# Patient Record
Sex: MALE | Race: WHITE | URBAN - METROPOLITAN AREA
[De-identification: names, ages, dates, MRNs, and addresses within clinical notes are randomized per-mention and may not be internally consistent; named-entity substitution may affect disease eponyms.]

---

## 2020-12-16 ENCOUNTER — OUTPATIENT (OUTPATIENT)
Dept: OUTPATIENT SERVICES | Facility: HOSPITAL | Age: 45
LOS: 1 days | End: 2020-12-16
Payer: COMMERCIAL

## 2020-12-16 DIAGNOSIS — Z20.828 CONTACT WITH AND (SUSPECTED) EXPOSURE TO OTHER VIRAL COMMUNICABLE DISEASES: ICD-10-CM

## 2020-12-16 LAB — SARS-COV-2 RNA SPEC QL NAA+PROBE: SIGNIFICANT CHANGE UP

## 2020-12-16 PROCEDURE — U0003: CPT

## 2020-12-17 DIAGNOSIS — Z20.828 CONTACT WITH AND (SUSPECTED) EXPOSURE TO OTHER VIRAL COMMUNICABLE DISEASES: ICD-10-CM

## 2021-01-02 ENCOUNTER — OUTPATIENT (OUTPATIENT)
Dept: OUTPATIENT SERVICES | Facility: HOSPITAL | Age: 46
LOS: 1 days | End: 2021-01-02
Payer: COMMERCIAL

## 2021-01-02 DIAGNOSIS — Z20.828 CONTACT WITH AND (SUSPECTED) EXPOSURE TO OTHER VIRAL COMMUNICABLE DISEASES: ICD-10-CM

## 2021-01-02 PROCEDURE — C9803: CPT

## 2021-01-02 PROCEDURE — U0003: CPT

## 2021-01-02 PROCEDURE — U0005: CPT

## 2021-01-03 DIAGNOSIS — Z20.828 CONTACT WITH AND (SUSPECTED) EXPOSURE TO OTHER VIRAL COMMUNICABLE DISEASES: ICD-10-CM

## 2021-01-03 LAB — SARS-COV-2 RNA SPEC QL NAA+PROBE: SIGNIFICANT CHANGE UP

## 2021-01-11 ENCOUNTER — OUTPATIENT (OUTPATIENT)
Dept: OUTPATIENT SERVICES | Facility: HOSPITAL | Age: 46
LOS: 1 days | End: 2021-01-11
Payer: COMMERCIAL

## 2021-01-11 DIAGNOSIS — Z20.828 CONTACT WITH AND (SUSPECTED) EXPOSURE TO OTHER VIRAL COMMUNICABLE DISEASES: ICD-10-CM

## 2021-01-11 LAB — SARS-COV-2 RNA SPEC QL NAA+PROBE: SIGNIFICANT CHANGE UP

## 2021-01-11 PROCEDURE — U0005: CPT

## 2021-01-11 PROCEDURE — U0003: CPT

## 2021-01-11 PROCEDURE — C9803: CPT

## 2021-01-12 DIAGNOSIS — Z20.828 CONTACT WITH AND (SUSPECTED) EXPOSURE TO OTHER VIRAL COMMUNICABLE DISEASES: ICD-10-CM

## 2021-05-29 ENCOUNTER — TRANSCRIPTION ENCOUNTER (OUTPATIENT)
Age: 46
End: 2021-05-29

## 2023-02-02 ENCOUNTER — OUTPATIENT (OUTPATIENT)
Dept: OUTPATIENT SERVICES | Facility: HOSPITAL | Age: 48
LOS: 1 days | Discharge: HOME | End: 2023-02-02
Payer: COMMERCIAL

## 2023-02-02 ENCOUNTER — TRANSCRIPTION ENCOUNTER (OUTPATIENT)
Age: 48
End: 2023-02-02

## 2023-02-02 DIAGNOSIS — C18.9 MALIGNANT NEOPLASM OF COLON, UNSPECIFIED: ICD-10-CM

## 2023-02-03 PROBLEM — Z00.00 ENCOUNTER FOR PREVENTIVE HEALTH EXAMINATION: Status: ACTIVE | Noted: 2023-02-03

## 2023-02-09 ENCOUNTER — NON-APPOINTMENT (OUTPATIENT)
Age: 48
End: 2023-02-09

## 2023-02-09 ENCOUNTER — APPOINTMENT (OUTPATIENT)
Dept: HEMATOLOGY ONCOLOGY | Facility: CLINIC | Age: 48
End: 2023-02-09
Payer: COMMERCIAL

## 2023-02-09 VITALS
SYSTOLIC BLOOD PRESSURE: 120 MMHG | DIASTOLIC BLOOD PRESSURE: 79 MMHG | OXYGEN SATURATION: 97 % | WEIGHT: 279.98 LBS | TEMPERATURE: 97.5 F | HEIGHT: 73 IN | HEART RATE: 65 BPM | BODY MASS INDEX: 37.11 KG/M2

## 2023-02-09 DIAGNOSIS — Z78.9 OTHER SPECIFIED HEALTH STATUS: ICD-10-CM

## 2023-02-09 DIAGNOSIS — I10 ESSENTIAL (PRIMARY) HYPERTENSION: ICD-10-CM

## 2023-02-09 PROCEDURE — 99205 OFFICE O/P NEW HI 60 MIN: CPT

## 2023-02-09 RX ORDER — LISINOPRIL 10 MG/1
10 TABLET ORAL DAILY
Refills: 0 | Status: ACTIVE | COMMUNITY
Start: 2023-02-09

## 2023-02-09 NOTE — HISTORY OF PRESENT ILLNESS
[de-identified] : Mr. Flores is a 47 year old gentlemen with no significant past medical history presenting to the office for an initial consultation for colon CA.\par This is 2nd opinion.\par \par He presented in 2019 with BRBPR.\par \par Colonoscopy on 01/28/2020 [facility]: Polyp (5cm) in the mid-descending colon\par Path: adenomatous colonic mucosa with at least high grade dysplasia.  \par \par CARIS 01/28/2020:\par ERIK negative\par BRAF negative\par ERBB2 by IHC negative\par \par MSI stable\par PD-L1 () by IHC 0\par APC\par TP53\par NTRK 1/2/3\par PIK3CA\par \par Patient received C1 FOLFOX + Erbitux on 03/04/2020\par C12 without Oxaliplatin on 09/02/2020\par \par 11/04/2020: Laparoscopic sigmoid resection, 5 areas in liver were ablated.\par \par 12/07/2020\par C1 Erbitux  500 mg/m2 every 2 weeks.\par Xeloda 1000 mg/m2 PO BID x 2 weeks on and one week off\par He stayed on this until early 2022.\par CEA began to elevate, and could not find source - had PET scan that was negative.\par \par MRI ordered by Dr Jennifer Pastrana and new liver lesions were found.\par \par 10/04/2022: FOLFIRI + Avastin\par Avastin 5 mg/kg\par 5-FU 2400 mg/m2\par 5-FU  mg/m2\par Irinotecan 180 mg/m2\par He is s/p 9 cycles, last treatment on 2/72023 to 2/9/2023\par CEA has decreased from 79 to 34 (2/7/23)\par Patient is currently being followed by Guardant response every 6 weeks; last 11/08/2022\par \par CT chest, abdomen/pelvis in a few weeks (2/20/23) [de-identified] : GI: Ken Pizarro\par Colon Surgeon: Ho Nunn ([facility])\par Dermatology: [facility]\par Allergist: Yoon Dowell\par [place] [facility]: Rohith Pastrana\par Medical Oncology: J Carlos Rivera [phone]\par \par Patient cell: [phone]\par \par

## 2023-02-14 ENCOUNTER — APPOINTMENT (OUTPATIENT)
Dept: HEMATOLOGY ONCOLOGY | Facility: CLINIC | Age: 48
End: 2023-02-14
Payer: COMMERCIAL

## 2023-02-15 ENCOUNTER — RESULT REVIEW (OUTPATIENT)
Age: 48
End: 2023-02-15

## 2023-02-15 PROCEDURE — 88321 CONSLTJ&REPRT SLD PREP ELSWR: CPT

## 2023-02-22 LAB — SURGICAL PATHOLOGY STUDY: SIGNIFICANT CHANGE UP

## 2023-03-03 ENCOUNTER — RESULT REVIEW (OUTPATIENT)
Age: 48
End: 2023-03-03

## 2023-03-03 PROCEDURE — 88321 CONSLTJ&REPRT SLD PREP ELSWR: CPT

## 2023-03-06 LAB — SURGICAL PATHOLOGY STUDY: SIGNIFICANT CHANGE UP

## 2023-03-17 ENCOUNTER — APPOINTMENT (OUTPATIENT)
Dept: HEMATOLOGY ONCOLOGY | Facility: CLINIC | Age: 48
End: 2023-03-17
Payer: COMMERCIAL

## 2023-03-22 ENCOUNTER — APPOINTMENT (OUTPATIENT)
Dept: HEMATOLOGY ONCOLOGY | Facility: CLINIC | Age: 48
End: 2023-03-22

## 2023-03-22 ENCOUNTER — APPOINTMENT (OUTPATIENT)
Dept: HEMATOLOGY ONCOLOGY | Facility: CLINIC | Age: 48
End: 2023-03-22
Payer: COMMERCIAL

## 2023-03-22 VITALS
BODY MASS INDEX: 37.2 KG/M2 | WEIGHT: 281.97 LBS | DIASTOLIC BLOOD PRESSURE: 87 MMHG | OXYGEN SATURATION: 99 % | RESPIRATION RATE: 16 BRPM | HEART RATE: 81 BPM | TEMPERATURE: 97.7 F | SYSTOLIC BLOOD PRESSURE: 117 MMHG

## 2023-03-22 PROCEDURE — 99213 OFFICE O/P EST LOW 20 MIN: CPT

## 2023-03-22 NOTE — HISTORY OF PRESENT ILLNESS
[de-identified] : Mr. Flores is a 47 year old gentlemen with no significant past medical history presenting to the office for an initial consultation for colon CA.\par This is 2nd opinion.\par \par He presented in 2019 with BRBPR.\par \par Colonoscopy on 01/28/2020 [facility]: Polyp (5cm) in the mid-descending colon\par Path: adenomatous colonic mucosa with at least high grade dysplasia.  \par \par CARIS 01/28/2020:\par ERIK negative\par BRAF negative\par ERBB2 by IHC negative\par \par MSI stable\par PD-L1 () by IHC 0\par APC\par TP53\par NTRK 1/2/3\par PIK3CA\par \par Patient received C1 FOLFOX + Erbitux on 03/04/2020\par C12 without Oxaliplatin on 09/02/2020\par \par 11/04/2020: Laparoscopic sigmoid resection, 5 areas in liver were ablated.\par \par 12/07/2020\par C1 Erbitux  500 mg/m2 every 2 weeks.\par Xeloda 1000 mg/m2 PO BID x 2 weeks on and one week off\par He stayed on this until early 2022.\par CEA began to elevate, and could not find source - had PET scan that was negative.\par \par MRI ordered by Dr Jennifer Pastrana and new liver lesions were found.\par \par 10/04/2022: FOLFIRI + Avastin\par Avastin 5 mg/kg\par 5-FU 2400 mg/m2\par 5-FU  mg/m2\par Irinotecan 180 mg/m2\par He is s/p 9 cycles, last treatment on 2/7/2023\par CEA has decreased from 79 to 34 (2/7/23)\par Patient is currently being followed by Guardant response every 6 weeks; last 11/08/2022\par \par CT chest, abdomen/pelvis done 2/20/23\par CEA decreased to ~23.6\par He is tolerating the chemotherapy pretty well; however for 2 days after treatment has fatigue\par No diarrhea, no vomiting or nausea.\par He does note headaches, and occ nose bleeding.\par Will have cycle #12 on 3/28/23 [de-identified] : GI: Ken Pizarro\par Colon Surgeon: Ho Nunn ([facility])\par Dermatology: [facility]\par Allergist: Yoon Dowell\par [place] [facility]: Rohith Pastrana\par Medical Oncology: J Carlos Rivera [phone]\par \par Patient cell: [phone]\par \par

## 2023-05-31 ENCOUNTER — OUTPATIENT (OUTPATIENT)
Dept: OUTPATIENT SERVICES | Facility: HOSPITAL | Age: 48
LOS: 1 days | Discharge: HOME | End: 2023-05-31

## 2023-05-31 DIAGNOSIS — C18.9 MALIGNANT NEOPLASM OF COLON, UNSPECIFIED: ICD-10-CM

## 2023-06-13 ENCOUNTER — APPOINTMENT (OUTPATIENT)
Dept: HEMATOLOGY ONCOLOGY | Facility: CLINIC | Age: 48
End: 2023-06-13
Payer: COMMERCIAL

## 2023-06-13 ENCOUNTER — APPOINTMENT (OUTPATIENT)
Dept: HEMATOLOGY ONCOLOGY | Facility: CLINIC | Age: 48
End: 2023-06-13

## 2023-07-14 ENCOUNTER — APPOINTMENT (OUTPATIENT)
Dept: HEMATOLOGY ONCOLOGY | Facility: CLINIC | Age: 48
End: 2023-07-14
Payer: COMMERCIAL

## 2023-07-14 VITALS
DIASTOLIC BLOOD PRESSURE: 89 MMHG | WEIGHT: 279.33 LBS | SYSTOLIC BLOOD PRESSURE: 131 MMHG | OXYGEN SATURATION: 98 % | HEART RATE: 116 BPM | BODY MASS INDEX: 36.85 KG/M2 | TEMPERATURE: 97.4 F | RESPIRATION RATE: 16 BRPM

## 2023-07-14 DIAGNOSIS — C78.7 MALIGNANT NEOPLASM OF COLON, UNSPECIFIED: ICD-10-CM

## 2023-07-14 DIAGNOSIS — C18.9 MALIGNANT NEOPLASM OF COLON, UNSPECIFIED: ICD-10-CM

## 2023-07-14 PROCEDURE — 99214 OFFICE O/P EST MOD 30 MIN: CPT

## 2023-07-14 NOTE — HISTORY OF PRESENT ILLNESS
[de-identified] : Mr. Flores is a 47 year old gentlemen with no significant past medical history presenting to the office for an initial consultation for colon CA.\par This is 2nd opinion.\par \par He presented in 2019 with BRBPR.\par \par Colonoscopy on 01/28/2020 [facility]: Polyp (5cm) in the mid-descending colon\par Path: adenomatous colonic mucosa with at least high grade dysplasia.  \par \par CARIS 01/28/2020:\par ERIK negative\par BRAF negative\par ERBB2 by IHC negative\par \par MSI stable\par PD-L1 () by IHC 0\par APC\par TP53\par NTRK 1/2/3\par PIK3CA\par \par Patient received C1 FOLFOX + Erbitux on 03/04/2020\par C12 without Oxaliplatin on 09/02/2020\par \par 11/04/2020: Laparoscopic sigmoid resection, 5 areas in liver were ablated.\par \par 12/07/2020\par C1 Erbitux  500 mg/m2 every 2 weeks.\par Xeloda 1000 mg/m2 PO BID x 2 weeks on and one week off\par He stayed on this until early 2022.\par CEA began to elevate, and could not find source - had PET scan that was negative.\par \par MRI ordered by Dr Jennifer Pastrana and new liver lesions were found.\par \par 10/04/2022: FOLFIRI + Avastin\par Avastin 5 mg/kg\par 5-FU 2400 mg/m2\par 5-FU  mg/m2\par Irinotecan 180 mg/m2\par He is s/p 9 cycles, last treatment on 2/7/2023\par CEA has decreased from 79 to 34 (2/7/23)\par Patient is currently being followed by Guardant response every 6 weeks; last 11/08/2022\par \par CT chest, abdomen/pelvis done 2/20/23\par CEA decreased to ~23.6\par He is tolerating the chemotherapy pretty well; however for 2 days after treatment has fatigue\par No diarrhea, no vomiting or nausea.\par He does note headaches, and occ nose bleeding.\par Will have cycle #12 on 3/28/23\par \par 07/14/2023\par Patient last treatment of C#19 FOLFIRI + Avastin was on 07/11/2023.\par Doing relatively well on treatment.  Denies diarrhea.\par BP is controlled on Lisinopril 10 mg daily.\par His last CT chest, abdomen/pelvis was ~ 2 months ago showed mild progression.\par Also noted to have mild rise in CEA 34.1 on 07/11/2023.\par He is scheduled for PET/CT on 07/21/2023.\par As per patient the plan was for him to change therapy to FOLFOX retreat however waiting for PET/CT results.\par He underwent Guardant response last week.  Results pending. [de-identified] : GI: Ken Pizarro\par Colon Surgeon: Ho Nunn ([facility])\par Dermatology: [facility]\par Allergist: Yoon Dowell\par [place] [facility]: Rohith Pastrana\par Medical Oncology: J Carlos Rivera [phone]\par \par Patient cell: [phone]\par \par

## 2024-06-26 RX ORDER — AZITHROMYCIN 250 MG/1
0 TABLET, FILM COATED ORAL
Refills: 0 | DISCHARGE
Start: 2024-06-26 | End: 2024-06-30

## 2024-07-11 ENCOUNTER — INPATIENT (INPATIENT)
Facility: HOSPITAL | Age: 49
LOS: 6 days | Discharge: HOME HEALTH | DRG: 251 | End: 2024-07-18
Attending: FAMILY MEDICINE | Admitting: FAMILY MEDICINE
Payer: COMMERCIAL

## 2024-07-11 VITALS — WEIGHT: 249.78 LBS

## 2024-07-11 DIAGNOSIS — Z98.890 OTHER SPECIFIED POSTPROCEDURAL STATES: Chronic | ICD-10-CM

## 2024-07-11 DIAGNOSIS — R74.01 ELEVATION OF LEVELS OF LIVER TRANSAMINASE LEVELS: ICD-10-CM

## 2024-07-11 DIAGNOSIS — R10.84 GENERALIZED ABDOMINAL PAIN: ICD-10-CM

## 2024-07-11 DIAGNOSIS — Z90.49 ACQUIRED ABSENCE OF OTHER SPECIFIED PARTS OF DIGESTIVE TRACT: Chronic | ICD-10-CM

## 2024-07-11 LAB
ALBUMIN SERPL ELPH-MCNC: 2.6 G/DL — LOW (ref 3.3–5)
ALBUMIN SERPL ELPH-MCNC: 2.9 G/DL — LOW (ref 3.3–5)
ALP SERPL-CCNC: 249 U/L — HIGH (ref 40–120)
ALP SERPL-CCNC: 293 U/L — HIGH (ref 40–120)
ALT FLD-CCNC: 67 U/L — SIGNIFICANT CHANGE UP (ref 12–78)
ALT FLD-CCNC: 74 U/L — SIGNIFICANT CHANGE UP (ref 12–78)
AMMONIA BLD-MCNC: 19 UMOL/L — SIGNIFICANT CHANGE UP (ref 11–32)
ANION GAP SERPL CALC-SCNC: 5 MMOL/L — SIGNIFICANT CHANGE UP (ref 5–17)
APPEARANCE UR: CLEAR — SIGNIFICANT CHANGE UP
APTT BLD: 29.5 SEC — SIGNIFICANT CHANGE UP (ref 24.5–35.6)
AST SERPL-CCNC: 134 U/L — HIGH (ref 15–37)
AST SERPL-CCNC: 150 U/L — HIGH (ref 15–37)
BASE EXCESS BLDV CALC-SCNC: -1.2 MMOL/L — SIGNIFICANT CHANGE UP (ref -2–3)
BASOPHILS # BLD AUTO: 0.02 K/UL — SIGNIFICANT CHANGE UP (ref 0–0.2)
BASOPHILS NFR BLD AUTO: 0.2 % — SIGNIFICANT CHANGE UP (ref 0–2)
BILIRUB DIRECT SERPL-MCNC: 6.5 MG/DL — HIGH (ref 0–0.3)
BILIRUB DIRECT SERPL-MCNC: 6.7 MG/DL — HIGH (ref 0–0.3)
BILIRUB INDIRECT FLD-MCNC: 1.5 MG/DL — HIGH (ref 0.2–1)
BILIRUB INDIRECT FLD-MCNC: 1.5 MG/DL — HIGH (ref 0.2–1)
BILIRUB SERPL-MCNC: 8 MG/DL — HIGH (ref 0.2–1.2)
BILIRUB SERPL-MCNC: 8.2 MG/DL — HIGH (ref 0.2–1.2)
BILIRUB SERPL-MCNC: 8.2 MG/DL — HIGH (ref 0.2–1.2)
BILIRUB UR-MCNC: ABNORMAL
BUN SERPL-MCNC: 9 MG/DL — SIGNIFICANT CHANGE UP (ref 7–23)
CALCIUM SERPL-MCNC: 8.8 MG/DL — SIGNIFICANT CHANGE UP (ref 8.5–10.1)
CHLORIDE SERPL-SCNC: 106 MMOL/L — SIGNIFICANT CHANGE UP (ref 96–108)
CO2 SERPL-SCNC: 25 MMOL/L — SIGNIFICANT CHANGE UP (ref 22–31)
COLOR SPEC: SIGNIFICANT CHANGE UP
CREAT SERPL-MCNC: 0.68 MG/DL — SIGNIFICANT CHANGE UP (ref 0.5–1.3)
DIFF PNL FLD: NEGATIVE — SIGNIFICANT CHANGE UP
EGFR: 113 ML/MIN/1.73M2 — SIGNIFICANT CHANGE UP
EOSINOPHIL # BLD AUTO: 0.12 K/UL — SIGNIFICANT CHANGE UP (ref 0–0.5)
EOSINOPHIL NFR BLD AUTO: 1.5 % — SIGNIFICANT CHANGE UP (ref 0–6)
FLUAV AG NPH QL: SIGNIFICANT CHANGE UP
FLUBV AG NPH QL: SIGNIFICANT CHANGE UP
GAS PNL BLDV: SIGNIFICANT CHANGE UP
GLUCOSE SERPL-MCNC: 116 MG/DL — HIGH (ref 70–99)
GLUCOSE UR QL: NEGATIVE MG/DL — SIGNIFICANT CHANGE UP
HCO3 BLDV-SCNC: 24 MMOL/L — SIGNIFICANT CHANGE UP (ref 22–29)
HCT VFR BLD CALC: 40.9 % — SIGNIFICANT CHANGE UP (ref 39–50)
HGB BLD-MCNC: 13.9 G/DL — SIGNIFICANT CHANGE UP (ref 13–17)
IMM GRANULOCYTES NFR BLD AUTO: 0.2 % — SIGNIFICANT CHANGE UP (ref 0–0.9)
INR BLD: 1.22 RATIO — HIGH (ref 0.85–1.18)
KETONES UR-MCNC: ABNORMAL MG/DL
LACTATE SERPL-SCNC: 1.3 MMOL/L — SIGNIFICANT CHANGE UP (ref 0.7–2)
LEUKOCYTE ESTERASE UR-ACNC: NEGATIVE — SIGNIFICANT CHANGE UP
LYMPHOCYTES # BLD AUTO: 1.64 K/UL — SIGNIFICANT CHANGE UP (ref 1–3.3)
LYMPHOCYTES # BLD AUTO: 20.4 % — SIGNIFICANT CHANGE UP (ref 13–44)
MCHC RBC-ENTMCNC: 32.5 PG — SIGNIFICANT CHANGE UP (ref 27–34)
MCHC RBC-ENTMCNC: 34 GM/DL — SIGNIFICANT CHANGE UP (ref 32–36)
MCV RBC AUTO: 95.6 FL — SIGNIFICANT CHANGE UP (ref 80–100)
MONOCYTES # BLD AUTO: 0.84 K/UL — SIGNIFICANT CHANGE UP (ref 0–0.9)
MONOCYTES NFR BLD AUTO: 10.5 % — SIGNIFICANT CHANGE UP (ref 2–14)
NEUTROPHILS # BLD AUTO: 5.38 K/UL — SIGNIFICANT CHANGE UP (ref 1.8–7.4)
NEUTROPHILS NFR BLD AUTO: 67.2 % — SIGNIFICANT CHANGE UP (ref 43–77)
NITRITE UR-MCNC: NEGATIVE — SIGNIFICANT CHANGE UP
PCO2 BLDV: 39 MMHG — LOW (ref 42–55)
PH BLDV: 7.39 — SIGNIFICANT CHANGE UP (ref 7.32–7.43)
PH UR: 6 — SIGNIFICANT CHANGE UP (ref 5–8)
PLATELET # BLD AUTO: 222 K/UL — SIGNIFICANT CHANGE UP (ref 150–400)
PO2 BLDV: 48 MMHG — HIGH (ref 25–45)
POTASSIUM SERPL-MCNC: 3.8 MMOL/L — SIGNIFICANT CHANGE UP (ref 3.5–5.3)
POTASSIUM SERPL-SCNC: 3.8 MMOL/L — SIGNIFICANT CHANGE UP (ref 3.5–5.3)
PROT SERPL-MCNC: 6.5 GM/DL — SIGNIFICANT CHANGE UP (ref 6–8.3)
PROT SERPL-MCNC: 7.6 GM/DL — SIGNIFICANT CHANGE UP (ref 6–8.3)
PROT UR-MCNC: NEGATIVE MG/DL — SIGNIFICANT CHANGE UP
PROTHROM AB SERPL-ACNC: 13.7 SEC — HIGH (ref 9.5–13)
RBC # BLD: 4.28 M/UL — SIGNIFICANT CHANGE UP (ref 4.2–5.8)
RBC # FLD: 12 % — SIGNIFICANT CHANGE UP (ref 10.3–14.5)
RSV RNA NPH QL NAA+NON-PROBE: SIGNIFICANT CHANGE UP
SAO2 % BLDV: 83 % — SIGNIFICANT CHANGE UP (ref 67–88)
SARS-COV-2 RNA SPEC QL NAA+PROBE: SIGNIFICANT CHANGE UP
SODIUM SERPL-SCNC: 136 MMOL/L — SIGNIFICANT CHANGE UP (ref 135–145)
SP GR SPEC: 1.02 — SIGNIFICANT CHANGE UP (ref 1–1.03)
UROBILINOGEN FLD QL: 1 MG/DL — SIGNIFICANT CHANGE UP (ref 0.2–1)
WBC # BLD: 8.02 K/UL — SIGNIFICANT CHANGE UP (ref 3.8–10.5)
WBC # FLD AUTO: 8.02 K/UL — SIGNIFICANT CHANGE UP (ref 3.8–10.5)

## 2024-07-11 PROCEDURE — 76000 FLUOROSCOPY <1 HR PHYS/QHP: CPT

## 2024-07-11 PROCEDURE — 84100 ASSAY OF PHOSPHORUS: CPT

## 2024-07-11 PROCEDURE — 80048 BASIC METABOLIC PNL TOTAL CA: CPT

## 2024-07-11 PROCEDURE — 82150 ASSAY OF AMYLASE: CPT

## 2024-07-11 PROCEDURE — C1894: CPT

## 2024-07-11 PROCEDURE — 36415 COLL VENOUS BLD VENIPUNCTURE: CPT

## 2024-07-11 PROCEDURE — 87521 HEPATITIS C PROBE&RVRS TRNSC: CPT

## 2024-07-11 PROCEDURE — 87015 SPECIMEN INFECT AGNT CONCNTJ: CPT

## 2024-07-11 PROCEDURE — C1889: CPT

## 2024-07-11 PROCEDURE — 99223 1ST HOSP IP/OBS HIGH 75: CPT

## 2024-07-11 PROCEDURE — 84443 ASSAY THYROID STIM HORMONE: CPT

## 2024-07-11 PROCEDURE — 80061 LIPID PANEL: CPT

## 2024-07-11 PROCEDURE — 76705 ECHO EXAM OF ABDOMEN: CPT | Mod: 26

## 2024-07-11 PROCEDURE — 85730 THROMBOPLASTIN TIME PARTIAL: CPT

## 2024-07-11 PROCEDURE — 74183 MRI ABD W/O CNTR FLWD CNTR: CPT | Mod: MC

## 2024-07-11 PROCEDURE — 86140 C-REACTIVE PROTEIN: CPT

## 2024-07-11 PROCEDURE — 80076 HEPATIC FUNCTION PANEL: CPT

## 2024-07-11 PROCEDURE — A9579: CPT

## 2024-07-11 PROCEDURE — 85025 COMPLETE CBC W/AUTO DIFF WBC: CPT

## 2024-07-11 PROCEDURE — 47533 PLMT BILIARY DRAINAGE CATH: CPT

## 2024-07-11 PROCEDURE — 87206 SMEAR FLUORESCENT/ACID STAI: CPT

## 2024-07-11 PROCEDURE — 83036 HEMOGLOBIN GLYCOSYLATED A1C: CPT

## 2024-07-11 PROCEDURE — C1729: CPT

## 2024-07-11 PROCEDURE — 71045 X-RAY EXAM CHEST 1 VIEW: CPT | Mod: 26

## 2024-07-11 PROCEDURE — 93010 ELECTROCARDIOGRAM REPORT: CPT

## 2024-07-11 PROCEDURE — 87116 MYCOBACTERIA CULTURE: CPT

## 2024-07-11 PROCEDURE — 82306 VITAMIN D 25 HYDROXY: CPT

## 2024-07-11 PROCEDURE — 80053 COMPREHEN METABOLIC PANEL: CPT

## 2024-07-11 PROCEDURE — 84145 PROCALCITONIN (PCT): CPT

## 2024-07-11 PROCEDURE — C2617: CPT

## 2024-07-11 PROCEDURE — C1769: CPT

## 2024-07-11 PROCEDURE — 85610 PROTHROMBIN TIME: CPT

## 2024-07-11 PROCEDURE — 82248 BILIRUBIN DIRECT: CPT

## 2024-07-11 PROCEDURE — 83690 ASSAY OF LIPASE: CPT

## 2024-07-11 PROCEDURE — 85027 COMPLETE CBC AUTOMATED: CPT

## 2024-07-11 PROCEDURE — 82140 ASSAY OF AMMONIA: CPT

## 2024-07-11 PROCEDURE — 80074 ACUTE HEPATITIS PANEL: CPT

## 2024-07-11 PROCEDURE — 83735 ASSAY OF MAGNESIUM: CPT

## 2024-07-11 PROCEDURE — 99285 EMERGENCY DEPT VISIT HI MDM: CPT

## 2024-07-11 RX ORDER — ONDANSETRON HYDROCHLORIDE 2 MG/ML
4 INJECTION INTRAMUSCULAR; INTRAVENOUS EVERY 8 HOURS
Refills: 0 | Status: DISCONTINUED | OUTPATIENT
Start: 2024-07-11 | End: 2024-07-18

## 2024-07-11 RX ORDER — ACETAMINOPHEN 325 MG
650 TABLET ORAL EVERY 6 HOURS
Refills: 0 | Status: DISCONTINUED | OUTPATIENT
Start: 2024-07-11 | End: 2024-07-18

## 2024-07-11 RX ORDER — SODIUM CHLORIDE 0.9 % (FLUSH) 0.9 %
2500 SYRINGE (ML) INJECTION ONCE
Refills: 0 | Status: COMPLETED | OUTPATIENT
Start: 2024-07-11 | End: 2024-07-11

## 2024-07-11 RX ORDER — MAGNESIUM, ALUMINUM HYDROXIDE 400-400
30 TABLET,CHEWABLE ORAL EVERY 4 HOURS
Refills: 0 | Status: DISCONTINUED | OUTPATIENT
Start: 2024-07-11 | End: 2024-07-18

## 2024-07-11 RX ORDER — PIPERACILLIN SODIUM AND TAZOBACTAM SODIUM 3; .375 G/15ML; G/15ML
3.38 INJECTION, POWDER, LYOPHILIZED, FOR SOLUTION INTRAVENOUS ONCE
Refills: 0 | Status: COMPLETED | OUTPATIENT
Start: 2024-07-11 | End: 2024-07-11

## 2024-07-11 RX ORDER — DEXTROSE MONOHYDRATE AND SODIUM CHLORIDE 5; .3 G/100ML; G/100ML
1000 INJECTION, SOLUTION INTRAVENOUS
Refills: 0 | Status: DISCONTINUED | OUTPATIENT
Start: 2024-07-11 | End: 2024-07-15

## 2024-07-11 RX ADMIN — Medication 2500 MILLILITER(S): at 16:35

## 2024-07-11 RX ADMIN — DEXTROSE MONOHYDRATE AND SODIUM CHLORIDE 125 MILLILITER(S): 5; .3 INJECTION, SOLUTION INTRAVENOUS at 20:01

## 2024-07-11 RX ADMIN — PIPERACILLIN SODIUM AND TAZOBACTAM SODIUM 200 GRAM(S): 3; .375 INJECTION, POWDER, LYOPHILIZED, FOR SOLUTION INTRAVENOUS at 17:01

## 2024-07-11 NOTE — H&P ADULT - NSHPLABSRESULTS_GEN_ALL_CORE
LABS:  cret                        13.9   8.02  )-----------( 222      ( 11 Jul 2024 15:29 )             40.9     07-11    136  |  106  |  9   ----------------------------<  116<H>  3.8   |  25  |  0.68    Ca    8.8      11 Jul 2024 15:29    TPro  7.6  /  Alb  2.9<L>  /  TBili  8.2<H>  /  DBili  6.7<H>  /  AST  150<H>  /  ALT  74  /  AlkPhos  293<H>  07-11    PT/INR - ( 11 Jul 2024 15:29 )   PT: 13.7 sec;   INR: 1.22 ratio         PTT - ( 11 Jul 2024 15:29 )  PTT:29.5 sec      Urinalysis with Rflx Culture (collected 07-11-24 @ 17:00)

## 2024-07-11 NOTE — ED ADULT TRIAGE NOTE - CHIEF COMPLAINT QUOTE
Pt sent by [facility] for multiple medical complaints. Pt had elevated bilirubin (8.1) on outpatient labs, fever and RUQ pain x2 weeks, and yellow scerla starting yesterday. HX of colon CA with mets to the liver. Last chemo yesterday.

## 2024-07-11 NOTE — H&P ADULT - ASSESSMENT
49-year-old male PMH: CA with liver metastases on panitumumab (last dose 7/10), HTN, presents emergency department for right upper quadrant pain associated with fever Tmax 100.4 °F, chills, jaundice, dark urine. 49M admitted for obstructive jaundice likely 2/2 malignancy vs cholangitis.

## 2024-07-11 NOTE — ED ADULT NURSE NOTE - NSFALLUNIVINTERV_ED_ALL_ED
Bed/Stretcher in lowest position, wheels locked, appropriate side rails in place/Call bell, personal items and telephone in reach/Instruct patient to call for assistance before getting out of bed/chair/stretcher/Non-slip footwear applied when patient is off stretcher/[place] to call system/Physically safe environment - no spills, clutter or unnecessary equipment/Purposeful proactive rounding/Room/bathroom lighting operational, light cord in reach

## 2024-07-11 NOTE — H&P ADULT - NSICDXPASTSURGICALHX_GEN_ALL_CORE_FT
PAST SURGICAL HISTORY:  History of ablation of neoplasm of liver     History of biliary stent insertion     S/P colon resection     S/P hernia surgery

## 2024-07-11 NOTE — H&P ADULT - HISTORY OF PRESENT ILLNESS
49-year-old male PMH: CA with liver metastases on panitumumab (last dose 7/10), HTN, presents emergency department for right upper quadrant pain associated with fever Tmax 100.4 °F, chills, jaundice, dark urine.   49-year-old male PMH: CA with liver and lung metastases on panitumumab (last dose 7/10), HTN, presents emergency department for right upper quadrant pain associated with fever Tmax 100.4 °F, chills, jaundice, dark urine for the past 2 weeks. No other complaints, and ROS otherwise benign.   In ED vitals stable, CBC/BMP unremarkable, LFTs noted for T. bili 8.2, D. bili 6.7, Alk phos 249, AST//74, UA sterile with bilirubin and ketones. COVID/Flu negative. US abdomen noted for hepatomegaly with abnormal appearance of the liver echotexture and echogenicity. Multiple liver lesions are seen. There is also a large masslike lesion near the pancreatic body. Evidence of intrahepatic and extrahepatic biliary duct dilatation. These findings are worrisome for an underlying neoplasm. Patient admitted to  for further evaluation.

## 2024-07-11 NOTE — ED ADULT NURSE NOTE - OBJECTIVE STATEMENT
The patient is a 49y Male complaining of sent by MD. [place] [facility] [facility] showed high bilirubin. 20 g IV inserted labs drawn. Yellowing of sclera noted. Pt is A & Ox4, GCS 15.

## 2024-07-11 NOTE — H&P ADULT - PROBLEM SELECTOR PLAN 1
Patient with colon CA with mets to liver and lungs on immunotherapy. Follows at [facility].   Has had obs jaundice in the past s/p biliary stent.  Now with 2 weeks of RUQ pain, jaundice and fever.   Septic w/u obtained in ED, s/p 1 dose zosyn  C/w zosyn for possible cholangitis.   Trend LFTs q8h.   GI consult placed

## 2024-07-11 NOTE — ED PROVIDER NOTE - CLINICAL SUMMARY MEDICAL DECISION MAKING FREE TEXT BOX
49-year-old male presenting with right upper quadrant pain associated with fever and jaundice concerning for ascending cholangitis.  CT performed [facility] and given IV Zosyn.  Patient is hemodynamically stable on arrival, distended abdomen without any significant tenderness.  Scleral icterus present, no significant jaundice, no asterixis.  Will evaluate with blood work, right upper quadrant ultrasound, continue IV antibiotics, GI consult, admit.

## 2024-07-11 NOTE — ED PROVIDER NOTE - PHYSICAL EXAMINATION
GENERAL: A&Ox4, non-toxic appearing, no acute distress  HEENT: NCAT, EOMI, oral mucosa moist,  scleral icterus  RESP: CTAB, no respiratory distress, no wheezes/rhonchi/rales, speaking in full sentences  CV: RRR, no murmurs/rubs/gallops  ABDOMEN: soft, non-tender, distended, reducible umbilical hernia, no guarding, no rebound tenderness  MSK: no visible deformities  NEURO: no focal sensory or motor deficits, CN 2-12 grossly intact, no asterixis   SKIN: warm, normal color, well perfused, no rash  PSYCH: normal affect

## 2024-07-11 NOTE — ED PROVIDER NOTE - PROGRESS NOTE DETAILS
All labs and imaging personally reviewed.  Patient has a normal WBC count and lactate, bilirubin 8.2, direct bili 6.7, indirect bili 1.5.  Urinalysis with large bilirubin.  Right upper quadrant ultrasound demonstrates hepatomegaly with abnormal appearance of the liver echotexture and echogenicity with multiple liver lesions.  There is also a large masslike lesion near the pancreatic body.  These findings are worrisome for underlying neoplasm.  May have compressive versus obstructive pathology.  Recommend contrast-enhanced MRI/MRCP with liver/pancreas protocol.  Unit secretary to [facility].  I, Oracio Morel, personally discussed this patient's care with Dr. Thaddeus castellano who recommends admit to [facility]. Spoke with Dr. Basilio from GI, agrees with MRCP to further evaluate.  Orders placed for MRCP and hospitalist Dr. Stauffer aware.

## 2024-07-11 NOTE — PATIENT PROFILE ADULT - STATED REASON FOR ADMISSION
I went [place] [facility] and said bili was high and was advised to come to the ED

## 2024-07-11 NOTE — H&P ADULT - NSHPPHYSICALEXAM_GEN_ALL_CORE
T(C): 37.8 (07-11-24 @ 19:35), Max: 37.8 (07-11-24 @ 19:35)  HR: 98 (07-11-24 @ 19:35) (88 - 116)  BP: 117/80 (07-11-24 @ 19:35) (117/80 - 135/94)  RR: 16 (07-11-24 @ 19:35) (16 - 18)  SpO2: 97% (07-11-24 @ 19:35) (97% - 99%)    General: non-toxic  HEENT: non-traumatic, perrla, eomi  Cardio: s1s2 regular rate and rhythm  Lungs: comfortable breathing, clear to auscultation  Abdomen: Soft, non-tender, non-distended  Neuro: AOx4  Ext: Pulses +2

## 2024-07-11 NOTE — ED PROVIDER NOTE - CARE PLAN
Principal Discharge DX:	Acute generalized abdominal pain with fever  Secondary Diagnosis:	Elevated bilirubin   1

## 2024-07-11 NOTE — ED PROVIDER NOTE - OBJECTIVE STATEMENT
49-year-old male PMH: CA with liver metastases on panitumumab (last dose 7/10), HTN, presents emergency department for right upper quadrant pain associated with fever Tmax 100.4 °F, chills, jaundice, dark urine.  Patient was found to have elevated bilirubin 8.1 on 7/10.  Reports intermittently taking ibuprofen for fevers.  Was seen at [facility] earlier today that demonstrates new trace right intrahepatic biliary ductal dilation and mild gallbladder distention.  Sent to ED after receiving 1 L NS bolus and Zosyn to rule out cholangitis.

## 2024-07-12 DIAGNOSIS — C18.9 MALIGNANT NEOPLASM OF COLON, UNSPECIFIED: ICD-10-CM

## 2024-07-12 DIAGNOSIS — K83.1 OBSTRUCTION OF BILE DUCT: ICD-10-CM

## 2024-07-12 LAB
A1C WITH ESTIMATED AVERAGE GLUCOSE RESULT: 5 % — SIGNIFICANT CHANGE UP (ref 4–5.6)
ALBUMIN SERPL ELPH-MCNC: 2.5 G/DL — LOW (ref 3.3–5)
ALBUMIN SERPL ELPH-MCNC: 2.5 G/DL — LOW (ref 3.3–5)
ALBUMIN SERPL ELPH-MCNC: 2.6 G/DL — LOW (ref 3.3–5)
ALP SERPL-CCNC: 255 U/L — HIGH (ref 40–120)
ALP SERPL-CCNC: 255 U/L — HIGH (ref 40–120)
ALP SERPL-CCNC: 275 U/L — HIGH (ref 40–120)
ALT FLD-CCNC: 59 U/L — SIGNIFICANT CHANGE UP (ref 12–78)
ALT FLD-CCNC: 61 U/L — SIGNIFICANT CHANGE UP (ref 12–78)
ALT FLD-CCNC: 62 U/L — SIGNIFICANT CHANGE UP (ref 12–78)
ANION GAP SERPL CALC-SCNC: 6 MMOL/L — SIGNIFICANT CHANGE UP (ref 5–17)
AST SERPL-CCNC: 123 U/L — HIGH (ref 15–37)
AST SERPL-CCNC: 128 U/L — HIGH (ref 15–37)
AST SERPL-CCNC: 129 U/L — HIGH (ref 15–37)
BASOPHILS # BLD AUTO: 0.02 K/UL — SIGNIFICANT CHANGE UP (ref 0–0.2)
BASOPHILS NFR BLD AUTO: 0.3 % — SIGNIFICANT CHANGE UP (ref 0–2)
BILIRUB DIRECT SERPL-MCNC: 7.3 MG/DL — HIGH (ref 0–0.3)
BILIRUB DIRECT SERPL-MCNC: 7.6 MG/DL — HIGH (ref 0–0.3)
BILIRUB DIRECT SERPL-MCNC: 8.8 MG/DL — HIGH (ref 0–0.3)
BILIRUB INDIRECT FLD-MCNC: 1.8 MG/DL — HIGH (ref 0.2–1)
BILIRUB INDIRECT FLD-MCNC: 1.8 MG/DL — HIGH (ref 0.2–1)
BILIRUB INDIRECT FLD-MCNC: 2.2 MG/DL — HIGH (ref 0.2–1)
BILIRUB SERPL-MCNC: 11 MG/DL — HIGH (ref 0.2–1.2)
BILIRUB SERPL-MCNC: 9.1 MG/DL — HIGH (ref 0.2–1.2)
BILIRUB SERPL-MCNC: 9.4 MG/DL — HIGH (ref 0.2–1.2)
BUN SERPL-MCNC: 6 MG/DL — LOW (ref 7–23)
CALCIUM SERPL-MCNC: 8.5 MG/DL — SIGNIFICANT CHANGE UP (ref 8.5–10.1)
CHLORIDE SERPL-SCNC: 107 MMOL/L — SIGNIFICANT CHANGE UP (ref 96–108)
CHOLEST SERPL-MCNC: 188 MG/DL — SIGNIFICANT CHANGE UP
CO2 SERPL-SCNC: 24 MMOL/L — SIGNIFICANT CHANGE UP (ref 22–31)
CREAT SERPL-MCNC: 0.61 MG/DL — SIGNIFICANT CHANGE UP (ref 0.5–1.3)
EGFR: 117 ML/MIN/1.73M2 — SIGNIFICANT CHANGE UP
EOSINOPHIL # BLD AUTO: 0.21 K/UL — SIGNIFICANT CHANGE UP (ref 0–0.5)
EOSINOPHIL NFR BLD AUTO: 3.1 % — SIGNIFICANT CHANGE UP (ref 0–6)
ESTIMATED AVERAGE GLUCOSE: 97 MG/DL — SIGNIFICANT CHANGE UP (ref 68–114)
GLUCOSE SERPL-MCNC: 95 MG/DL — SIGNIFICANT CHANGE UP (ref 70–99)
HCT VFR BLD CALC: 37.1 % — LOW (ref 39–50)
HDLC SERPL-MCNC: 15 MG/DL — LOW
HGB BLD-MCNC: 12.8 G/DL — LOW (ref 13–17)
IMM GRANULOCYTES NFR BLD AUTO: 0.3 % — SIGNIFICANT CHANGE UP (ref 0–0.9)
LIPID PNL WITH DIRECT LDL SERPL: 147 MG/DL — HIGH
LYMPHOCYTES # BLD AUTO: 1.67 K/UL — SIGNIFICANT CHANGE UP (ref 1–3.3)
LYMPHOCYTES # BLD AUTO: 24.4 % — SIGNIFICANT CHANGE UP (ref 13–44)
MCHC RBC-ENTMCNC: 33.2 PG — SIGNIFICANT CHANGE UP (ref 27–34)
MCHC RBC-ENTMCNC: 34.5 GM/DL — SIGNIFICANT CHANGE UP (ref 32–36)
MCV RBC AUTO: 96.1 FL — SIGNIFICANT CHANGE UP (ref 80–100)
MONOCYTES # BLD AUTO: 0.8 K/UL — SIGNIFICANT CHANGE UP (ref 0–0.9)
MONOCYTES NFR BLD AUTO: 11.7 % — SIGNIFICANT CHANGE UP (ref 2–14)
NEUTROPHILS # BLD AUTO: 4.13 K/UL — SIGNIFICANT CHANGE UP (ref 1.8–7.4)
NEUTROPHILS NFR BLD AUTO: 60.2 % — SIGNIFICANT CHANGE UP (ref 43–77)
NON HDL CHOLESTEROL: 173 MG/DL — HIGH
PLATELET # BLD AUTO: 188 K/UL — SIGNIFICANT CHANGE UP (ref 150–400)
POTASSIUM SERPL-MCNC: 3.4 MMOL/L — LOW (ref 3.5–5.3)
POTASSIUM SERPL-SCNC: 3.4 MMOL/L — LOW (ref 3.5–5.3)
PROT SERPL-MCNC: 6.6 GM/DL — SIGNIFICANT CHANGE UP (ref 6–8.3)
PROT SERPL-MCNC: 6.7 GM/DL — SIGNIFICANT CHANGE UP (ref 6–8.3)
PROT SERPL-MCNC: 7.1 GM/DL — SIGNIFICANT CHANGE UP (ref 6–8.3)
RBC # BLD: 3.86 M/UL — LOW (ref 4.2–5.8)
RBC # FLD: 12.3 % — SIGNIFICANT CHANGE UP (ref 10.3–14.5)
SODIUM SERPL-SCNC: 137 MMOL/L — SIGNIFICANT CHANGE UP (ref 135–145)
TRIGL SERPL-MCNC: 141 MG/DL — SIGNIFICANT CHANGE UP
WBC # BLD: 6.85 K/UL — SIGNIFICANT CHANGE UP (ref 3.8–10.5)
WBC # FLD AUTO: 6.85 K/UL — SIGNIFICANT CHANGE UP (ref 3.8–10.5)

## 2024-07-12 PROCEDURE — 74183 MRI ABD W/O CNTR FLWD CNTR: CPT | Mod: 26

## 2024-07-12 PROCEDURE — 99232 SBSQ HOSP IP/OBS MODERATE 35: CPT

## 2024-07-12 RX ORDER — POTASSIUM CHLORIDE 600 MG/1
40 TABLET, FILM COATED, EXTENDED RELEASE ORAL ONCE
Refills: 0 | Status: COMPLETED | OUTPATIENT
Start: 2024-07-12 | End: 2024-07-12

## 2024-07-12 RX ORDER — ENOXAPARIN SODIUM 100 MG/ML
40 INJECTION SUBCUTANEOUS EVERY 24 HOURS
Refills: 0 | Status: DISCONTINUED | OUTPATIENT
Start: 2024-07-12 | End: 2024-07-15

## 2024-07-12 RX ORDER — TRAZODONE HYDROCHLORIDE 50 MG/1
50 TABLET, FILM COATED ORAL ONCE
Refills: 0 | Status: COMPLETED | OUTPATIENT
Start: 2024-07-12 | End: 2024-07-12

## 2024-07-12 RX ORDER — PIPERACILLIN SODIUM AND TAZOBACTAM SODIUM 3; .375 G/15ML; G/15ML
3.38 INJECTION, POWDER, LYOPHILIZED, FOR SOLUTION INTRAVENOUS EVERY 8 HOURS
Refills: 0 | Status: DISCONTINUED | OUTPATIENT
Start: 2024-07-12 | End: 2024-07-18

## 2024-07-12 RX ORDER — SODIUM CHLORIDE 0.9 % (FLUSH) 0.9 %
1000 SYRINGE (ML) INJECTION
Refills: 0 | Status: DISCONTINUED | OUTPATIENT
Start: 2024-07-12 | End: 2024-07-16

## 2024-07-12 RX ADMIN — Medication 3 MILLIGRAM(S): at 23:05

## 2024-07-12 RX ADMIN — ENOXAPARIN SODIUM 40 MILLIGRAM(S): 100 INJECTION SUBCUTANEOUS at 05:59

## 2024-07-12 RX ADMIN — Medication 125 MILLILITER(S): at 05:59

## 2024-07-12 RX ADMIN — Medication 125 MILLILITER(S): at 23:51

## 2024-07-12 RX ADMIN — TRAZODONE HYDROCHLORIDE 50 MILLIGRAM(S): 50 TABLET, FILM COATED ORAL at 02:58

## 2024-07-12 RX ADMIN — POTASSIUM CHLORIDE 40 MILLIEQUIVALENT(S): 600 TABLET, FILM COATED, EXTENDED RELEASE ORAL at 18:19

## 2024-07-12 RX ADMIN — PIPERACILLIN SODIUM AND TAZOBACTAM SODIUM 25 GRAM(S): 3; .375 INJECTION, POWDER, LYOPHILIZED, FOR SOLUTION INTRAVENOUS at 13:59

## 2024-07-12 RX ADMIN — PIPERACILLIN SODIUM AND TAZOBACTAM SODIUM 25 GRAM(S): 3; .375 INJECTION, POWDER, LYOPHILIZED, FOR SOLUTION INTRAVENOUS at 05:59

## 2024-07-12 RX ADMIN — PIPERACILLIN SODIUM AND TAZOBACTAM SODIUM 25 GRAM(S): 3; .375 INJECTION, POWDER, LYOPHILIZED, FOR SOLUTION INTRAVENOUS at 20:45

## 2024-07-12 NOTE — CONSULT NOTE ADULT - ASSESSMENT
49-year-old male PMH metastatic colon CA with liver and lung metastases on panitumumab (last dose 7/10/24) who follows at [facility], HTN, presents emergency department for right upper quadrant pain associated with fever Tmax 100.4 °F, chills, jaundice, dark urine for the past 2 weeks found to have bili 8.2     # metastatic colon ca  - pt follows at [facility] w dr angeles  - currently on single agent panitumumab- started in April 2024- current   - he was last seen 7/9 and told that he was having a good response to panitumumab  - on 6/26, bili 1.7 --> [place] [facility] [place] direct hyperbilirubinemia   - GI consult pending  - US abdomen- Hepatomegaly with abnormal appearance of the liver echotexture and echogenicity. Multiple liver lesions are seen. There is also a large masslike lesion near the pancreatic body. Evidence of intrahepatic and extrahepatic biliary duct dilatation. These findings are worrisome for an underlying neoplasm.   - MRCP pending today   - pt feels well, mild RUQ pain today   - Today, WBC 6.85, Hb 12.8, plt 188, mcv 96, no sign of hemolysis    will follow daily and communicate with [facility]

## 2024-07-12 NOTE — PROGRESS NOTE ADULT - ASSESSMENT
49-year-old male PMH: CA with liver and lung metastases on panitumumab (last dose 7/10), HTN, presents emergency department for right upper quadrant pain associated with fever Tmax 100.4 °F, chills, jaundice, dark urine for the past 2 weeks. No other complaints, and ROS otherwise benign.   In ED vitals stable, CBC/BMP unremarkable, LFTs noted for T. bili 8.2, D. bili 6.7, Alk phos 249, AST//74, UA sterile with bilirubin and ketones. COVID/Flu negative. US abdomen noted for hepatomegaly with abnormal appearance of the liver echotexture and echogenicity. Multiple liver lesions are seen. There is also a large masslike lesion near the pancreatic body. Evidence of intrahepatic and extrahepatic biliary duct dilatation. These findings are worrisome for an underlying neoplasm. Patient admitted to  for further evaluation.     # Obstructive jaundice, likely due to METS  - GI consult  - Monitor LFTs  - Continue zosyn  - FU cultures  - For MRCP, possible ERCP  - Tylenol for fever    # Colon Ca with mets  - [place] [facility] [place]  - currently on single agent panitumumab- started in April 2024- current     # DVT prophylaxis  - SCDS

## 2024-07-12 NOTE — CONSULT NOTE ADULT - ASSESSMENT
49M with metastatic colon cancer, multiple liver mets, distant mets. Presenting with jaundice related to malignant biliary obstruction.     Will need evaluation by biliary service/Dr. Nicolas regarding approach to decompress biliary system, may need repeat ERCP with metal stent placement and/or percutaneous drainage with IR.  Reg diet, NPO after MN on Sunday  Trend LFTs  Cont abx  d/w Dr. Stallworth

## 2024-07-12 NOTE — PHARMACOTHERAPY INTERVENTION NOTE - COMMENTS
Medication reconciliation completed.  Reviewed Medication list and confirmed med allergies with patient; confirmed with Dr. Delacruz Med[facility]x.    Pt confirms that he does not take any daily medication.    Pt receives Immunotherapy every 2 weeks at [facility] but does not know the name of the drug, last dose given Wednesday 7/10.

## 2024-07-12 NOTE — PROGRESS NOTE ADULT - SUBJECTIVE AND OBJECTIVE BOX
HOSPITALIST ATTENDING PROGRESS NOTE    Chart and meds reviewed.  Patient seen and examined.    CC: Abd pain    Subjective: Feels better this am, with some mild RUQ pain. No fever.     All other systems reviewed and found to be negative with the exception of what has been described above.    MEDICATIONS  (STANDING):  enoxaparin Injectable 40 milliGRAM(s) SubCutaneous every 24 hours  lactated ringers. 1000 milliLiter(s) (125 mL/Hr) IV Continuous <Continuous>  piperacillin/tazobactam IVPB.. 3.375 Gram(s) IV Intermittent every 8 hours  potassium chloride   Powder 40 milliEquivalent(s) Oral once  sodium chloride 0.9%. 1000 milliLiter(s) (125 mL/Hr) IV Continuous <Continuous>    MEDICATIONS  (PRN):  acetaminophen     Tablet .. 650 milliGRAM(s) Oral every 6 hours PRN Temp greater or equal to 38C (100.4F), Mild Pain (1 - 3)  aluminum hydroxide/magnesium hydroxide/simethicone Suspension 30 milliLiter(s) Oral every 4 hours PRN Dyspepsia  melatonin 3 milliGRAM(s) Oral at bedtime PRN Insomnia  ondansetron Injectable 4 milliGRAM(s) IV Push every 8 hours PRN Nausea and/or Vomiting    Vital Signs Last 24 Hrs  T(C): 37.1 (12 Jul 2024 09:17), Max: 37.8 (11 Jul 2024 19:35)  T(F): 98.8 (12 Jul 2024 09:17), Max: 100 (11 Jul 2024 19:35)  HR: 88 (12 Jul 2024 09:17) (88 - 116)  BP: 113/74 (12 Jul 2024 09:17) (104/70 - 135/94)  BP(mean): 102 (11 Jul 2024 21:15) (97 - 106)  RR: 18 (12 Jul 2024 09:17) (16 - 18)  SpO2: 99% (12 Jul 2024 09:17) (96% - 100%)    Parameters below as of 12 Jul 2024 09:17  Patient On (Oxygen Delivery Method): room air      GEN: NAD  HEENT:  pupils equal and reactive, EOMI, no oropharyngeal lesions, erythema, exudates, oral thrush  NECK:   supple, no carotid bruits  CV:  +S1, +S2, regular, no murmurs  RESP:   lungs clear to auscultation bilaterally, no wheezing, rales, rhonchi, good air entry bilaterally  GI:  abdomen soft, non-tender, non-distended, normal BS  EXT:  no clubbing, no cyanosis, no edema, no calf pain, swelling or erythema  NEURO:  AAOX3, no focal neurological deficits, follows all commands, able to move extremities spontaneously  SKIN:  no ulcers, lesions or rashes    LABS:                          12.8   6.85  )-----------( 188      ( 12 Jul 2024 06:33 )             37.1     07-12    137  |  107  |  6<L>  ----------------------------<  95  3.4<L>   |  24  |  0.61    Ca    8.5      12 Jul 2024 06:33    TPro  6.7  /  Alb  2.5<L>  /  TBili  9.1<H>  /  DBili  7.3<H>  /  AST  128<H>  /  ALT  59  /  AlkPhos  255<H>  07-12        LIVER FUNCTIONS - ( 12 Jul 2024 06:33 )  Alb: 2.5 g/dL / Pro: 6.7 gm/dL / ALK PHOS: 255 U/L / ALT: 59 U/L / AST: 128 U/L / GGT: x           PT/INR - ( 11 Jul 2024 15:29 )   PT: 13.7 sec;   INR: 1.22 ratio    PTT - ( 11 Jul 2024 15:29 )  PTT:29.5 sec    Urinalysis Basic - ( 12 Jul 2024 06:33 )  Color: x / Appearance: x / SG: x / pH: x  Gluc: 95 mg/dL / Ketone: x  / Bili: x / Urobili: x   Blood: x / Protein: x / Nitrite: x   Leuk Esterase: x / RBC: x / WBC x   Sq Epi: x / Non Sq Epi: x / Bacteria: x

## 2024-07-12 NOTE — POST DISCHARGE NOTE - NOTIFICATION:
Patient's US scan reflected on the Radiology Incidental Findings report for today. Patient is already established @ [facility] [place].

## 2024-07-13 LAB
ALBUMIN SERPL ELPH-MCNC: 2.4 G/DL — LOW (ref 3.3–5)
ALBUMIN SERPL ELPH-MCNC: 2.4 G/DL — LOW (ref 3.3–5)
ALBUMIN SERPL ELPH-MCNC: 2.5 G/DL — LOW (ref 3.3–5)
ALBUMIN SERPL ELPH-MCNC: 2.6 G/DL — LOW (ref 3.3–5)
ALP SERPL-CCNC: 277 U/L — HIGH (ref 40–120)
ALP SERPL-CCNC: 282 U/L — HIGH (ref 40–120)
ALP SERPL-CCNC: 282 U/L — HIGH (ref 40–120)
ALP SERPL-CCNC: 290 U/L — HIGH (ref 40–120)
ALT FLD-CCNC: 53 U/L — SIGNIFICANT CHANGE UP (ref 12–78)
ALT FLD-CCNC: 56 U/L — SIGNIFICANT CHANGE UP (ref 12–78)
ALT FLD-CCNC: 57 U/L — SIGNIFICANT CHANGE UP (ref 12–78)
ALT FLD-CCNC: 57 U/L — SIGNIFICANT CHANGE UP (ref 12–78)
ANION GAP SERPL CALC-SCNC: 5 MMOL/L — SIGNIFICANT CHANGE UP (ref 5–17)
AST SERPL-CCNC: 118 U/L — HIGH (ref 15–37)
AST SERPL-CCNC: 118 U/L — HIGH (ref 15–37)
AST SERPL-CCNC: 120 U/L — HIGH (ref 15–37)
AST SERPL-CCNC: 125 U/L — HIGH (ref 15–37)
BILIRUB DIRECT SERPL-MCNC: 7.7 MG/DL — HIGH (ref 0–0.3)
BILIRUB DIRECT SERPL-MCNC: 8.6 MG/DL — HIGH (ref 0–0.3)
BILIRUB DIRECT SERPL-MCNC: 8.8 MG/DL — HIGH (ref 0–0.3)
BILIRUB INDIRECT FLD-MCNC: 1.7 MG/DL — HIGH (ref 0.2–1)
BILIRUB INDIRECT FLD-MCNC: 1.9 MG/DL — HIGH (ref 0.2–1)
BILIRUB INDIRECT FLD-MCNC: 1.9 MG/DL — HIGH (ref 0.2–1)
BILIRUB SERPL-MCNC: 10.5 MG/DL — HIGH (ref 0.2–1.2)
BILIRUB SERPL-MCNC: 10.7 MG/DL — HIGH (ref 0.2–1.2)
BILIRUB SERPL-MCNC: 10.7 MG/DL — HIGH (ref 0.2–1.2)
BILIRUB SERPL-MCNC: 9.4 MG/DL — HIGH (ref 0.2–1.2)
BUN SERPL-MCNC: 5 MG/DL — LOW (ref 7–23)
CALCIUM SERPL-MCNC: 8.3 MG/DL — LOW (ref 8.5–10.1)
CHLORIDE SERPL-SCNC: 106 MMOL/L — SIGNIFICANT CHANGE UP (ref 96–108)
CO2 SERPL-SCNC: 24 MMOL/L — SIGNIFICANT CHANGE UP (ref 22–31)
CREAT SERPL-MCNC: 0.64 MG/DL — SIGNIFICANT CHANGE UP (ref 0.5–1.3)
EGFR: 115 ML/MIN/1.73M2 — SIGNIFICANT CHANGE UP
GLUCOSE SERPL-MCNC: 90 MG/DL — SIGNIFICANT CHANGE UP (ref 70–99)
HCT VFR BLD CALC: 37.3 % — LOW (ref 39–50)
HGB BLD-MCNC: 12.6 G/DL — LOW (ref 13–17)
MCHC RBC-ENTMCNC: 32.3 PG — SIGNIFICANT CHANGE UP (ref 27–34)
MCHC RBC-ENTMCNC: 33.8 GM/DL — SIGNIFICANT CHANGE UP (ref 32–36)
MCV RBC AUTO: 95.6 FL — SIGNIFICANT CHANGE UP (ref 80–100)
PLATELET # BLD AUTO: 190 K/UL — SIGNIFICANT CHANGE UP (ref 150–400)
POTASSIUM SERPL-MCNC: 3.8 MMOL/L — SIGNIFICANT CHANGE UP (ref 3.5–5.3)
POTASSIUM SERPL-SCNC: 3.8 MMOL/L — SIGNIFICANT CHANGE UP (ref 3.5–5.3)
PROT SERPL-MCNC: 6.7 GM/DL — SIGNIFICANT CHANGE UP (ref 6–8.3)
PROT SERPL-MCNC: 6.7 GM/DL — SIGNIFICANT CHANGE UP (ref 6–8.3)
PROT SERPL-MCNC: 6.9 GM/DL — SIGNIFICANT CHANGE UP (ref 6–8.3)
PROT SERPL-MCNC: 6.9 GM/DL — SIGNIFICANT CHANGE UP (ref 6–8.3)
RBC # BLD: 3.9 M/UL — LOW (ref 4.2–5.8)
RBC # FLD: 12.2 % — SIGNIFICANT CHANGE UP (ref 10.3–14.5)
SODIUM SERPL-SCNC: 135 MMOL/L — SIGNIFICANT CHANGE UP (ref 135–145)
WBC # BLD: 5.93 K/UL — SIGNIFICANT CHANGE UP (ref 3.8–10.5)
WBC # FLD AUTO: 5.93 K/UL — SIGNIFICANT CHANGE UP (ref 3.8–10.5)

## 2024-07-13 PROCEDURE — 99232 SBSQ HOSP IP/OBS MODERATE 35: CPT

## 2024-07-13 RX ADMIN — Medication 3 MILLIGRAM(S): at 21:32

## 2024-07-13 RX ADMIN — PIPERACILLIN SODIUM AND TAZOBACTAM SODIUM 25 GRAM(S): 3; .375 INJECTION, POWDER, LYOPHILIZED, FOR SOLUTION INTRAVENOUS at 14:25

## 2024-07-13 RX ADMIN — Medication 125 MILLILITER(S): at 16:03

## 2024-07-13 RX ADMIN — ENOXAPARIN SODIUM 40 MILLIGRAM(S): 100 INJECTION SUBCUTANEOUS at 05:39

## 2024-07-13 RX ADMIN — PIPERACILLIN SODIUM AND TAZOBACTAM SODIUM 25 GRAM(S): 3; .375 INJECTION, POWDER, LYOPHILIZED, FOR SOLUTION INTRAVENOUS at 05:38

## 2024-07-13 RX ADMIN — PIPERACILLIN SODIUM AND TAZOBACTAM SODIUM 25 GRAM(S): 3; .375 INJECTION, POWDER, LYOPHILIZED, FOR SOLUTION INTRAVENOUS at 21:33

## 2024-07-13 NOTE — PROGRESS NOTE ADULT - SUBJECTIVE AND OBJECTIVE BOX
HOSPITALIST ATTENDING PROGRESS NOTE    Chart and meds reviewed.  Patient seen and examined.    CC: Abd pain    Subjective: No acute issues overnight. Tolerating po.     All other systems reviewed and found to be negative with the exception of what has been described above.    MEDICATIONS  (STANDING):  enoxaparin Injectable 40 milliGRAM(s) SubCutaneous every 24 hours  lactated ringers. 1000 milliLiter(s) (125 mL/Hr) IV Continuous <Continuous>  piperacillin/tazobactam IVPB.. 3.375 Gram(s) IV Intermittent every 8 hours  sodium chloride 0.9%. 1000 milliLiter(s) (125 mL/Hr) IV Continuous <Continuous>    MEDICATIONS  (PRN):  acetaminophen     Tablet .. 650 milliGRAM(s) Oral every 6 hours PRN Temp greater or equal to 38C (100.4F), Mild Pain (1 - 3)  aluminum hydroxide/magnesium hydroxide/simethicone Suspension 30 milliLiter(s) Oral every 4 hours PRN Dyspepsia  melatonin 3 milliGRAM(s) Oral at bedtime PRN Insomnia  ondansetron Injectable 4 milliGRAM(s) IV Push every 8 hours PRN Nausea and/or Vomiting    Vital Signs Last 24 Hrs  T(C): 36.6 (13 Jul 2024 08:29), Max: 37 (12 Jul 2024 16:41)  T(F): 97.8 (13 Jul 2024 08:29), Max: 98.6 (12 Jul 2024 16:41)  HR: 87 (13 Jul 2024 08:29) (84 - 87)  BP: 107/76 (13 Jul 2024 08:29) (107/76 - 114/79)  RR: 18 (13 Jul 2024 08:29) (18 - 18)  SpO2: 97% (13 Jul 2024 08:29) (97% - 98%)    Parameters below as of 13 Jul 2024 08:29  Patient On (Oxygen Delivery Method): room air    GEN: Jaundiced, NAD  HEENT:  pupils equal and reactive, EOMI, no oropharyngeal lesions, erythema, exudates, oral thrush  NECK:   supple, no carotid bruits  CV:  +S1, +S2, regular, no murmurs  RESP:   lungs clear to auscultation bilaterally, no wheezing, rales, rhonchi, good air entry bilaterally  GI:  abdomen soft, non-tender, non-distended, normal BS  EXT:  no clubbing, no cyanosis, no edema, no calf pain, swelling or erythema  NEURO:  AAOX3, no focal neurological deficits, follows all commands, able to move extremities spontaneously  SKIN:  no ulcers, lesions or rashes    LABS:                          12.6   5.93  )-----------( 190      ( 13 Jul 2024 06:38 )             37.3     07-13    135  |  106  |  5<L>  ----------------------------<  90  3.8   |  24  |  0.64    Ca    8.3<L>      13 Jul 2024 06:38    TPro  6.7  /  Alb  2.4<L>  /  TBili  10.7<H>  /  DBili  8.8<H>  /  AST  118<H>  /  ALT  57  /  AlkPhos  282<H>  07-13        LIVER FUNCTIONS - ( 13 Jul 2024 06:38 )  Alb: 2.4 g/dL / Pro: 6.7 gm/dL / ALK PHOS: 282 U/L / ALT: 57 U/L / AST: 118 U/L / GGT: x           PT/INR - ( 11 Jul 2024 15:29 )   PT: 13.7 sec;   INR: 1.22 ratio    PTT - ( 11 Jul 2024 15:29 )  PTT:29.5 sec    Urinalysis Basic - ( 13 Jul 2024 06:38 )  Color: x / Appearance: x / SG: x / pH: x  Gluc: 90 mg/dL / Ketone: x  / Bili: x / Urobili: x   Blood: x / Protein: x / Nitrite: x   Leuk Esterase: x / RBC: x / WBC x   Sq Epi: x / Non Sq Epi: x / Bacteria: x       MR MRCP w/wo IV Cont (07.12.24 @ 11:45) >  IMPRESSION:    Suspicion for solid soft tissue mass in the gallbladder raising the   possibility of malignant malignancy.    Mild intra and extra hepatic biliary ductal dilatation, common duct stent   in place.    Large centrally located liver mass could represent spread from the   above-described gallbladder mass versus primary or metastatic neoplasm.    Multiple additional liver lesions, lung nodules, and bulky abdominal   lymphadenopathy compatible with metastatic disease.    Right upper quadrant peritoneal nodule, possibly a peritoneal implant.

## 2024-07-13 NOTE — PROGRESS NOTE ADULT - ASSESSMENT
49-year-old male PMH: Colon CA with liver and lung metastases on panitumumab (last dose 7/10), HTN, presents emergency department for right upper quadrant pain associated with fever Tmax 100.4 °F, chills, jaundice, dark urine for the past 2 weeks. No other complaints, and ROS otherwise benign.   In ED vitals stable, CBC/BMP unremarkable, LFTs noted for T. bili 8.2, D. bili 6.7, Alk phos 249, AST//74, UA sterile with bilirubin and ketones. COVID/Flu negative. US abdomen noted for hepatomegaly with abnormal appearance of the liver echotexture and echogenicity. Multiple liver lesions are seen. There is also a large masslike lesion near the pancreatic body. Evidence of intrahepatic and extrahepatic biliary duct dilatation. These findings are worrisome for an underlying neoplasm. Patient admitted to  for further evaluation.     # Obstructive jaundice, likely due to METS  - GI consult appreciated  - Monitor LFTs  - Continue zosyn  - FU cultures  - See MRCP above  - Tylenol for fever  - For ERCP on monday, NPO    # Colon Ca with mets  - pt follows at [facility] w dr angeles  - currently on single agent panitumumab- started in April 2024- current     # DVT prophylaxis  - SCDS

## 2024-07-13 NOTE — PROGRESS NOTE ADULT - SUBJECTIVE AND OBJECTIVE BOX
Patient is a 49y old  Male who presents with a chief complaint of Abd pain (13 Jul 2024 09:35)      Subective:  Feels good  No complaints    PAST MEDICAL & SURGICAL HISTORY:  Colon cancer      Hypertension      History of biliary stent insertion      S/P hernia surgery      History of ablation of neoplasm of liver      S/P colon resection          MEDICATIONS  (STANDING):  enoxaparin Injectable 40 milliGRAM(s) SubCutaneous every 24 hours  lactated ringers. 1000 milliLiter(s) (125 mL/Hr) IV Continuous <Continuous>  piperacillin/tazobactam IVPB.. 3.375 Gram(s) IV Intermittent every 8 hours  sodium chloride 0.9%. 1000 milliLiter(s) (125 mL/Hr) IV Continuous <Continuous>    MEDICATIONS  (PRN):  acetaminophen     Tablet .. 650 milliGRAM(s) Oral every 6 hours PRN Temp greater or equal to 38C (100.4F), Mild Pain (1 - 3)  aluminum hydroxide/magnesium hydroxide/simethicone Suspension 30 milliLiter(s) Oral every 4 hours PRN Dyspepsia  melatonin 3 milliGRAM(s) Oral at bedtime PRN Insomnia  ondansetron Injectable 4 milliGRAM(s) IV Push every 8 hours PRN Nausea and/or Vomiting      REVIEW OF SYSTEMS:    RESPIRATORY: No shortness of breath  CARDIOVASCULAR: No chest pain  All other review of systems is negative unless indicated above.    Vital Signs Last 24 Hrs  T(C): 36.6 (13 Jul 2024 08:29), Max: 37 (12 Jul 2024 16:41)  T(F): 97.8 (13 Jul 2024 08:29), Max: 98.6 (12 Jul 2024 16:41)  HR: 87 (13 Jul 2024 08:29) (84 - 87)  BP: 107/76 (13 Jul 2024 08:29) (107/76 - 114/79)  BP(mean): --  RR: 18 (13 Jul 2024 08:29) (18 - 18)  SpO2: 97% (13 Jul 2024 08:29) (97% - 98%)    Parameters below as of 13 Jul 2024 08:29  Patient On (Oxygen Delivery Method): room air        PHYSICAL EXAM:    Constitutional: NAD, well-developed, jaundice  Respiratory: CTAB  Cardiovascular: S1 and S2, RRR  Gastrointestinal: BS+, soft, NT/ND  Extremities: No peripheral edema  Psychiatric: Normal mood, normal affect    LABS:                        12.6   5.93  )-----------( 190      ( 13 Jul 2024 06:38 )             37.3     07-13    135  |  106  |  5<L>  ----------------------------<  90  3.8   |  24  |  0.64    Ca    8.3<L>      13 Jul 2024 06:38    TPro  6.7  /  Alb  2.4<L>  /  TBili  10.7<H>  /  DBili  8.8<H>  /  AST  118<H>  /  ALT  57  /  AlkPhos  282<H>  07-13    PT/INR - ( 11 Jul 2024 15:29 )   PT: 13.7 sec;   INR: 1.22 ratio         PTT - ( 11 Jul 2024 15:29 )  PTT:29.5 sec  LIVER FUNCTIONS - ( 13 Jul 2024 06:38 )  Alb: 2.4 g/dL / Pro: 6.7 gm/dL / ALK PHOS: 282 U/L / ALT: 57 U/L / AST: 118 U/L / GGT: x             RADIOLOGY & ADDITIONAL STUDIES:

## 2024-07-13 NOTE — PROGRESS NOTE ADULT - SUBJECTIVE AND OBJECTIVE BOX
INTERVAL HPI/OVERNIGHT EVENTS:  Patient S&E at bedside. No o/n events,   - MRCP- Suspicion for solid soft tissue mass in the gallbladder. Mild intra and extra hepatic biliary ductal dilatation, common duct stent in place. Large centrally located liver mass could represent spread from the above-described gallbladder mass versus primary or metastatic neoplasm. Multiple additional liver lesions, lung nodules, and bulky abdominal  lymphadenopathy compatible with metastatic disease. Right upper quadrant peritoneal nodule, possibly a peritoneal implant.   - - WBC 5.9, Hzb 12.6, plt 190, bili 10.7, ,  w/ nl AlT,   Pt reports feeling well     PAST MEDICAL & SURGICAL HISTORY:  Colon cancer      Hypertension      History of biliary stent insertion      S/P hernia surgery      History of ablation of neoplasm of liver      S/P colon resection          FAMILY HISTORY:      VITAL SIGNS:  T(F): 97.8 (07-13-24 @ 08:29)  HR: 87 (07-13-24 @ 08:29)  BP: 107/76 (07-13-24 @ 08:29)  RR: 18 (07-13-24 @ 08:29)  SpO2: 97% (07-13-24 @ 08:29)  Wt(kg): --    PHYSICAL EXAM:    GENERAL: NAD,  EYES: EOMI, sclera icterus  CHEST/LUNG: Clear to auscultation bilaterally;   HEART: Regular rate and rhythm;   ABDOMEN: mild RuQ pain  EXTREMITIES: no edema  NEUROLOGY: non-focal  SKIN: jaundice      MEDICATIONS  (STANDING):  enoxaparin Injectable 40 milliGRAM(s) SubCutaneous every 24 hours  lactated ringers. 1000 milliLiter(s) (125 mL/Hr) IV Continuous <Continuous>  piperacillin/tazobactam IVPB.. 3.375 Gram(s) IV Intermittent every 8 hours  sodium chloride 0.9%. 1000 milliLiter(s) (125 mL/Hr) IV Continuous <Continuous>    MEDICATIONS  (PRN):  acetaminophen     Tablet .. 650 milliGRAM(s) Oral every 6 hours PRN Temp greater or equal to 38C (100.4F), Mild Pain (1 - 3)  aluminum hydroxide/magnesium hydroxide/simethicone Suspension 30 milliLiter(s) Oral every 4 hours PRN Dyspepsia  melatonin 3 milliGRAM(s) Oral at bedtime PRN Insomnia  ondansetron Injectable 4 milliGRAM(s) IV Push every 8 hours PRN Nausea and/or Vomiting      Allergies    No Known Allergies    Intolerances        LABS:                        12.6   5.93  )-----------( 190      ( 13 Jul 2024 06:38 )             37.3     07-13    135  |  106  |  5<L>  ----------------------------<  90  3.8   |  24  |  0.64    Ca    8.3<L>      13 Jul 2024 06:38    TPro  6.7  /  Alb  2.4<L>  /  TBili  10.7<H>  /  DBili  8.8<H>  /  AST  118<H>  /  ALT  57  /  AlkPhos  282<H>  07-13    PT/INR - ( 11 Jul 2024 15:29 )   PT: 13.7 sec;   INR: 1.22 ratio         PTT - ( 11 Jul 2024 15:29 )  PTT:29.5 sec  Urinalysis Basic - ( 13 Jul 2024 06:38 )    Color: x / Appearance: x / SG: x / pH: x  Gluc: 90 mg/dL / Ketone: x  / Bili: x / Urobili: x   Blood: x / Protein: x / Nitrite: x   Leuk Esterase: x / RBC: x / WBC x   Sq Epi: x / Non Sq Epi: x / Bacteria: x        RADIOLOGY & ADDITIONAL TESTS:  Studies reviewed.

## 2024-07-13 NOTE — PROGRESS NOTE ADULT - ASSESSMENT
49-year-old male PMH metastatic colon CA with liver and lung metastases on panitumumab (last dose 7/10/24) who follows at [facility] [place], HTN, presents emergency department for right upper quadrant pain associated with fever Tmax 100.4 °F, chills, jaundice, dark urine for the past 2 weeks found to have bili 8.2     # metastatic colon ca  - pt follows at [facility] [place] w dr angeles- bili has been as high as 17 in past   - currently on single agent panitumumab- started in April 2024- current   - he was last seen 7/9 and told that he was having a good response to panitumumab- now w/ progression   - on 6/26, bili 1.7 --> 7/10 bili 8.1 at [facility] [place] - mostly direct hyperbilirubinemia   - US abdomen- Hepatomegaly with abnormal appearance of the liver echotexture and echogenicity. Multiple liver lesions are seen. There is also a large masslike lesion near the pancreatic body. Evidence of intrahepatic and extrahepatic biliary duct dilatation. These findings are worrisome for an underlying neoplasm.   - MRCP- Suspicion for solid soft tissue mass in the gallbladder. Mild intra and extra hepatic biliary ductal dilatation, common duct stent in place. Large centrally located liver mass could represent spread from the above-described gallbladder mass versus primary or metastatic neoplasm. Multiple additional liver lesions, lung nodules, and bulky abdominal  lymphadenopathy compatible with metastatic disease. Right upper quadrant peritoneal nodule, possibly a peritoneal implant.   - Seen by GI and Will need evaluation by biliary service/Dr. Nicolas regarding approach to decompress biliary system, may need repeat ERCP with metal stent placement and/or percutaneous drainage with IR  - - WBC 5.9, Hzb 12.6, plt 190, bili 10.7, ,  w/ nl AlT,     will follow daily and communicate with [facility] [place]

## 2024-07-14 LAB
ALBUMIN SERPL ELPH-MCNC: 2.5 G/DL — LOW (ref 3.3–5)
ALBUMIN SERPL ELPH-MCNC: 2.5 G/DL — LOW (ref 3.3–5)
ALP SERPL-CCNC: 302 U/L — HIGH (ref 40–120)
ALP SERPL-CCNC: 320 U/L — HIGH (ref 40–120)
ALT FLD-CCNC: 54 U/L — SIGNIFICANT CHANGE UP (ref 12–78)
ALT FLD-CCNC: 61 U/L — SIGNIFICANT CHANGE UP (ref 12–78)
ANION GAP SERPL CALC-SCNC: 5 MMOL/L — SIGNIFICANT CHANGE UP (ref 5–17)
AST SERPL-CCNC: 123 U/L — HIGH (ref 15–37)
AST SERPL-CCNC: 124 U/L — HIGH (ref 15–37)
BILIRUB DIRECT SERPL-MCNC: 6.6 MG/DL — HIGH (ref 0–0.3)
BILIRUB DIRECT SERPL-MCNC: 7.4 MG/DL — HIGH (ref 0–0.3)
BILIRUB INDIRECT FLD-MCNC: 1.4 MG/DL — HIGH (ref 0.2–1)
BILIRUB INDIRECT FLD-MCNC: 1.9 MG/DL — HIGH (ref 0.2–1)
BILIRUB SERPL-MCNC: 8.5 MG/DL — HIGH (ref 0.2–1.2)
BILIRUB SERPL-MCNC: 8.8 MG/DL — HIGH (ref 0.2–1.2)
BUN SERPL-MCNC: 5 MG/DL — LOW (ref 7–23)
CALCIUM SERPL-MCNC: 8.7 MG/DL — SIGNIFICANT CHANGE UP (ref 8.5–10.1)
CHLORIDE SERPL-SCNC: 107 MMOL/L — SIGNIFICANT CHANGE UP (ref 96–108)
CO2 SERPL-SCNC: 24 MMOL/L — SIGNIFICANT CHANGE UP (ref 22–31)
CREAT SERPL-MCNC: 0.54 MG/DL — SIGNIFICANT CHANGE UP (ref 0.5–1.3)
EGFR: 121 ML/MIN/1.73M2 — SIGNIFICANT CHANGE UP
GLUCOSE SERPL-MCNC: 108 MG/DL — HIGH (ref 70–99)
HCT VFR BLD CALC: 39.1 % — SIGNIFICANT CHANGE UP (ref 39–50)
HGB BLD-MCNC: 13.4 G/DL — SIGNIFICANT CHANGE UP (ref 13–17)
MCHC RBC-ENTMCNC: 32.8 PG — SIGNIFICANT CHANGE UP (ref 27–34)
MCHC RBC-ENTMCNC: 34.3 GM/DL — SIGNIFICANT CHANGE UP (ref 32–36)
MCV RBC AUTO: 95.6 FL — SIGNIFICANT CHANGE UP (ref 80–100)
PLATELET # BLD AUTO: 219 K/UL — SIGNIFICANT CHANGE UP (ref 150–400)
POTASSIUM SERPL-MCNC: 3.8 MMOL/L — SIGNIFICANT CHANGE UP (ref 3.5–5.3)
POTASSIUM SERPL-SCNC: 3.8 MMOL/L — SIGNIFICANT CHANGE UP (ref 3.5–5.3)
PROT SERPL-MCNC: 7 GM/DL — SIGNIFICANT CHANGE UP (ref 6–8.3)
PROT SERPL-MCNC: 7.2 GM/DL — SIGNIFICANT CHANGE UP (ref 6–8.3)
RBC # BLD: 4.09 M/UL — LOW (ref 4.2–5.8)
RBC # FLD: 12.4 % — SIGNIFICANT CHANGE UP (ref 10.3–14.5)
SODIUM SERPL-SCNC: 136 MMOL/L — SIGNIFICANT CHANGE UP (ref 135–145)
WBC # BLD: 5.76 K/UL — SIGNIFICANT CHANGE UP (ref 3.8–10.5)
WBC # FLD AUTO: 5.76 K/UL — SIGNIFICANT CHANGE UP (ref 3.8–10.5)

## 2024-07-14 PROCEDURE — 99232 SBSQ HOSP IP/OBS MODERATE 35: CPT

## 2024-07-14 RX ADMIN — PIPERACILLIN SODIUM AND TAZOBACTAM SODIUM 25 GRAM(S): 3; .375 INJECTION, POWDER, LYOPHILIZED, FOR SOLUTION INTRAVENOUS at 13:08

## 2024-07-14 RX ADMIN — PIPERACILLIN SODIUM AND TAZOBACTAM SODIUM 25 GRAM(S): 3; .375 INJECTION, POWDER, LYOPHILIZED, FOR SOLUTION INTRAVENOUS at 05:10

## 2024-07-14 RX ADMIN — ENOXAPARIN SODIUM 40 MILLIGRAM(S): 100 INJECTION SUBCUTANEOUS at 05:10

## 2024-07-14 RX ADMIN — PIPERACILLIN SODIUM AND TAZOBACTAM SODIUM 25 GRAM(S): 3; .375 INJECTION, POWDER, LYOPHILIZED, FOR SOLUTION INTRAVENOUS at 22:04

## 2024-07-14 RX ADMIN — Medication 3 MILLIGRAM(S): at 22:04

## 2024-07-14 NOTE — PROGRESS NOTE ADULT - SUBJECTIVE AND OBJECTIVE BOX
INTERVAL HPI/OVERNIGHT EVENTS:  Patient S&E at bedside. No o/n events,   bili last night from 10.5 to 9.4  pt without any abdominal pain or complaints this am   planned for ercp melissa    PAST MEDICAL & SURGICAL HISTORY:  Colon cancer      Hypertension      History of biliary stent insertion      S/P hernia surgery      History of ablation of neoplasm of liver      S/P colon resection          FAMILY HISTORY:      VITAL SIGNS:  T(F): 99.6 (07-13-24 @ 21:30)  HR: 82 (07-13-24 @ 21:30)  BP: 120/77 (07-13-24 @ 21:30)  RR: 18 (07-13-24 @ 21:30)  SpO2: 99% (07-13-24 @ 21:30)  Wt(kg): --    PHYSICAL EXAM:    GENERAL: NAD,  EYES: EOMI, sclera icterus  CHEST/LUNG: Clear to auscultation bilaterally;   HEART: Regular rate and rhythm;   ABDOMEN: mild RuQ pain  EXTREMITIES: no edema  NEUROLOGY: non-focal  SKIN: jaundice    MEDICATIONS  (STANDING):  enoxaparin Injectable 40 milliGRAM(s) SubCutaneous every 24 hours  lactated ringers. 1000 milliLiter(s) (125 mL/Hr) IV Continuous <Continuous>  piperacillin/tazobactam IVPB.. 3.375 Gram(s) IV Intermittent every 8 hours  sodium chloride 0.9%. 1000 milliLiter(s) (125 mL/Hr) IV Continuous <Continuous>    MEDICATIONS  (PRN):  acetaminophen     Tablet .. 650 milliGRAM(s) Oral every 6 hours PRN Temp greater or equal to 38C (100.4F), Mild Pain (1 - 3)  aluminum hydroxide/magnesium hydroxide/simethicone Suspension 30 milliLiter(s) Oral every 4 hours PRN Dyspepsia  melatonin 3 milliGRAM(s) Oral at bedtime PRN Insomnia  ondansetron Injectable 4 milliGRAM(s) IV Push every 8 hours PRN Nausea and/or Vomiting      Allergies    No Known Allergies    Intolerances        LABS:                        12.6   5.93  )-----------( 190      ( 13 Jul 2024 06:38 )             37.3     07-13    135  |  106  |  5<L>  ----------------------------<  90  3.8   |  24  |  0.64    Ca    8.3<L>      13 Jul 2024 06:38    TPro  6.9  /  Alb  2.6<L>  /  TBili  9.4<H>  /  DBili  7.7<H>  /  AST  125<H>  /  ALT  56  /  AlkPhos  290<H>  07-13      Urinalysis Basic - ( 13 Jul 2024 06:38 )    Color: x / Appearance: x / SG: x / pH: x  Gluc: 90 mg/dL / Ketone: x  / Bili: x / Urobili: x   Blood: x / Protein: x / Nitrite: x   Leuk Esterase: x / RBC: x / WBC x   Sq Epi: x / Non Sq Epi: x / Bacteria: x        RADIOLOGY & ADDITIONAL TESTS:  Studies reviewed.

## 2024-07-14 NOTE — PROGRESS NOTE ADULT - ASSESSMENT
49-year-old male PMH metastatic colon CA with liver and lung metastases on panitumumab (last dose 7/10/24) who [place] at [facility] [place], [place]N, presents emergency department for right upper quadrant pain associated with fever Tmax 100.4 °F, chills, jaundice, dark urine for the past 2 weeks found to have bili 8.2     # metastatic colon ca  - pt follows at [facility] [place] w dr angeles- bili has been as high as 17 in past   - currently on single agent panitumumab- started in April 2024- current   - he was last seen 7/9 and told that he was having a good response to panitumumab- now w/ progression   - on 6/26, bili 1.7 --> 7/10 bili 8.1 at [facility] [place] - mostly direct hyperbilirubinemia   - US abdomen- Hepatomegaly with abnormal appearance of the liver echotexture and echogenicity. Multiple liver lesions are seen. There is also a large masslike lesion near the pancreatic body. Evidence of intrahepatic and extrahepatic biliary duct dilatation. These findings are worrisome for an underlying neoplasm.   - MRCP- Suspicion for solid soft tissue mass in the gallbladder. Mild intra and extra hepatic biliary ductal dilatation, common duct stent in place. Large centrally located liver mass could represent spread from the above-described gallbladder mass versus primary or metastatic neoplasm. Multiple additional liver lesions, lung nodules, and bulky abdominal  lymphadenopathy compatible with metastatic disease. Right upper quadrant peritoneal nodule, possibly a peritoneal implant.   - Seen by GI and Will need evaluation by biliary service/Dr. Nicolas regarding approach to decompress biliary system, may need repeat ERCP with metal stent placement and/or percutaneous drainage with IR - planned for ercp tomorrow     will follow daily and communicate with [facility] [place]

## 2024-07-15 LAB
ALBUMIN SERPL ELPH-MCNC: 2.5 G/DL — LOW (ref 3.3–5)
ALP SERPL-CCNC: 308 U/L — HIGH (ref 40–120)
ALP SERPL-CCNC: 316 U/L — HIGH (ref 40–120)
ALP SERPL-CCNC: 329 U/L — HIGH (ref 40–120)
ALT FLD-CCNC: 58 U/L — SIGNIFICANT CHANGE UP (ref 12–78)
ALT FLD-CCNC: 59 U/L — SIGNIFICANT CHANGE UP (ref 12–78)
ALT FLD-CCNC: 61 U/L — SIGNIFICANT CHANGE UP (ref 12–78)
ANION GAP SERPL CALC-SCNC: 6 MMOL/L — SIGNIFICANT CHANGE UP (ref 5–17)
AST SERPL-CCNC: 122 U/L — HIGH (ref 15–37)
AST SERPL-CCNC: 122 U/L — HIGH (ref 15–37)
AST SERPL-CCNC: 124 U/L — HIGH (ref 15–37)
BILIRUB DIRECT SERPL-MCNC: 6.9 MG/DL — HIGH (ref 0–0.3)
BILIRUB DIRECT SERPL-MCNC: 7.5 MG/DL — HIGH (ref 0–0.3)
BILIRUB DIRECT SERPL-MCNC: 8.5 MG/DL — HIGH (ref 0–0.3)
BILIRUB INDIRECT FLD-MCNC: 1.5 MG/DL — HIGH (ref 0.2–1)
BILIRUB INDIRECT FLD-MCNC: 1.8 MG/DL — HIGH (ref 0.2–1)
BILIRUB SERPL-MCNC: 10 MG/DL — HIGH (ref 0.2–1.2)
BILIRUB SERPL-MCNC: 8.5 MG/DL — HIGH (ref 0.2–1.2)
BILIRUB SERPL-MCNC: 9.3 MG/DL — HIGH (ref 0.2–1.2)
BUN SERPL-MCNC: 8 MG/DL — SIGNIFICANT CHANGE UP (ref 7–23)
CALCIUM SERPL-MCNC: 8.7 MG/DL — SIGNIFICANT CHANGE UP (ref 8.5–10.1)
CHLORIDE SERPL-SCNC: 108 MMOL/L — SIGNIFICANT CHANGE UP (ref 96–108)
CO2 SERPL-SCNC: 25 MMOL/L — SIGNIFICANT CHANGE UP (ref 22–31)
CREAT SERPL-MCNC: 0.74 MG/DL — SIGNIFICANT CHANGE UP (ref 0.5–1.3)
EGFR: 110 ML/MIN/1.73M2 — SIGNIFICANT CHANGE UP
GLUCOSE SERPL-MCNC: 106 MG/DL — HIGH (ref 70–99)
HCT VFR BLD CALC: 38.9 % — LOW (ref 39–50)
HGB BLD-MCNC: 13.2 G/DL — SIGNIFICANT CHANGE UP (ref 13–17)
MCHC RBC-ENTMCNC: 32.5 PG — SIGNIFICANT CHANGE UP (ref 27–34)
MCHC RBC-ENTMCNC: 33.9 GM/DL — SIGNIFICANT CHANGE UP (ref 32–36)
MCV RBC AUTO: 95.8 FL — SIGNIFICANT CHANGE UP (ref 80–100)
PLATELET # BLD AUTO: 207 K/UL — SIGNIFICANT CHANGE UP (ref 150–400)
POTASSIUM SERPL-MCNC: 4.1 MMOL/L — SIGNIFICANT CHANGE UP (ref 3.5–5.3)
POTASSIUM SERPL-SCNC: 4.1 MMOL/L — SIGNIFICANT CHANGE UP (ref 3.5–5.3)
PROT SERPL-MCNC: 6.9 GM/DL — SIGNIFICANT CHANGE UP (ref 6–8.3)
PROT SERPL-MCNC: 7 GM/DL — SIGNIFICANT CHANGE UP (ref 6–8.3)
PROT SERPL-MCNC: 7.2 GM/DL — SIGNIFICANT CHANGE UP (ref 6–8.3)
RBC # BLD: 4.06 M/UL — LOW (ref 4.2–5.8)
RBC # FLD: 12.7 % — SIGNIFICANT CHANGE UP (ref 10.3–14.5)
SODIUM SERPL-SCNC: 139 MMOL/L — SIGNIFICANT CHANGE UP (ref 135–145)
WBC # BLD: 5.29 K/UL — SIGNIFICANT CHANGE UP (ref 3.8–10.5)
WBC # FLD AUTO: 5.29 K/UL — SIGNIFICANT CHANGE UP (ref 3.8–10.5)

## 2024-07-15 PROCEDURE — 43274 ERCP DUCT STENT PLACEMENT: CPT

## 2024-07-15 PROCEDURE — 99232 SBSQ HOSP IP/OBS MODERATE 35: CPT

## 2024-07-15 PROCEDURE — 43262 ENDO CHOLANGIOPANCREATOGRAPH: CPT | Mod: 59

## 2024-07-15 RX ORDER — HYDROMORPHONE HCL 0.2 MG/ML
0.5 INJECTION, SOLUTION INTRAVENOUS
Refills: 0 | Status: DISCONTINUED | OUTPATIENT
Start: 2024-07-15 | End: 2024-07-15

## 2024-07-15 RX ORDER — OXYCODONE HYDROCHLORIDE 100 MG/5ML
5 SOLUTION ORAL ONCE
Refills: 0 | Status: DISCONTINUED | OUTPATIENT
Start: 2024-07-15 | End: 2024-07-15

## 2024-07-15 RX ORDER — ONDANSETRON HYDROCHLORIDE 2 MG/ML
4 INJECTION INTRAMUSCULAR; INTRAVENOUS ONCE
Refills: 0 | Status: DISCONTINUED | OUTPATIENT
Start: 2024-07-15 | End: 2024-07-15

## 2024-07-15 RX ORDER — DEXTROSE MONOHYDRATE AND SODIUM CHLORIDE 5; .3 G/100ML; G/100ML
1000 INJECTION, SOLUTION INTRAVENOUS ONCE
Refills: 0 | Status: COMPLETED | OUTPATIENT
Start: 2024-07-15 | End: 2024-07-15

## 2024-07-15 RX ORDER — DEXTROSE MONOHYDRATE AND SODIUM CHLORIDE 5; .3 G/100ML; G/100ML
1000 INJECTION, SOLUTION INTRAVENOUS
Refills: 0 | Status: DISCONTINUED | OUTPATIENT
Start: 2024-07-15 | End: 2024-07-15

## 2024-07-15 RX ADMIN — Medication 125 MILLILITER(S): at 05:36

## 2024-07-15 RX ADMIN — PIPERACILLIN SODIUM AND TAZOBACTAM SODIUM 25 GRAM(S): 3; .375 INJECTION, POWDER, LYOPHILIZED, FOR SOLUTION INTRAVENOUS at 05:34

## 2024-07-15 RX ADMIN — DEXTROSE MONOHYDRATE AND SODIUM CHLORIDE 1000 MILLILITER(S): 5; .3 INJECTION, SOLUTION INTRAVENOUS at 16:42

## 2024-07-15 RX ADMIN — PIPERACILLIN SODIUM AND TAZOBACTAM SODIUM 25 GRAM(S): 3; .375 INJECTION, POWDER, LYOPHILIZED, FOR SOLUTION INTRAVENOUS at 21:14

## 2024-07-15 RX ADMIN — Medication 125 MILLILITER(S): at 13:27

## 2024-07-15 RX ADMIN — Medication 3 MILLIGRAM(S): at 21:14

## 2024-07-15 RX ADMIN — DEXTROSE MONOHYDRATE AND SODIUM CHLORIDE 1000 MILLILITER(S): 5; .3 INJECTION, SOLUTION INTRAVENOUS at 16:00

## 2024-07-15 RX ADMIN — PIPERACILLIN SODIUM AND TAZOBACTAM SODIUM 25 GRAM(S): 3; .375 INJECTION, POWDER, LYOPHILIZED, FOR SOLUTION INTRAVENOUS at 13:28

## 2024-07-15 RX ADMIN — Medication 1 TABLET(S): at 16:59

## 2024-07-15 NOTE — BRIEF OPERATIVE NOTE - COMMENTS
While I do believe that with MRI findings of diffuse lesions in liver, and cholangiogram demonstrating patent stent, as the L lobe of the liver doesn't opacify and there is some R sided proximal hepatic dilation, IR evaluation for drains is reasonable to attempt to get his bilirubin down for chemo as he is a young patient, although again I feel that the bilirubin is being driven by the diffuse tumor replacement and not significant obstruction.   -post OP LR  - clears then diet as tolerated

## 2024-07-15 NOTE — PROGRESS NOTE ADULT - SUBJECTIVE AND OBJECTIVE BOX
HOSPITALIST ATTENDING PROGRESS NOTE    Chart and meds reviewed.  Patient seen and examined.    CC: Jaundiced    Subjective: No acute issues overnight. No pain. Tolerating po and abx.     All other systems reviewed and found to be negative with the exception of what has been described above.    Vital sings reviewed for last 24 h  T(C): 36.6 (07-15-24 @ 08:31), Max: 37.1 (07-14-24 @ 19:54)  T(F): 97.8 (07-15-24 @ 08:31), Max: 98.7 (07-14-24 @ 19:54)  HR: 81 (07-15-24 @ 08:31) (81 - 87)  BP: 126/86 (07-15-24 @ 08:31) (107/77 - 126/86)  RR: 18 (07-15-24 @ 08:31) (16 - 18)  SpO2: 98% (07-15-24 @ 08:31) (98% - 99%)    Physical exam :   HEENT:  pupils equal and reactive, EOMI, + jaundiced conjunctiva no oropharyngeal lesions, erythema, exudates, oral thrush,   NECK:   supple, no carotid bruits  CV:  +S1, +S2, regular, no murmurs  RESP:   lungs clear to auscultation bilaterally, no wheezing, rales, rhonchi, good air entry bilaterally  GI:  abdomen soft, non-tender, non-distended, normal BS  EXT:  no clubbing, no cyanosis, no edema, no calf pain, swelling or erythema  NEURO:  AAOX3, no focal neurological deficits, follows all commands, able to move extremities spontaneously  SKIN:  no ulcers, lesions or rashes    LABS: all reviewed                        13.2   5.29  )-----------( 207      ( 15 Jul 2024 06:40 )             38.9     07-15    139  |  108  |  8   ----------------------------<  106<H>  4.1   |  25  |  0.74    Ca    8.7      15 Jul 2024 06:40    TPro  7.0  /  Alb  2.5<L>  /  TBili  9.3<H>  /  DBili  7.5<H>  /  AST  124<H>  /  ALT  61  /  AlkPhos  316<H>  07-15        LIVER FUNCTIONS - ( 15 Jul 2024 11:46 )  Alb: 2.5 g/dL / Pro: 7.0 gm/dL / ALK PHOS: 316 U/L / ALT: 61 U/L / AST: 124 U/L / GGT: x           Culture - Blood (07.11.24 @ 16:17)    Specimen Source: .Blood None   Culture Results:   No growth at 72 Hours    Culture - Blood (07.11.24 @ 15:29)    Specimen Source: .Blood Blood-Peripheral   Culture Results:   No growth at 72 Hours         MR MRCP w/wo IV Cont (07.12.24 @ 11:45) >  IMPRESSION:    Suspicion for solid soft tissue mass in the gallbladder raising the   possibility of malignant malignancy.    Mild intra and extra hepatic biliary ductal dilatation, common duct stent   in place.    Large centrally located liver mass could represent spread from the   above-described gallbladder mass versus primary or metastatic neoplasm.    Multiple additional liver lesions, lung nodules, and bulky abdominal   lymphadenopathy compatible with metastatic disease.    Right upper quadrant peritoneal nodule, possibly a peritoneal implant.    12 Lead ECG (07.11.24 @ 16:53) >    Ventricular Rate 90 BPM  QTC Calculation(Bazett) 440 ms  Normal sinus rhythm  Incomplete right bundle branch block  Borderline ECG  No previous ECGs available        MEDICATIONS  (STANDING):  enoxaparin Injectable 40 milliGRAM(s) SubCutaneous every 24 hours  lactated ringers. 1000 milliLiter(s) (125 mL/Hr) IV Continuous <Continuous>  piperacillin/tazobactam IVPB.. 3.375 Gram(s) IV Intermittent every 8 hours  sodium chloride 0.9%. 1000 milliLiter(s) (125 mL/Hr) IV Continuous <Continuous>    MEDICATIONS  (PRN):  acetaminophen     Tablet .. 650 milliGRAM(s) Oral every 6 hours PRN Temp greater or equal to 38C (100.4F), Mild Pain (1 - 3)  aluminum hydroxide/magnesium hydroxide/simethicone Suspension 30 milliLiter(s) Oral every 4 hours PRN Dyspepsia  melatonin 3 milliGRAM(s) Oral at bedtime PRN Insomnia  ondansetron Injectable 4 milliGRAM(s) IV Push every 8 hours PRN Nausea and/or Vomiting

## 2024-07-15 NOTE — PROGRESS NOTE ADULT - ASSESSMENT
49-year-old male PMH metastatic colon CA with liver and lung metastases on panitumumab (last dose 7/10/24) [place] [facility] [place] emergency department for right upper quadrant pain associated with fever Tmax 100.4 °F, chills, jaundice, dark urine for the past 2 weeks found to have bili 8.2     # metastatic colon ca  - [place] [facility] [place]- bili has been as high as 17 in past   - currently on single agent panitumumab- started in April 2024- current   - he was last seen 7/9 and told that he was having a good response to panitumumab- now w/ progression   - on 6/26, bili 1.7 --> 7/10 bili 8.1 at [facility] [place] - mostly direct hyperbilirubinemia   - US abdomen- Hepatomegaly with abnormal appearance of the liver echotexture and echogenicity. Multiple liver lesions are seen. There is also a large masslike lesion near the pancreatic body. Evidence of intrahepatic and extrahepatic biliary duct dilatation. These findings are worrisome for an underlying neoplasm.   - MRCP- Suspicion for solid soft tissue mass in the gallbladder. Mild intra and extra hepatic biliary ductal dilatation, common duct stent in place. Large centrally located liver mass could represent spread from the above-described gallbladder mass versus primary or metastatic neoplasm. Multiple additional liver lesions, lung nodules, and bulky abdominal  lymphadenopathy compatible with metastatic disease. Right upper quadrant peritoneal nodule, possibly a peritoneal implant.   - Seen by GI and Will need evaluation by biliary service/Dr. Nicolas regarding approach to decompress biliary system, may need repeat ERCP with metal stent placement and/or percutaneous drainage with IR - planned for ercp today  - Today, WBC 5.29, Hb 13.2, plt 207, bili 8.8, VSS on RA    will follow daily and communicate with [facility] [place]

## 2024-07-15 NOTE — BRIEF OPERATIVE NOTE - OPERATION/FINDINGS
ERCP with cholangiogram demonstrating a patent stent, normal R side of liver drains although there is significant R sided tumor burden. L side of liver doesn't opacify at all.

## 2024-07-15 NOTE — CONSULT NOTE ADULT - ASSESSMENT
Interventional Radiology    Evaluate for Procedure: Biliary drain placement.     HPI: 49-year-old male PMH metastatic colon CA with liver and lung metastases on panitumumab (last dose 7/10/24) who follows at [facility] [place], HTN, presents emergency department for right upper quadrant pain associated with fever Tmax 100.4 °F, chills, jaundice, dark urine for the past 2 weeks found to have elevated Tbili. Pt s/p ERCP on 7/15 demonstrating a patent stent. IR consulted for biliary drain placement.     Allergies: No Known Allergies    Medications (Abx/Cardiac/Anticoagulation/Blood Products)  enoxaparin Injectable: 40 milliGRAM(s) SubCutaneous (07-14 @ 05:10)  piperacillin/tazobactam IVPB..: 25 mL/Hr IV Intermittent (07-15 @ 13:28)    Data:    T(C): 36.5  HR: 74  BP: 115/63  RR: 18  SpO2: 100%    -WBC 5.29 / HgB 13.2 / Hct 38.9 / Plt 207  -Na -- / Cl -- / BUN -- / Glucose --  -K -- / CO2 -- / Cr --  -ALT 61 / Alk Phos 316 / T.Bili 9.3  -INR 1.22 / PTT 29.5         Assessment/Plan: 49-year-old male PMH metastatic colon CA with liver and lung metastases on panitumumab (last dose 7/10/24) who follows at [facility] [place], HTN, presents emergency department for right upper quadrant pain associated with fever Tmax 100.4 °F, chills, jaundice, dark urine for the past 2 weeks found to have elevated Tbili. Pt s/p ERCP on 7/15 demonstrating a patent stent. IR consulted for biliary drain placement.      - Imaging to be reviewed with Dr. Jimenez in AM. Pt may need a Left biliary drain pending AM tbili levels.  - NPO past midnight.   - D/w Dr. Mascorro and Dr. Nicolas Interventional Radiology    Evaluate for Procedure: Biliary drain placement.     HPI: 49-year-old male PMH metastatic colon CA with liver and lung metastases on panitumumab (last dose 7/10/24) who [place] at [facility] [place], [place]N, presents emergency department for right upper quadrant pain associated with fever Tmax 100.4 °F, chills, jaundice, dark urine for the past 2 weeks found to have elevated Tbili. Pt s/p ERCP on 7/15 demonstrating a patent stent. IR consulted for biliary drain placement.     Allergies: No Known Allergies    Medications (Abx/Cardiac/Anticoagulation/Blood Products)  enoxaparin Injectable: 40 milliGRAM(s) SubCutaneous (07-14 @ 05:10)  piperacillin/tazobactam IVPB..: 25 mL/Hr IV Intermittent (07-15 @ 13:28)    Data:    T(C): 36.5  HR: 74  BP: 115/63  RR: 18  SpO2: 100%    -WBC 5.29 / HgB 13.2 / Hct 38.9 / Plt 207  -Na -- / Cl -- / BUN -- / Glucose --  -K -- / CO2 -- / Cr --  -ALT 61 / Alk Phos 316 / T.Bili 9.3  -INR 1.22 / PTT 29.5         Assessment/Plan: 49-year-old male PMH metastatic colon CA with liver and lung metastases on panitumumab (last dose 7/10/24) who follows at [facility] [place], [place]N, presents emergency department for right upper quadrant pain associated with fever Tmax 100.4 °F, chills, jaundice, dark urine for the past 2 weeks found to have elevated Tbili. Pt s/p ERCP on 7/15 demonstrating a patent stent. IR consulted for biliary drain placement.      - Imaging to be reviewed with Dr. Jimenez in AM. Pt may need a Left biliary drain pending AM tbili levels.  - NPO past midnight.   - D/w Dr. Mascorro and Dr. Nicolas  - [facility]  - D/w Dr. reynoso

## 2024-07-15 NOTE — PROGRESS NOTE ADULT - SUBJECTIVE AND OBJECTIVE BOX
INTERVAL HPI/OVERNIGHT EVENTS:  Patient S&E at bedside. No o/n events,   planned for ercp today  bili 8.8 today   WBC 5.29, hb 13.2, plt 207  VSS on RA    PAST MEDICAL & SURGICAL HISTORY:  Colon cancer      Hypertension      History of biliary stent insertion      S/P hernia surgery      History of ablation of neoplasm of liver      S/P colon resection          FAMILY HISTORY:      VITAL SIGNS:  T(F): 98.4 (07-14-24 @ 22:28)  HR: 86 (07-14-24 @ 22:28)  BP: 109/80 (07-14-24 @ 22:28)  RR: 16 (07-14-24 @ 22:28)  SpO2: 99% (07-14-24 @ 22:28)  Wt(kg): --    PHYSICAL EXAM:    GENERAL: NAD,  EYES: EOMI, sclera icterus  CHEST/LUNG: Clear to auscultation bilaterally;   HEART: Regular rate and rhythm;   ABDOMEN: mild RuQ pain  EXTREMITIES: no edema  NEUROLOGY: non-focal    MEDICATIONS  (STANDING):  enoxaparin Injectable 40 milliGRAM(s) SubCutaneous every 24 hours  lactated ringers. 1000 milliLiter(s) (125 mL/Hr) IV Continuous <Continuous>  piperacillin/tazobactam IVPB.. 3.375 Gram(s) IV Intermittent every 8 hours  sodium chloride 0.9%. 1000 milliLiter(s) (125 mL/Hr) IV Continuous <Continuous>    MEDICATIONS  (PRN):  acetaminophen     Tablet .. 650 milliGRAM(s) Oral every 6 hours PRN Temp greater or equal to 38C (100.4F), Mild Pain (1 - 3)  aluminum hydroxide/magnesium hydroxide/simethicone Suspension 30 milliLiter(s) Oral every 4 hours PRN Dyspepsia  melatonin 3 milliGRAM(s) Oral at bedtime PRN Insomnia  ondansetron Injectable 4 milliGRAM(s) IV Push every 8 hours PRN Nausea and/or Vomiting      Allergies    No Known Allergies    Intolerances        LABS:                        13.2   5.29  )-----------( 207      ( 15 Jul 2024 06:40 )             38.9     07-15    139  |  108  |  8   ----------------------------<  106<H>  4.1   |  25  |  0.74    Ca    8.7      15 Jul 2024 06:40    TPro  6.9  /  Alb  2.5<L>  /  TBili  8.5<H>  /  DBili  6.9<H>  /  AST  122<H>  /  ALT  59  /  AlkPhos  308<H>  07-15      Urinalysis Basic - ( 15 Jul 2024 06:40 )    Color: x / Appearance: x / SG: x / pH: x  Gluc: 106 mg/dL / Ketone: x  / Bili: x / Urobili: x   Blood: x / Protein: x / Nitrite: x   Leuk Esterase: x / RBC: x / WBC x   Sq Epi: x / Non Sq Epi: x / Bacteria: x        RADIOLOGY & ADDITIONAL TESTS:  Studies reviewed.

## 2024-07-15 NOTE — PROGRESS NOTE ADULT - ASSESSMENT
49-year-old male PMH: Colon CA with liver and lung metastases on panitumumab (last dose 7/10), HTN, presents emergency department for right upper quadrant pain associated with fever Tmax 100.4 °F, chills, jaundice, dark urine for the past 2 weeks. No other complaints, and ROS otherwise benign.   In ED vitals stable, CBC/BMP unremarkable, LFTs noted for T. bili 8.2, D. bili 6.7, Alk phos 249, AST//74, UA sterile with bilirubin and ketones. COVID/Flu negative. US abdomen noted for hepatomegaly with abnormal appearance of the liver echotexture and echogenicity. Multiple liver lesions are seen. There is also a large masslike lesion near the pancreatic body. Evidence of intrahepatic and extrahepatic biliary duct dilatation. These findings are worrisome for an underlying neoplasm. Patient admitted to  for further evaluation.     # Obstructive jaundice   # Metastatic colon cancer to the liver s/p CBD stent with intra and extrahepatic ducts dilation    # Febrile syndrome possible cholangitis  # Transaminitis, hyperbilirubinemia due to above   - MRCP - GB mass, mild intra and extra hepatic biliary ducts dilation, CBD stent present , large liver mass , multiple liver lesions, lung nodules, bulky abdominal LAD , peritoneal implants  - GI consult appreciated - plan for ERCP   - Monitor LFTs  - Continue zosyn add probiotics  - FU cultures - neg x2   - Tylenol for fever  - For ERCP on monday, NPO    # Colon Ca with mets  - pt follows at [facility] w dr angeles  - currently on single agent panitumumab- started in April 2024- current     # DVT prophylaxis  - SCDS until procedure     Dispo - ERCP, IV abx, inpatient monitoring

## 2024-07-16 ENCOUNTER — TRANSCRIPTION ENCOUNTER (OUTPATIENT)
Age: 49
End: 2024-07-16

## 2024-07-16 LAB
24R-OH-CALCIDIOL SERPL-MCNC: 22.8 NG/ML — LOW (ref 30–80)
ALBUMIN SERPL ELPH-MCNC: 2.4 G/DL — LOW (ref 3.3–5)
ALBUMIN SERPL ELPH-MCNC: 2.4 G/DL — LOW (ref 3.3–5)
ALP SERPL-CCNC: 297 U/L — HIGH (ref 40–120)
ALP SERPL-CCNC: 302 U/L — HIGH (ref 40–120)
ALT FLD-CCNC: 57 U/L — SIGNIFICANT CHANGE UP (ref 12–78)
ALT FLD-CCNC: 58 U/L — SIGNIFICANT CHANGE UP (ref 12–78)
ANION GAP SERPL CALC-SCNC: 6 MMOL/L — SIGNIFICANT CHANGE UP (ref 5–17)
APTT BLD: 31.1 SEC — SIGNIFICANT CHANGE UP (ref 24.5–35.6)
AST SERPL-CCNC: 112 U/L — HIGH (ref 15–37)
AST SERPL-CCNC: 117 U/L — HIGH (ref 15–37)
BASOPHILS # BLD AUTO: 0.01 K/UL — SIGNIFICANT CHANGE UP (ref 0–0.2)
BASOPHILS NFR BLD AUTO: 0.2 % — SIGNIFICANT CHANGE UP (ref 0–2)
BILIRUB DIRECT SERPL-MCNC: 6.9 MG/DL — HIGH (ref 0–0.3)
BILIRUB INDIRECT FLD-MCNC: 1.7 MG/DL — HIGH (ref 0.2–1)
BILIRUB SERPL-MCNC: 8.2 MG/DL — HIGH (ref 0.2–1.2)
BILIRUB SERPL-MCNC: 8.6 MG/DL — HIGH (ref 0.2–1.2)
BUN SERPL-MCNC: 8 MG/DL — SIGNIFICANT CHANGE UP (ref 7–23)
CALCIUM SERPL-MCNC: 8.9 MG/DL — SIGNIFICANT CHANGE UP (ref 8.5–10.1)
CHLORIDE SERPL-SCNC: 109 MMOL/L — HIGH (ref 96–108)
CO2 SERPL-SCNC: 24 MMOL/L — SIGNIFICANT CHANGE UP (ref 22–31)
CREAT SERPL-MCNC: 0.58 MG/DL — SIGNIFICANT CHANGE UP (ref 0.5–1.3)
CRP SERPL-MCNC: 20 MG/L — HIGH
CULTURE RESULTS: SIGNIFICANT CHANGE UP
CULTURE RESULTS: SIGNIFICANT CHANGE UP
EGFR: 119 ML/MIN/1.73M2 — SIGNIFICANT CHANGE UP
EOSINOPHIL # BLD AUTO: 0.02 K/UL — SIGNIFICANT CHANGE UP (ref 0–0.5)
EOSINOPHIL NFR BLD AUTO: 0.3 % — SIGNIFICANT CHANGE UP (ref 0–6)
GLUCOSE SERPL-MCNC: 133 MG/DL — HIGH (ref 70–99)
HCT VFR BLD CALC: 36.8 % — LOW (ref 39–50)
HGB BLD-MCNC: 12.8 G/DL — LOW (ref 13–17)
IMM GRANULOCYTES NFR BLD AUTO: 0.3 % — SIGNIFICANT CHANGE UP (ref 0–0.9)
INR BLD: 1.25 RATIO — HIGH (ref 0.85–1.18)
LYMPHOCYTES # BLD AUTO: 1.15 K/UL — SIGNIFICANT CHANGE UP (ref 1–3.3)
LYMPHOCYTES # BLD AUTO: 20 % — SIGNIFICANT CHANGE UP (ref 13–44)
MAGNESIUM SERPL-MCNC: 1.4 MG/DL — LOW (ref 1.6–2.6)
MCHC RBC-ENTMCNC: 32.8 PG — SIGNIFICANT CHANGE UP (ref 27–34)
MCHC RBC-ENTMCNC: 34.8 GM/DL — SIGNIFICANT CHANGE UP (ref 32–36)
MCV RBC AUTO: 94.4 FL — SIGNIFICANT CHANGE UP (ref 80–100)
MONOCYTES # BLD AUTO: 0.34 K/UL — SIGNIFICANT CHANGE UP (ref 0–0.9)
MONOCYTES NFR BLD AUTO: 5.9 % — SIGNIFICANT CHANGE UP (ref 2–14)
NEUTROPHILS # BLD AUTO: 4.2 K/UL — SIGNIFICANT CHANGE UP (ref 1.8–7.4)
NEUTROPHILS NFR BLD AUTO: 73.3 % — SIGNIFICANT CHANGE UP (ref 43–77)
PHOSPHATE SERPL-MCNC: 3.2 MG/DL — SIGNIFICANT CHANGE UP (ref 2.5–4.5)
PLATELET # BLD AUTO: 205 K/UL — SIGNIFICANT CHANGE UP (ref 150–400)
POTASSIUM SERPL-MCNC: 3.8 MMOL/L — SIGNIFICANT CHANGE UP (ref 3.5–5.3)
POTASSIUM SERPL-SCNC: 3.8 MMOL/L — SIGNIFICANT CHANGE UP (ref 3.5–5.3)
PROCALCITONIN SERPL-MCNC: 0.12 NG/ML — HIGH (ref 0.02–0.1)
PROT SERPL-MCNC: 6.6 GM/DL — SIGNIFICANT CHANGE UP (ref 6–8.3)
PROT SERPL-MCNC: 6.7 GM/DL — SIGNIFICANT CHANGE UP (ref 6–8.3)
PROTHROM AB SERPL-ACNC: 14 SEC — HIGH (ref 9.5–13)
RBC # BLD: 3.9 M/UL — LOW (ref 4.2–5.8)
RBC # FLD: 12.6 % — SIGNIFICANT CHANGE UP (ref 10.3–14.5)
SODIUM SERPL-SCNC: 139 MMOL/L — SIGNIFICANT CHANGE UP (ref 135–145)
SPECIMEN SOURCE: SIGNIFICANT CHANGE UP
SPECIMEN SOURCE: SIGNIFICANT CHANGE UP
TSH SERPL-MCNC: 0.73 UU/ML — SIGNIFICANT CHANGE UP (ref 0.34–4.82)
WBC # BLD: 5.74 K/UL — SIGNIFICANT CHANGE UP (ref 3.8–10.5)
WBC # FLD AUTO: 5.74 K/UL — SIGNIFICANT CHANGE UP (ref 3.8–10.5)

## 2024-07-16 PROCEDURE — 99232 SBSQ HOSP IP/OBS MODERATE 35: CPT

## 2024-07-16 PROCEDURE — 47533 PLMT BILIARY DRAINAGE CATH: CPT

## 2024-07-16 RX ORDER — DEXTROSE MONOHYDRATE AND SODIUM CHLORIDE 5; .3 G/100ML; G/100ML
1000 INJECTION, SOLUTION INTRAVENOUS
Refills: 0 | Status: DISCONTINUED | OUTPATIENT
Start: 2024-07-16 | End: 2024-07-16

## 2024-07-16 RX ORDER — FENTANYL CITRATE 50 UG/ML
50 INJECTION, SOLUTION INTRAMUSCULAR; INTRAVENOUS
Refills: 0 | Status: DISCONTINUED | OUTPATIENT
Start: 2024-07-16 | End: 2024-07-16

## 2024-07-16 RX ORDER — ONDANSETRON HYDROCHLORIDE 2 MG/ML
4 INJECTION INTRAMUSCULAR; INTRAVENOUS ONCE
Refills: 0 | Status: DISCONTINUED | OUTPATIENT
Start: 2024-07-16 | End: 2024-07-16

## 2024-07-16 RX ORDER — DEXTROSE MONOHYDRATE, SODIUM CHLORIDE, AND POTASSIUM CHLORIDE 50; 4.5; 2.24 G/1000ML; G/1000ML; G/1000ML
1000 INJECTION, SOLUTION INTRAVENOUS
Refills: 0 | Status: DISCONTINUED | OUTPATIENT
Start: 2024-07-16 | End: 2024-07-18

## 2024-07-16 RX ORDER — MAGNESIUM SULFATE 100 %
1 POWDER (GRAM) MISCELLANEOUS EVERY 4 HOURS
Refills: 0 | Status: COMPLETED | OUTPATIENT
Start: 2024-07-16 | End: 2024-07-16

## 2024-07-16 RX ADMIN — PIPERACILLIN SODIUM AND TAZOBACTAM SODIUM 25 GRAM(S): 3; .375 INJECTION, POWDER, LYOPHILIZED, FOR SOLUTION INTRAVENOUS at 05:44

## 2024-07-16 RX ADMIN — PIPERACILLIN SODIUM AND TAZOBACTAM SODIUM 25 GRAM(S): 3; .375 INJECTION, POWDER, LYOPHILIZED, FOR SOLUTION INTRAVENOUS at 15:14

## 2024-07-16 RX ADMIN — Medication 3 MILLIGRAM(S): at 22:08

## 2024-07-16 RX ADMIN — PIPERACILLIN SODIUM AND TAZOBACTAM SODIUM 25 GRAM(S): 3; .375 INJECTION, POWDER, LYOPHILIZED, FOR SOLUTION INTRAVENOUS at 22:01

## 2024-07-16 RX ADMIN — FENTANYL CITRATE 50 MICROGRAM(S): 50 INJECTION, SOLUTION INTRAMUSCULAR; INTRAVENOUS at 18:32

## 2024-07-16 RX ADMIN — Medication 1 TABLET(S): at 09:37

## 2024-07-16 RX ADMIN — DEXTROSE MONOHYDRATE, SODIUM CHLORIDE, AND POTASSIUM CHLORIDE 75 MILLILITER(S): 50; 4.5; 2.24 INJECTION, SOLUTION INTRAVENOUS at 20:28

## 2024-07-16 RX ADMIN — Medication 100 GRAM(S): at 20:55

## 2024-07-16 RX ADMIN — Medication 100 GRAM(S): at 11:16

## 2024-07-16 RX ADMIN — Medication 125 MILLILITER(S): at 09:35

## 2024-07-16 NOTE — PRE PROCEDURE NOTE - PRE PROCEDURE EVALUATION
pt with mCRC and biliary obstruction. Left veronica dil. Hyper bilirubinemia. Presented with low grade fever and chills. ? cholangitic, better now on abx. Onc wants to change treatment and would like to optimize tbili to do so. No pruritis. Will place left external biliary drain and send cultures to a) treat potential cholangitis and b) optimize for palliative treatment. I explained it is possible drainage may not normalize bili given extensive hepatic tumor involvement, but he would like to proceed. Consent obtained.

## 2024-07-16 NOTE — PROGRESS NOTE ADULT - ASSESSMENT
49-year-old male PMH metastatic colon CA with liver and lung metastases on panitumumab (last dose 7/10/24) who follows at [facility], HTN, presents emergency department for right upper quadrant pain associated with fever Tmax 100.4 °F, chills, jaundice, dark urine for the past 2 weeks found to have bili 8.2     # metastatic colon ca  - pt follows at [facility] w dr angeles- - currently on single agent panitumumab- started in April 2024- current   - now w/ progression   - MRCP- Suspicion for solid soft tissue mass in the gallbladder. Mild intra and extra hepatic biliary ductal dilatation, common duct stent in place. Large centrally located liver mass could represent spread from the above-described gallbladder mass versus primary or metastatic neoplasm. Multiple additional liver lesions, lung nodules, and bulky abdominal  lymphadenopathy compatible with metastatic disease. Right upper quadrant peritoneal nodule, possibly a peritoneal implant.   - Seen by GI - s/p ERCP yesterday 7/15 with patent stent, significant right sided tumor burden.   - plan for IR evaluation for drain placement today   - bili 10 today     will follow daily and communicate with msrj  49-year-old male PMH metastatic colon CA with liver and lung metastases on panitumumab (last dose 7/10/24) who follows at [facility], HTN, presents emergency department for right upper quadrant pain associated with fever Tmax 100.4 °F, chills, jaundice, dark urine for the past 2 weeks found to have bili 8.2     # metastatic colon ca  - pt follows at [facility] w dr angeles- - currently on single agent panitumumab- started in April 2024- current   - now w/ progression   - MRCP- Suspicion for solid soft tissue mass in the gallbladder. Mild intra and extra hepatic biliary ductal dilatation, common duct stent in place. Large centrally located liver mass could represent spread from the above-described gallbladder mass versus primary or metastatic neoplasm. Multiple additional liver lesions, lung nodules, and bulky abdominal  lymphadenopathy compatible with metastatic disease. Right upper quadrant peritoneal nodule, possibly a peritoneal implant.   - Seen by GI - s/p ERCP yesterday 7/15 with patent stent, significant right sided tumor burden.   - plan for IR evaluation for drain placement today   - bili 8.2 today     will follow daily and communicate with m[facility]

## 2024-07-16 NOTE — PROGRESS NOTE ADULT - SUBJECTIVE AND OBJECTIVE BOX
HOSPITALIST ATTENDING PROGRESS NOTE    Chart and meds reviewed.  Patient seen and examined.    CC: Jaundiced    Subjective: No acute issues overnight. No pain. Tolerating po and abx.     All other systems reviewed and found to be negative with the exception of what has been described above.    Vital sings reviewed for last 24 h  T(C): 36.5 (07-16-24 @ 16:15), Max: 36.8 (07-16-24 @ 09:00)  T(F): 97.7 (07-16-24 @ 16:15), Max: 98.2 (07-16-24 @ 09:00)  HR: 86 (07-16-24 @ 16:15) (64 - 86)  BP: 114/79 (07-16-24 @ 16:15) (112/69 - 128/60)  RR: 17 (07-16-24 @ 16:15) (16 - 18)  SpO2: 97% (07-16-24 @ 16:15) (97% - 100%)        Physical exam :   HEENT:  pupils equal and reactive, EOMI, + jaundiced conjunctiva no oropharyngeal lesions, erythema, exudates, oral thrush,   NECK:   supple, no carotid bruits  CV:  +S1, +S2, regular, no murmurs  RESP:   lungs clear to auscultation bilaterally, no wheezing, rales, rhonchi, good air entry bilaterally  GI:  abdomen soft, non-tender, non-distended, normal BS  EXT:  no clubbing, no cyanosis, no edema, no calf pain, swelling or erythema  NEURO:  AAOX3, no focal neurological deficits, follows all commands, able to move extremities spontaneously  SKIN:  no ulcers, lesions or rashes    LABS: all reviewed                          12.8   5.74  )-----------( 205      ( 16 Jul 2024 07:35 )             36.8     07-16    139  |  109<H>  |  8   ----------------------------<  133<H>  3.8   |  24  |  0.58    Ca    8.9      16 Jul 2024 07:35  Phos  3.2     07-16  Mg     1.4     07-16    TPro  6.6  /  Alb  2.4<L>  /  TBili  8.6<H>  /  DBili  6.9<H>  /  AST  117<H>  /  ALT  58  /  AlkPhos  302<H>  07-16        LIVER FUNCTIONS - ( 16 Jul 2024 12:02 )  Alb: 2.4 g/dL / Pro: 6.6 gm/dL / ALK PHOS: 302 U/L / ALT: 58 U/L / AST: 117 U/L / GGT: x                                           13.2   5.29  )-----------( 207      ( 15 Jul 2024 06:40 )             38.9     07-15    139  |  108  |  8   ----------------------------<  106<H>  4.1   |  25  |  0.74    Ca    8.7      15 Jul 2024 06:40    TPro  7.0  /  Alb  2.5<L>  /  TBili  9.3<H>  /  DBili  7.5<H>  /  AST  124<H>  /  ALT  61  /  AlkPhos  316<H>  07-15        LIVER FUNCTIONS - ( 15 Jul 2024 11:46 )  Alb: 2.5 g/dL / Pro: 7.0 gm/dL / ALK PHOS: 316 U/L / ALT: 61 U/L / AST: 124 U/L / GGT: x           Culture - Blood (07.11.24 @ 16:17)    Specimen Source: .Blood None   Culture Results:   No growth at 72 Hours    Culture - Blood (07.11.24 @ 15:29)    Specimen Source: .Blood Blood-Peripheral   Culture Results:   No growth at 72 Hours         MR MRCP w/wo IV Cont (07.12.24 @ 11:45) >  IMPRESSION:    Suspicion for solid soft tissue mass in the gallbladder raising the   possibility of malignant malignancy.    Mild intra and extra hepatic biliary ductal dilatation, common duct stent   in place.    Large centrally located liver mass could represent spread from the   above-described gallbladder mass versus primary or metastatic neoplasm.    Multiple additional liver lesions, lung nodules, and bulky abdominal   lymphadenopathy compatible with metastatic disease.    Right upper quadrant peritoneal nodule, possibly a peritoneal implant.    12 Lead ECG (07.11.24 @ 16:53) >    Ventricular Rate 90 BPM  QTC Calculation(Bazett) 440 ms  Normal sinus rhythm  Incomplete right bundle branch block  Borderline ECG  No previous ECGs available        MEDICATIONS  (STANDING):  enoxaparin Injectable 40 milliGRAM(s) SubCutaneous every 24 hours  lactated ringers. 1000 milliLiter(s) (125 mL/Hr) IV Continuous <Continuous>  piperacillin/tazobactam IVPB.. 3.375 Gram(s) IV Intermittent every 8 hours  sodium chloride 0.9%. 1000 milliLiter(s) (125 mL/Hr) IV Continuous <Continuous>    MEDICATIONS  (PRN):  acetaminophen     Tablet .. 650 milliGRAM(s) Oral every 6 hours PRN Temp greater or equal to 38C (100.4F), Mild Pain (1 - 3)  aluminum hydroxide/magnesium hydroxide/simethicone Suspension 30 milliLiter(s) Oral every 4 hours PRN Dyspepsia  melatonin 3 milliGRAM(s) Oral at bedtime PRN Insomnia  ondansetron Injectable 4 milliGRAM(s) IV Push every 8 hours PRN Nausea and/or Vomiting

## 2024-07-16 NOTE — PROGRESS NOTE ADULT - SUBJECTIVE AND OBJECTIVE BOX
INTERVAL HPI/OVERNIGHT EVENTS:  Patient S&E at bedside.   s/p ERCP yesterday with patent stent, significant right sided tumor burden.   plan for IR eval for drain placement to get bili lower   bili 10 today   afebrile overnight, feels well, no pain     PAST MEDICAL & SURGICAL HISTORY:  Colon cancer      Hypertension      History of biliary stent insertion      S/P hernia surgery      History of ablation of neoplasm of liver      S/P colon resection          FAMILY HISTORY:      VITAL SIGNS:  T(F): 97.8 (07-15-24 @ 20:27)  HR: 76 (07-15-24 @ 20:27)  BP: 128/60 (07-15-24 @ 20:27)  RR: 18 (07-15-24 @ 20:27)  SpO2: 100% (07-15-24 @ 20:27)  Wt(kg): --    PHYSICAL EXAM:    Constitutional: NAD  Eyes: EOMI, sclera-icteric  Respiratory: CTA b/l, good air entry b/l  Cardiovascular: RRR,   Gastrointestinal: soft, NTND,  Extremities: no c/c/e      MEDICATIONS  (STANDING):  lactobacillus acidophilus 1 Tablet(s) Oral daily  piperacillin/tazobactam IVPB.. 3.375 Gram(s) IV Intermittent every 8 hours  sodium chloride 0.9%. 1000 milliLiter(s) (125 mL/Hr) IV Continuous <Continuous>    MEDICATIONS  (PRN):  acetaminophen     Tablet .. 650 milliGRAM(s) Oral every 6 hours PRN Temp greater or equal to 38C (100.4F), Mild Pain (1 - 3)  aluminum hydroxide/magnesium hydroxide/simethicone Suspension 30 milliLiter(s) Oral every 4 hours PRN Dyspepsia  melatonin 3 milliGRAM(s) Oral at bedtime PRN Insomnia  ondansetron Injectable 4 milliGRAM(s) IV Push every 8 hours PRN Nausea and/or Vomiting      Allergies    No Known Allergies    Intolerances        LABS:                        12.8   5.74  )-----------( 205      ( 16 Jul 2024 07:35 )             36.8     07-15    139  |  108  |  8   ----------------------------<  106<H>  4.1   |  25  |  0.74    Ca    8.7      15 Jul 2024 06:40    TPro  7.2  /  Alb  2.5<L>  /  TBili  10.0<H>  /  DBili  8.5<H>  /  AST  122<H>  /  ALT  58  /  AlkPhos  329<H>  07-15    PT/INR - ( 16 Jul 2024 07:35 )   PT: 14.0 sec;   INR: 1.25 ratio         PTT - ( 16 Jul 2024 07:35 )  PTT:31.1 sec  Urinalysis Basic - ( 15 Jul 2024 06:40 )    Color: x / Appearance: x / SG: x / pH: x  Gluc: 106 mg/dL / Ketone: x  / Bili: x / Urobili: x   Blood: x / Protein: x / Nitrite: x   Leuk Esterase: x / RBC: x / WBC x   Sq Epi: x / Non Sq Epi: x / Bacteria: x        RADIOLOGY & ADDITIONAL TESTS:  Studies reviewed.   INTERVAL HPI/OVERNIGHT EVENTS:  Patient S&E at bedside.   s/p ERCP yesterday with patent stent, significant right sided tumor burden.   plan for IR eval for drain placement to get bili lower   bili 10-->8.2 today   afebrile overnight, feels well, no pain     PAST MEDICAL & SURGICAL HISTORY:  Colon cancer      Hypertension      History of biliary stent insertion      S/P hernia surgery      History of ablation of neoplasm of liver      S/P colon resection          FAMILY HISTORY:      VITAL SIGNS:  T(F): 97.8 (07-15-24 @ 20:27)  HR: 76 (07-15-24 @ 20:27)  BP: 128/60 (07-15-24 @ 20:27)  RR: 18 (07-15-24 @ 20:27)  SpO2: 100% (07-15-24 @ 20:27)  Wt(kg): --    PHYSICAL EXAM:    Constitutional: NAD  Eyes: EOMI, sclera-icteric  Respiratory: CTA b/l, good air entry b/l  Cardiovascular: RRR,   Gastrointestinal: soft, NTND,  Extremities: no c/c/e      MEDICATIONS  (STANDING):  lactobacillus acidophilus 1 Tablet(s) Oral daily  piperacillin/tazobactam IVPB.. 3.375 Gram(s) IV Intermittent every 8 hours  sodium chloride 0.9%. 1000 milliLiter(s) (125 mL/Hr) IV Continuous <Continuous>    MEDICATIONS  (PRN):  acetaminophen     Tablet .. 650 milliGRAM(s) Oral every 6 hours PRN Temp greater or equal to 38C (100.4F), Mild Pain (1 - 3)  aluminum hydroxide/magnesium hydroxide/simethicone Suspension 30 milliLiter(s) Oral every 4 hours PRN Dyspepsia  melatonin 3 milliGRAM(s) Oral at bedtime PRN Insomnia  ondansetron Injectable 4 milliGRAM(s) IV Push every 8 hours PRN Nausea and/or Vomiting      Allergies    No Known Allergies    Intolerances        LABS:                        12.8   5.74  )-----------( 205 ( 16 Jul 2024 07:35 )             36.8     07-15    139  |  108  |  8   ----------------------------<  106<H>  4.1   |  25  |  0.74    Ca    8.7      15 Jul 2024 06:40    TPro  7.2  /  Alb  2.5<L>  /  TBili  10.0<H>  /  DBili  8.5<H>  /  AST  122<H>  /  ALT  58  /  AlkPhos  329<H>  07-15    PT/INR - ( 16 Jul 2024 07:35 )   PT: 14.0 sec;   INR: 1.25 ratio         PTT - ( 16 Jul 2024 07:35 )  PTT:31.1 sec  Urinalysis Basic - ( 15 Jul 2024 06:40 )    Color: x / Appearance: x / SG: x / pH: x  Gluc: 106 mg/dL / Ketone: x  / Bili: x / Urobili: x   Blood: x / Protein: x / Nitrite: x   Leuk Esterase: x / RBC: x / WBC x   Sq Epi: x / Non Sq Epi: x / Bacteria: x        RADIOLOGY & ADDITIONAL TESTS:  Studies reviewed.

## 2024-07-16 NOTE — DISCHARGE NOTE NURSING/CASE MANAGEMENT/SOCIAL WORK - PATIENT PORTAL LINK FT
You can access the FollowMyHealth Patient Portal offered by [facility] by registering at the following website: [email or web address]. By joining [facility]’s FollowMyHealth portal, you will also be able to view your health information using other applications (apps) compatible with our system.

## 2024-07-16 NOTE — PROGRESS NOTE ADULT - ASSESSMENT
49-year-old male PMH: Colon CA with liver and lung metastases on panitumumab (last dose 7/10), HTN, presents emergency department for right upper quadrant pain associated with fever Tmax 100.4 °F, chills, jaundice, dark urine for the past 2 weeks. No other complaints, and ROS otherwise benign.   In ED vitals stable, CBC/BMP unremarkable, LFTs noted for T. bili 8.2, D. bili 6.7, Alk phos 249, AST//74, UA sterile with bilirubin and ketones. COVID/Flu negative. US abdomen noted for hepatomegaly with abnormal appearance of the liver echotexture and echogenicity. Multiple liver lesions are seen. There is also a large masslike lesion near the pancreatic body. Evidence of intrahepatic and extrahepatic biliary duct dilatation. These findings are worrisome for an underlying neoplasm. Patient admitted to  for further evaluation.     # Obstructive jaundice   # Metastatic colon cancer to the liver s/p CBD stent with intra and extrahepatic ducts dilation    # Febrile syndrome possible cholangitis  # Transaminitis, hyperbilirubinemia due to above   - MRCP - GB mass, mild intra and extra hepatic biliary ducts dilation, CBD stent present , large liver mass , multiple liver lesions, lung nodules, bulky abdominal LAD , peritoneal implants  - GI consult appreciated   - 7/15 s/p  ERCP   - 7/16 - plan for biliary drain  - Monitor LFTs  - Continue zosyn add probiotics  - FU cultures - neg x2   - Tylenol for fever      # Colon Ca with mets  - pt follows at [facility] w dr angeles  - currently on single agent panitumumab- started in April 2024- current     # DVT prophylaxis  - SCDS until procedure     Dispo - IV fluids IV abx, inpatient monitoring

## 2024-07-16 NOTE — PROCEDURE NOTE - PROCEDURE FINDINGS AND DETAILS
dilated left intrahepatic biliary tree with central near complete obstruction by mass. CBD stent patent. 8.5 fr left external biliary drain placed. draining blood tinged bile. specimen sent for culture.

## 2024-07-17 LAB
24R-OH-CALCIDIOL SERPL-MCNC: 22.4 NG/ML — LOW (ref 30–80)
ALBUMIN SERPL ELPH-MCNC: 2.5 G/DL — LOW (ref 3.3–5)
ALP SERPL-CCNC: 339 U/L — HIGH (ref 40–120)
ALT FLD-CCNC: 66 U/L — SIGNIFICANT CHANGE UP (ref 12–78)
AMMONIA BLD-MCNC: 29 UMOL/L — SIGNIFICANT CHANGE UP (ref 11–32)
AMYLASE P1 CFR SERPL: 44 U/L — SIGNIFICANT CHANGE UP (ref 25–115)
ANION GAP SERPL CALC-SCNC: 8 MMOL/L — SIGNIFICANT CHANGE UP (ref 5–17)
AST SERPL-CCNC: 132 U/L — HIGH (ref 15–37)
BILIRUB DIRECT SERPL-MCNC: 7.2 MG/DL — HIGH (ref 0–0.3)
BILIRUB INDIRECT FLD-MCNC: 1.5 MG/DL — HIGH (ref 0.2–1)
BILIRUB SERPL-MCNC: 8.7 MG/DL — HIGH (ref 0.2–1.2)
BUN SERPL-MCNC: 7 MG/DL — SIGNIFICANT CHANGE UP (ref 7–23)
CALCIUM SERPL-MCNC: 8.9 MG/DL — SIGNIFICANT CHANGE UP (ref 8.5–10.1)
CHLORIDE SERPL-SCNC: 108 MMOL/L — SIGNIFICANT CHANGE UP (ref 96–108)
CO2 SERPL-SCNC: 24 MMOL/L — SIGNIFICANT CHANGE UP (ref 22–31)
CREAT SERPL-MCNC: 0.55 MG/DL — SIGNIFICANT CHANGE UP (ref 0.5–1.3)
CRP SERPL-MCNC: 16 MG/L — HIGH
EGFR: 121 ML/MIN/1.73M2 — SIGNIFICANT CHANGE UP
GLUCOSE SERPL-MCNC: 105 MG/DL — HIGH (ref 70–99)
HAV IGM SER-ACNC: SIGNIFICANT CHANGE UP
HBV CORE IGM SER-ACNC: SIGNIFICANT CHANGE UP
HBV SURFACE AG SER-ACNC: SIGNIFICANT CHANGE UP
HCT VFR BLD CALC: 37.7 % — LOW (ref 39–50)
HCV AB S/CO SERPL IA: 10.52 S/CO — HIGH (ref 0–0.99)
HCV AB SERPL-IMP: REACTIVE
HGB BLD-MCNC: 12.8 G/DL — LOW (ref 13–17)
LIDOCAIN IGE QN: 47 U/L — SIGNIFICANT CHANGE UP (ref 13–75)
MAGNESIUM SERPL-MCNC: 1.6 MG/DL — SIGNIFICANT CHANGE UP (ref 1.6–2.6)
MCHC RBC-ENTMCNC: 32.2 PG — SIGNIFICANT CHANGE UP (ref 27–34)
MCHC RBC-ENTMCNC: 34 GM/DL — SIGNIFICANT CHANGE UP (ref 32–36)
MCV RBC AUTO: 94.7 FL — SIGNIFICANT CHANGE UP (ref 80–100)
NIGHT BLUE STAIN TISS: SIGNIFICANT CHANGE UP
PHOSPHATE SERPL-MCNC: 2.8 MG/DL — SIGNIFICANT CHANGE UP (ref 2.5–4.5)
PLATELET # BLD AUTO: 214 K/UL — SIGNIFICANT CHANGE UP (ref 150–400)
POTASSIUM SERPL-MCNC: 3.7 MMOL/L — SIGNIFICANT CHANGE UP (ref 3.5–5.3)
POTASSIUM SERPL-SCNC: 3.7 MMOL/L — SIGNIFICANT CHANGE UP (ref 3.5–5.3)
PROT SERPL-MCNC: 6.9 GM/DL — SIGNIFICANT CHANGE UP (ref 6–8.3)
RBC # BLD: 3.98 M/UL — LOW (ref 4.2–5.8)
RBC # FLD: 13.2 % — SIGNIFICANT CHANGE UP (ref 10.3–14.5)
SODIUM SERPL-SCNC: 140 MMOL/L — SIGNIFICANT CHANGE UP (ref 135–145)
SPECIMEN SOURCE: SIGNIFICANT CHANGE UP
WBC # BLD: 6.3 K/UL — SIGNIFICANT CHANGE UP (ref 3.8–10.5)
WBC # FLD AUTO: 6.3 K/UL — SIGNIFICANT CHANGE UP (ref 3.8–10.5)

## 2024-07-17 PROCEDURE — 99232 SBSQ HOSP IP/OBS MODERATE 35: CPT

## 2024-07-17 PROCEDURE — 99231 SBSQ HOSP IP/OBS SF/LOW 25: CPT

## 2024-07-17 RX ADMIN — PIPERACILLIN SODIUM AND TAZOBACTAM SODIUM 25 GRAM(S): 3; .375 INJECTION, POWDER, LYOPHILIZED, FOR SOLUTION INTRAVENOUS at 21:06

## 2024-07-17 RX ADMIN — Medication 3 MILLIGRAM(S): at 21:06

## 2024-07-17 RX ADMIN — DEXTROSE MONOHYDRATE, SODIUM CHLORIDE, AND POTASSIUM CHLORIDE 75 MILLILITER(S): 50; 4.5; 2.24 INJECTION, SOLUTION INTRAVENOUS at 22:05

## 2024-07-17 RX ADMIN — DEXTROSE MONOHYDRATE, SODIUM CHLORIDE, AND POTASSIUM CHLORIDE 75 MILLILITER(S): 50; 4.5; 2.24 INJECTION, SOLUTION INTRAVENOUS at 09:52

## 2024-07-17 RX ADMIN — PIPERACILLIN SODIUM AND TAZOBACTAM SODIUM 25 GRAM(S): 3; .375 INJECTION, POWDER, LYOPHILIZED, FOR SOLUTION INTRAVENOUS at 13:53

## 2024-07-17 RX ADMIN — Medication 1 TABLET(S): at 09:49

## 2024-07-17 RX ADMIN — PIPERACILLIN SODIUM AND TAZOBACTAM SODIUM 25 GRAM(S): 3; .375 INJECTION, POWDER, LYOPHILIZED, FOR SOLUTION INTRAVENOUS at 05:13

## 2024-07-17 RX ADMIN — Medication 2000 UNIT(S): at 09:52

## 2024-07-17 NOTE — PROGRESS NOTE ADULT - ASSESSMENT
49-year-old male PMH metastatic colon CA with liver and lung metastases on panitumumab (last dose 7/10/24) who follows at [facility], HTN, presents emergency department for right upper quadrant pain associated with fever Tmax 100.4 °F, chills, jaundice, dark urine for the past 2 weeks found to have bili 8.2     # metastatic colon ca  - pt follows at [facility] w dr angeles- - currently on single agent panitumumab- started in April 2024- current   - imaging consistent with local progression   - MRCP- Suspicion for solid soft tissue mass in the gallbladder. Mild intra and extra hepatic biliary ductal dilatation, common duct stent in place. Large centrally located liver mass could represent spread from the above-described gallbladder mass versus primary or metastatic neoplasm. Multiple additional liver lesions, lung nodules, and bulky abdominal  lymphadenopathy compatible with metastatic disease. Right upper quadrant peritoneal nodule, possibly a peritoneal implant.   - Seen by GI - s/p ERCP yesterday 7/15 with patent stent, significant right sided tumor burden.   - s/p placement of 8.5 F drainage catheter.   - bili 8.7 - will continue to trend   - will need to follow up [facility] on discharge

## 2024-07-17 NOTE — PROGRESS NOTE ADULT - SUBJECTIVE AND OBJECTIVE BOX
INTERVAL HPI/OVERNIGHT EVENTS:    Patient S&E at bedside. No o/n events, patient resting comfortably.    VITAL SIGNS:  T(F): 97.2 (07-17-24 @ 15:43)  HR: 87 (07-17-24 @ 15:43)  BP: 124/90 (07-17-24 @ 15:43)  RR: 18 (07-17-24 @ 15:43)  SpO2: 98% (07-17-24 @ 15:43)      PHYSICAL EXAM:    Constitutional: NAD  Eyes: EOMI, sclera non-icteric  Neck: supple, no masses, no JVD  Respiratory: CTA b/l, good air entry b/l  Cardiovascular: RRR, no M/R/G  Gastrointestinal: soft, now with abdominal drain.   Extremities: no c/c/e  Neurological: AAOx3      MEDICATIONS  (STANDING):  cholecalciferol 2000 Unit(s) Oral daily  lactobacillus acidophilus 1 Tablet(s) Oral daily  piperacillin/tazobactam IVPB.. 3.375 Gram(s) IV Intermittent every 8 hours  sodium chloride 0.9% with potassium chloride 20 mEq/L 1000 milliLiter(s) (75 mL/Hr) IV Continuous <Continuous>    MEDICATIONS  (PRN):  acetaminophen     Tablet .. 650 milliGRAM(s) Oral every 6 hours PRN Temp greater or equal to 38C (100.4F), Mild Pain (1 - 3)  aluminum hydroxide/magnesium hydroxide/simethicone Suspension 30 milliLiter(s) Oral every 4 hours PRN Dyspepsia  melatonin 3 milliGRAM(s) Oral at bedtime PRN Insomnia  ondansetron Injectable 4 milliGRAM(s) IV Push every 8 hours PRN Nausea and/or Vomiting      Allergies    No Known Allergies    Intolerances        LABS:                        12.8   6.30  )-----------( 214      ( 17 Jul 2024 07:41 )             37.7     07-17    140  |  108  |  7   ----------------------------<  105<H>  3.7   |  24  |  0.55    Ca    8.9      17 Jul 2024 07:41  Phos  2.8     07-17  Mg     1.6     07-17    TPro  6.9  /  Alb  2.5<L>  /  TBili  8.7<H>  /  DBili  7.2<H>  /  AST  132<H>  /  ALT  66  /  AlkPhos  339<H>  07-17    PT/INR - ( 16 Jul 2024 07:35 )   PT: 14.0 sec;   INR: 1.25 ratio         PTT - ( 16 Jul 2024 07:35 )  PTT:31.1 sec  Urinalysis Basic - ( 17 Jul 2024 07:41 )    Color: x / Appearance: x / SG: x / pH: x  Gluc: 105 mg/dL / Ketone: x  / Bili: x / Urobili: x   Blood: x / Protein: x / Nitrite: x   Leuk Esterase: x / RBC: x / WBC x   Sq Epi: x / Non Sq Epi: x / Bacteria: x        RADIOLOGY & ADDITIONAL TESTS:  Studies reviewed.    ASSESSMENT & PLAN:

## 2024-07-17 NOTE — PROGRESS NOTE ADULT - SUBJECTIVE AND OBJECTIVE BOX
49-year-old male PMH: CA with liver and lung metastases on panitumumab (last dose 7/10), HTN, presents emergency department for right upper quadrant pain associated with fever Tmax 100.4 °F, chills, jaundice. Pt now s/p ERCP which noted patent stent and possible occluded left sided ducts. Pt now s/p placement of left biliary drain with IR. Seen at bedside, feeling well with no complaints.     Vital Signs Last 24 Hrs  T(C): 36.5 (17 Jul 2024 09:36), Max: 36.6 (17 Jul 2024 01:08)  T(F): 97.7 (17 Jul 2024 09:36), Max: 97.8 (17 Jul 2024 01:08)  HR: 75 (17 Jul 2024 09:36) (68 - 86)  BP: 103/69 (17 Jul 2024 09:36) (103/69 - 120/89)  BP(mean): --  RR: 17 (17 Jul 2024 09:36) (11 - 18)  SpO2: 99% (17 Jul 2024 09:36) (96% - 99%)    Parameters below as of 17 Jul 2024 09:36  Patient On (Oxygen Delivery Method): room air    CBC                        12.8   6.30  )-----------( 214      ( 17 Jul 2024 07:41 )             37.7       Chemistry  07-17    140  |  108  |  7   ----------------------------<  105<H>  3.7   |  24  |  0.55    Ca    8.9      17 Jul 2024 07:41  Phos  2.8     07-17  Mg     1.6     07-17    TPro  6.9  /  Alb  2.5<L>  /  TBili  8.7<H>  /  DBili  7.2<H>  /  AST  132<H>  /  ALT  66  /  AlkPhos  339<H>  07-17    Coags  PT/INR - ( 16 Jul 2024 07:35 )   PT: 14.0 sec;   INR: 1.25 ratio    PTT - ( 16 Jul 2024 07:35 )  PTT:31.1 sec

## 2024-07-17 NOTE — PROGRESS NOTE ADULT - ASSESSMENT
49-year-old male PMH: Colon CA with liver and lung metastases on panitumumab (last dose 7/10), HTN, presents emergency department for right upper quadrant pain associated with fever Tmax 100.4 °F, chills, jaundice, dark urine for the past 2 weeks. No other complaints, and ROS otherwise benign.   In ED vitals stable, CBC/BMP unremarkable, LFTs noted for T. bili 8.2, D. bili 6.7, Alk phos 249, AST//74, UA sterile with bilirubin and ketones. COVID/Flu negative. US abdomen noted for hepatomegaly with abnormal appearance of the liver echotexture and echogenicity. Multiple liver lesions are seen. There is also a large masslike lesion near the pancreatic body. Evidence of intrahepatic and extrahepatic biliary duct dilatation. These findings are worrisome for an underlying neoplasm. Patient admitted to  for further evaluation.     # Obstructive jaundice   # Metastatic colon cancer to the liver s/p CBD stent with intra and extrahepatic ducts dilation  s/p biliary drain placement 7/16   # Febrile syndrome possible cholangitis  # Transaminitis, hyperbilirubinemia due to above   - MRCP - GB mass, mild intra and extra hepatic biliary ducts dilation, CBD stent present , large liver mass , multiple liver lesions, lung nodules, bulky abdominal LAD , peritoneal implants  - GI consult appreciated   - 7/15 s/p  ERCP   - 7/16 - s/p biliary drain - output in 24 h - 50 cc , flush the drain daily with 10 cc NS  - Monitor LFTs  - Continue zosyn add probiotics, CRP trending down 20--> 16   - ID consult   - FU cultures - neg x2   - Tylenol for fever     # Colon Ca with mets  - [place] [facility] [place]  - currently on single agent panitumumab- started in April 2024- current     # Vitamin D deficiency   - replace daily     # DVT prophylaxis  - SCDS until procedure     Dispo - IV fluids IV abx, inpatient monitoring

## 2024-07-17 NOTE — CONSULT NOTE ADULT - ASSESSMENT
49-year-old male PMH: CA with liver and lung metastases on panitumumab (last dose 7/10), HTN, presents emergency department for right upper quadrant pain associated with fever Tmax 100.4 °F, chills, jaundice, dark urine for the past 2 weeks. No other complaints, and ROS otherwise benign.     1. Fever. Acute cholangitis. Metastatic colon cancer. Immunocompromised host   - imaging reviewed  - s/p biliary drain placement 7/16  - fu cultures - blood cx no growth  - on zosyn [id number]  - continue with abx coverage  - monitor temps  - tolerating abx well so far; no side effects noted  - reason for abx use and side effects reviewed with patient  - supportive care  - fu cbc    Clinical team may change from intravenous to oral antibiotics when the following criteria are met:   1. Patient is clinically improving/stable       a)	Improved signs and symptoms of infection from initial presentation       b)	Afebrile for 24 hours       c)	Leukocytosis trending towards normal range   2. Patient is tolerating oral intake   3. Initial/repeat blood cultures are negative     When above criteria met, may change iv antibiotics to: po augmentin [id number] x 14 days total

## 2024-07-17 NOTE — PROGRESS NOTE ADULT - SUBJECTIVE AND OBJECTIVE BOX
HOSPITALIST ATTENDING PROGRESS NOTE    Chart and meds reviewed.  Patient seen and examined.    CC: Jaundiced    7/17 - no abdominal pain, drain draining blood tinged bile,  No acute issues overnight. Tolerating po and abx.     All other systems reviewed and found to be negative with the exception of what has been described above.    Vital sings reviewed for last 24 h  T(C): 36.2 (07-17-24 @ 15:43), Max: 36.6 (07-17-24 @ 01:08)  T(F): 97.2 (07-17-24 @ 15:43), Max: 97.8 (07-17-24 @ 01:08)  HR: 87 (07-17-24 @ 15:43) (68 - 87)  BP: 124/90 (07-17-24 @ 15:43) (103/69 - 124/90)  RR: 18 (07-17-24 @ 15:43) (11 - 18)  SpO2: 98% (07-17-24 @ 15:43) (96% - 99%)    Physical exam :   HEENT:  pupils equal and reactive, EOMI, + jaundiced conjunctiva no oropharyngeal lesions, erythema, exudates, oral thrush,   NECK:   supple, no carotid bruits  CV:  +S1, +S2, regular, no murmurs  RESP:   lungs clear to auscultation bilaterally, no wheezing, rales, rhonchi, good air entry bilaterally  GI:  abdomen soft, non-tender, non-distended, normal BS  EXT:  no clubbing, no cyanosis, no edema, no calf pain, swelling or erythema  NEURO:  AAOX3, no focal neurological deficits, follows all commands, able to move extremities spontaneously  SKIN:  no ulcers, lesions or rashes    LABS: all reviewed                          12.8   6.30  )-----------( 214      ( 17 Jul 2024 07:41 )             37.7     07-17    140  |  108  |  7   ----------------------------<  105<H>  3.7   |  24  |  0.55    Ca    8.9      17 Jul 2024 07:41  Phos  2.8     07-17  Mg     1.6     07-17    TPro  6.9  /  Alb  2.5<L>  /  TBili  8.7<H>  /  DBili  7.2<H>  /  AST  132<H>  /  ALT  66  /  AlkPhos  339<H>  07-17        LIVER FUNCTIONS - ( 17 Jul 2024 07:41 )  Alb: 2.5 g/dL / Pro: 6.9 gm/dL / ALK PHOS: 339 U/L / ALT: 66 U/L / AST: 132 U/L / GGT: x                 LIVER FUNCTIONS - ( 15 Jul 2024 11:46 )  Alb: 2.5 g/dL / Pro: 7.0 gm/dL / ALK PHOS: 316 U/L / ALT: 61 U/L / AST: 124 U/L / GGT: x           Culture - Blood (07.11.24 @ 16:17)    Specimen Source: .Blood None   Culture Results:   No growth at 72 Hours    Culture - Blood (07.11.24 @ 15:29)    Specimen Source: .Blood Blood-Peripheral   Culture Results:   No growth at 72 Hours         MR MRCP w/wo IV Cont (07.12.24 @ 11:45) >  IMPRESSION:    Suspicion for solid soft tissue mass in the gallbladder raising the   possibility of malignant malignancy.    Mild intra and extra hepatic biliary ductal dilatation, common duct stent   in place.    Large centrally located liver mass could represent spread from the   above-described gallbladder mass versus primary or metastatic neoplasm.    Multiple additional liver lesions, lung nodules, and bulky abdominal   lymphadenopathy compatible with metastatic disease.    Right upper quadrant peritoneal nodule, possibly a peritoneal implant.    12 Lead ECG (07.11.24 @ 16:53) >    Ventricular Rate 90 BPM  QTC Calculation(Bazett) 440 ms  Normal sinus rhythm  Incomplete right bundle branch block  Borderline ECG  No previous ECGs available        MEDICATIONS  (STANDING):  enoxaparin Injectable 40 milliGRAM(s) SubCutaneous every 24 hours  lactated ringers. 1000 milliLiter(s) (125 mL/Hr) IV Continuous <Continuous>  piperacillin/tazobactam IVPB.. 3.375 Gram(s) IV Intermittent every 8 hours  sodium chloride 0.9%. 1000 milliLiter(s) (125 mL/Hr) IV Continuous <Continuous>    MEDICATIONS  (PRN):  acetaminophen     Tablet .. 650 milliGRAM(s) Oral every 6 hours PRN Temp greater or equal to 38C (100.4F), Mild Pain (1 - 3)  aluminum hydroxide/magnesium hydroxide/simethicone Suspension 30 milliLiter(s) Oral every 4 hours PRN Dyspepsia  melatonin 3 milliGRAM(s) Oral at bedtime PRN Insomnia  ondansetron Injectable 4 milliGRAM(s) IV Push every 8 hours PRN Nausea and/or Vomiting

## 2024-07-17 NOTE — CONSULT NOTE ADULT - SUBJECTIVE AND OBJECTIVE BOX
GI consult    HPI:  49-year-old male PMH: Colon cancer dx 2020 with liver and lung metastases on panitumumab (last dose 7/10), HTN, presents emergency department for right upper quadrant pain associated with fever Tmax 100.4 °F, chills, jaundice, dark urine for the past 2 weeks. No other complaints, and ROS otherwise benign.   In ED vitals stable, CBC/BMP unremarkable, LFTs noted for T. bili 8.2, D. bili 6.7, Alk phos 249, AST//74, UA sterile with bilirubin and ketones. COVID/Flu negative. US abdomen noted for hepatomegaly with abnormal appearance of the liver echotexture and echogenicity. Multiple liver lesions are seen. There is also a large masslike lesion near the pancreatic body. Evidence of intrahepatic and extrahepatic biliary duct dilatation. These findings are worrisome for an underlying neoplasm. Patient admitted to  for further evaluation.    Pt with 2 weeks of on and off fever, and RUQ pain. Had biliary stents placed Jan 2024 at [facility] [place] for obstruction from tumor. No n/v. Started abx on admission with resolution of fever. MRCP reported as below:    ACC: [id number] EXAM:  MR MRCP [facility]   ORDERED BY: AMARILIS MUKHERJEE     PROCEDURE DATE:  07/12/2024          INTERPRETATION:  CLINICAL INFORMATION: Right upper quadrant pain and   fever; rule out cholangitis or obstruction.    COMPARISON: CT February 20, 2023, ultrasound July 11, 2024    CONTRAST/COMPLICATIONS:  IV Contrast: Gadavist  10 cc administered   0 cc discarded  Oral Contrast: NONE  Complications: None reported at time of study completion    PROCEDURE:  MRI of the abdomen was performed.  MRCP was performed.    FINDINGS:  LOWER CHEST: Multiple bilateral pulmonary nodules measuring up to 9 mm in   the left lung.    LIVER: Large arterial phase enhancing mass involving segments 4A4B,, 5   and 7 and measuring 12.5 x 8.4 cm. The mass compresses the right and left   main ducts and the proximal common duct. Multiple additional bilobar   liver lesions, for example a segment 7 lesion measuring 2.6 cm (23; 22).  BILE DUCTS: Mild intrahepatic biliary ductal dilatation, left side   greater than right. Narrowing of both the right and left main ducts by   the above-described large centrally located solid intrahepatic mass.The   distal common duct is normal in caliber. Common duct stents in place,   extending into the right biliary tree.  GALLBLADDER: Cholelithiasis. Suspicion for solid soft tissue mass in the   mid gallbladder  SPLEEN: Enlarged, measuring 14.7 cm.  PANCREAS: Within normal limits.  ADRENALS: Within normal limits.  KIDNEYS/URETERS: Within normal limits.    VISUALIZED PORTIONS:  BOWEL: Within normal limits. Normal appendix.  PERITONEUM: Small amount of perihepatic ascites. 1.1 cm perihepatic soft   tissue nodule (23; 47).  VESSELS: Portal and hepatic veins are patent. Middle hepatic vein not   well visualized due to surrounding tumor.  RETROPERITONEUM/LYMPH NODES: Bulky retrocrural, gastrohepatic ligament,   clinton hepatis, peripancreatic and retroperitoneal lymphadenopathy, for   example:  4.2 x 3.2 cm clinton hepatis node (22; 44)  4.4 x 3.8 cm gastrohepatic ligament lymph node (22; 36).  ABDOMINAL WALL: Moderate-sized fat-containing periumbilical hernia.  BONES: L3 vertebral body hemangioma.    IMPRESSION:    Suspicion for solid soft tissue mass in the gallbladder raising the   possibility of malignant malignancy.    Mild intra and extra hepatic biliary ductal dilatation, common duct stent   in place.    Large centrally located liver mass could represent spread from the   above-described gallbladder mass versus primary or metastatic neoplasm.    Multiple additional liver lesions, lung nodules, and bulky abdominal   lymphadenopathy compatible with metastatic disease.    Right upper quadrant peritoneal nodule, possibly a peritoneal implant.          PAST MEDICAL & SURGICAL HISTORY:  Colon cancer      Hypertension      History of biliary stent insertion      S/P hernia surgery      History of ablation of neoplasm of liver      S/P colon resection          Home Medications:  Azithromycin 5 Day Dose Pack 250 mg oral tablet: Take as directed, Zpak 5 Day Instructions: Take 2 tablets by mouth on day 1, then 1 tablet daily on days 2-5 ***COMPLETE*** (12 Jul 2024 10:01)  Immunotherapy @ [facility]: every 2 weeks (12 Jul 2024 10:01)      MEDICATIONS  (STANDING):  enoxaparin Injectable 40 milliGRAM(s) SubCutaneous every 24 hours  lactated ringers. 1000 milliLiter(s) (125 mL/Hr) IV Continuous <Continuous>  piperacillin/tazobactam IVPB.. 3.375 Gram(s) IV Intermittent every 8 hours  sodium chloride 0.9%. 1000 milliLiter(s) (125 mL/Hr) IV Continuous <Continuous>    MEDICATIONS  (PRN):  acetaminophen     Tablet .. 650 milliGRAM(s) Oral every 6 hours PRN Temp greater or equal to 38C (100.4F), Mild Pain (1 - 3)  aluminum hydroxide/magnesium hydroxide/simethicone Suspension 30 milliLiter(s) Oral every 4 hours PRN Dyspepsia  melatonin 3 milliGRAM(s) Oral at bedtime PRN Insomnia  ondansetron Injectable 4 milliGRAM(s) IV Push every 8 hours PRN Nausea and/or Vomiting      Allergies    No Known Allergies    Intolerances        SOCIAL HISTORY: NC    FAMILY HISTORY: NC      ROS  As above  Otherwise unremarkable, all systems reviewed    PE:  Vital Signs Last 24 Hrs  T(C): 37 (12 Jul 2024 16:41), Max: 37.8 (11 Jul 2024 19:35)  T(F): 98.6 (12 Jul 2024 16:41), Max: 100 (11 Jul 2024 19:35)  HR: 84 (12 Jul 2024 16:41) (84 - 101)  BP: 114/79 (12 Jul 2024 16:41) (104/70 - 129/96)  BP(mean): 102 (11 Jul 2024 21:15) (102 - 102)  RR: 18 (12 Jul 2024 16:41) (16 - 18)  SpO2: 98% (12 Jul 2024 16:41) (96% - 100%)    Parameters below as of 12 Jul 2024 16:41  Patient On (Oxygen Delivery Method): room air        Constitutional: NAD, well-developed, A+Ox3  Jaundiced  +icteric   Respiratory: CTABL, breathing comfortably  Cardiovascular: S1 and S2, RRR  Gastrointestinal: +BS, soft, non tender, non distended, no mass; umbilical hernia noted  Extremities: warm, well perfused, no edema  Psychiatric: Normal mood, normal affect  Neuro: moves all extremities, grossly intact  Skin: diffuse nodular rash    LABS:                        12.8   6.85  )-----------( 188      ( 12 Jul 2024 06:33 )             37.1     07-12    137  |  107  |  6<L>  ----------------------------<  95  3.4<L>   |  24  |  0.61    Ca    8.5      12 Jul 2024 06:33    TPro  6.6  /  Alb  2.5<L>  /  TBili  9.4<H>  /  DBili  7.6<H>  /  AST  123<H>  /  ALT  61  /  AlkPhos  255<H>  07-12    PT/INR - ( 11 Jul 2024 15:29 )   PT: 13.7 sec;   INR: 1.22 ratio         PTT - ( 11 Jul 2024 15:29 )  PTT:29.5 sec  LIVER FUNCTIONS - ( 12 Jul 2024 12:41 )  Alb: 2.5 g/dL / Pro: 6.6 gm/dL / ALK PHOS: 255 U/L / ALT: 61 U/L / AST: 123 U/L / GGT: x             RADIOLOGY & ADDITIONAL STUDIES: see HPI
HPI:    49-year-old male PMH metastatic colon CA with liver and lung metastases on panitumumab (last dose 7/10/24) who follows at [facility], HTN, presents emergency department for right upper quadrant pain associated with fever Tmax 100.4 °F, chills, jaundice, dark urine for the past 2 weeks found to have bili 8.2     In ED vitals stable, CBC/BMP unremarkable, LFTs noted for T. bili 8.2, D. bili 6.7, Alk phos 249, AST//74, UA sterile with bilirubin and ketones. COVID/Flu negative. US abdomen noted for hepatomegaly with abnormal appearance of the liver echotexture and echogenicity. Multiple liver lesions are seen. There is also a large masslike lesion near the pancreatic body. Evidence of intrahepatic and extrahepatic biliary duct dilatation. These findings are worrisome for an underlying neoplasm. Patient admitted to  for further evaluation.  (11 Jul 2024 20:17)      PAST MEDICAL & SURGICAL HISTORY:  Colon cancer      Hypertension      History of biliary stent insertion      S/P hernia surgery      History of ablation of neoplasm of liver      S/P colon resection          Allergies    No Known Allergies    Intolerances        MEDICATIONS  (STANDING):  enoxaparin Injectable 40 milliGRAM(s) SubCutaneous every 24 hours  lactated ringers. 1000 milliLiter(s) (125 mL/Hr) IV Continuous <Continuous>  piperacillin/tazobactam IVPB.. 3.375 Gram(s) IV Intermittent every 8 hours  potassium chloride   Powder 40 milliEquivalent(s) Oral once  sodium chloride 0.9%. 1000 milliLiter(s) (125 mL/Hr) IV Continuous <Continuous>    MEDICATIONS  (PRN):  acetaminophen     Tablet .. 650 milliGRAM(s) Oral every 6 hours PRN Temp greater or equal to 38C (100.4F), Mild Pain (1 - 3)  aluminum hydroxide/magnesium hydroxide/simethicone Suspension 30 milliLiter(s) Oral every 4 hours PRN Dyspepsia  melatonin 3 milliGRAM(s) Oral at bedtime PRN Insomnia  ondansetron Injectable 4 milliGRAM(s) IV Push every 8 hours PRN Nausea and/or Vomiting      FAMILY HISTORY:      SOCIAL HISTORY: No EtOH, no tobacco    REVIEW OF SYSTEMS:    CONSTITUTIONAL: +weakness, mild fevers or chills  EYES/ENT: No visual changes;  No vertigo or throat pain   NECK: No pain or stiffness  RESPIRATORY: No cough, wheezing, hemoptysis; No shortness of breath  CARDIOVASCULAR: No chest pain or palpitations  GASTROINTESTINAL: mild right abdominal or epigastric pain. No nausea, vomiting, or hematemesis; No diarrhea or constipation. No melena or hematochezia.  GENITOURINARY: No dysuria, frequency or hematuria  NEUROLOGICAL: No numbness or weakness  SKIN: No itching, burning, rashes, or lesions   All other review of systems is negative unless indicated above.    Height (cm): 185.4 (07-11 @ 14:14)  Weight (kg): 113.3 (07-11 @ 14:13)  BMI (kg/m2): 33 (07-11 @ 14:14)  BSA (m2): 2.36 (07-11 @ 14:14)    T(F): 98.8 (07-12-24 @ 09:17), Max: 100 (07-11-24 @ 19:35)  HR: 88 (07-12-24 @ 09:17)  BP: 113/74 (07-12-24 @ 09:17)  RR: 18 (07-12-24 @ 09:17)  SpO2: 99% (07-12-24 @ 09:17)  Wt(kg): --    GENERAL: NAD,  EYES: EOMI, sclera icterus  CHEST/LUNG: Clear to auscultation bilaterally;   HEART: Regular rate and rhythm;   ABDOMEN: mild RuQ pain  EXTREMITIES: no edema  NEUROLOGY: non-focal  SKIN: jaundice                          12.8   6.85  )-----------( 188      ( 12 Jul 2024 06:33 )             37.1       07-12    137  |  107  |  6<L>  ----------------------------<  95  3.4<L>   |  24  |  0.61    Ca    8.5      12 Jul 2024 06:33    TPro  6.7  /  Alb  2.5<L>  /  TBili  9.1<H>  /  DBili  7.3<H>  /  AST  128<H>  /  ALT  59  /  AlkPhos  255<H>  07-12          PT/INR - ( 11 Jul 2024 15:29 )   PT: 13.7 sec;   INR: 1.22 ratio         PTT - ( 11 Jul 2024 15:29 )  PTT:29.5 sec    
Patient is a 49y old  Male who presents with a chief complaint of Jaundice (17 Jul 2024 17:38)    HPI:  49-year-old male PMH: CA with liver and lung metastases on panitumumab (last dose 7/10), HTN, presents emergency department for right upper quadrant pain associated with fever Tmax 100.4 °F, chills, jaundice, dark urine for the past 2 weeks. No other complaints, and ROS otherwise benign.   In ED vitals stable, CBC/BMP unremarkable, LFTs noted for T. bili 8.2, D. bili 6.7, Alk phos 249, AST//74, UA sterile with bilirubin and ketones. COVID/Flu negative. US abdomen noted for hepatomegaly with abnormal appearance of the liver echotexture and echogenicity. Multiple liver lesions are seen. There is also a large masslike lesion near the pancreatic body. Evidence of intrahepatic and extrahepatic biliary duct dilatation. These findings are worrisome for an underlying neoplasm. Patient admitted to  for further evaluation.       PMH: as above  PSH: as above  Meds: per reconciliation sheet, noted below  MEDICATIONS  (STANDING):  cholecalciferol 2000 Unit(s) Oral daily  lactobacillus acidophilus 1 Tablet(s) Oral daily  piperacillin/tazobactam IVPB.. 3.375 Gram(s) IV Intermittent every 8 hours  sodium chloride 0.9% with potassium chloride 20 mEq/L 1000 milliLiter(s) (75 mL/Hr) IV Continuous <Continuous>    Allergies    No Known Allergies    Intolerances      Social: no smoking, no alcohol, no illegal drugs; no recent travel, no exposure to TB  FAMILY HISTORY:     no history of premature cardiovascular disease in first degree relatives    ROS: the patient denies fever, no chills, no HA, no dizziness, no sore throat, no blurry vision, no CP, no palpitations, no SOB, no cough, + abdominal pain, no diarrhea, no N/V, no dysuria, no leg pain, no claudication, no rash, no joint aches, no rectal pain or bleeding, no night sweats    All other systems reviewed and are negative    Vital Signs Last 24 Hrs  T(C): 36.2 (17 Jul 2024 15:43), Max: 36.6 (17 Jul 2024 01:08)  T(F): 97.2 (17 Jul 2024 15:43), Max: 97.8 (17 Jul 2024 01:08)  HR: 87 (17 Jul 2024 15:43) (68 - 87)  BP: 124/90 (17 Jul 2024 15:43) (103/69 - 124/90)  BP(mean): --  RR: 18 (17 Jul 2024 15:43) (11 - 18)  SpO2: 98% (17 Jul 2024 15:43) (96% - 99%)    Parameters below as of 17 Jul 2024 15:43  Patient On (Oxygen Delivery Method): room air      Daily     Daily     PE:  Constitutional: NAD  HEENT: NC/AT, EOMI, PERRLA, conjunctivae clear; ears and nose atraumatic; pharynx benign  Neck: supple; thyroid not palpable  Back: no tenderness  Respiratory: respiratory effort normal; clear to auscultation  Cardiovascular: S1S2 regular, no murmurs  Abdomen: soft, tender, not distended, positive BS; liver and spleen WNL  Genitourinary: no suprapubic tenderness  Lymphatic: no LN palpable  Musculoskeletal: no muscle tenderness, no joint swelling or tenderness  Extremities: no pedal edema  Neurological/ Psychiatric: AxOx3, Judgement and insight normal;  moving all extremities  Skin: no rashes; no palpable lesions    Labs: all available labs reviewed                        12.8   6.30  )-----------( 214      ( 17 Jul 2024 07:41 )             37.7     07-17    140  |  108  |  7   ----------------------------<  105<H>  3.7   |  24  |  0.55    Ca    8.9      17 Jul 2024 07:41  Phos  2.8     07-17  Mg     1.6     07-17    TPro  6.9  /  Alb  2.5<L>  /  TBili  8.7<H>  /  DBili  7.2<H>  /  AST  132<H>  /  ALT  66  /  AlkPhos  339<H>  07-17     LIVER FUNCTIONS - ( 17 Jul 2024 07:41 )  Alb: 2.5 g/dL / Pro: 6.9 gm/dL / ALK PHOS: 339 U/L / ALT: 66 U/L / AST: 132 U/L / GGT: x           Urinalysis Basic - ( 17 Jul 2024 07:41 )    Color: x / Appearance: x / SG: x / pH: x  Gluc: 105 mg/dL / Ketone: x  / Bili: x / Urobili: x   Blood: x / Protein: x / Nitrite: x   Leuk Esterase: x / RBC: x / WBC x   Sq Epi: x / Non Sq Epi: x / Bacteria: x          Radiology: all available radiological tests reviewed  < from: MR MRCP w/wo IV Cont (07.12.24 @ 11:45) >  ACC: [id number] EXAM:  MR MRCP [facility] IC   ORDERED BY: AMARILIS MUKHERJEE     PROCEDURE DATE:  07/12/2024          INTERPRETATION:  CLINICAL INFORMATION: Right upper quadrant pain and   fever; rule out cholangitis or obstruction.    COMPARISON: CT February 20, 2023, ultrasound July 11, 2024    CONTRAST/COMPLICATIONS:  IV Contrast: Gadavist  10 cc administered   0 cc discarded  Oral Contrast: NONE  Complications: None reported at time of study completion    PROCEDURE:  MRI of the abdomen was performed.  MRCP was performed.    FINDINGS:  LOWER CHEST: Multiple bilateral pulmonary nodules measuring up to 9 mm in   the left lung.    LIVER: Large arterial phase enhancing mass involving segments 4A4B,, 5   and 7 and measuring 12.5 x 8.4 cm. The mass compresses the right and left   main ducts and the proximal common duct. Multiple additional bilobar   liver lesions, for example a segment 7 lesion measuring 2.6 cm (23; 22).  BILE DUCTS: Mild intrahepatic biliary ductal dilatation, left side   greater than right. Narrowing of both the right and left main ducts by   the above-described large centrally located solid intrahepatic mass.The   distal common duct is normal in caliber. Common duct stents in place,   extending into the right biliary tree.  GALLBLADDER: Cholelithiasis. Suspicion for solid soft tissue mass in the   mid gallbladder  SPLEEN: Enlarged, measuring 14.7 cm.  PANCREAS: Within normal limits.  ADRENALS: Within normal limits.  KIDNEYS/URETERS: Within normal limits.    VISUALIZED PORTIONS:  BOWEL: Within normal limits. Normal appendix.  PERITONEUM: Small amount of perihepatic ascites. 1.1 cm perihepatic soft   tissue nodule (23; 47).  VESSELS: Portal and hepatic veins are patent. Middle hepatic vein not   well visualized due to surrounding tumor.  RETROPERITONEUM/LYMPH NODES: Bulky retrocrural, gastrohepatic ligament,   clinton hepatis, peripancreatic and retroperitoneal lymphadenopathy, for   example:  4.2 x 3.2 cm clinton hepatis node (22; 44)  4.4 x 3.8 cm gastrohepatic ligament lymph node (22; 36).  ABDOMINAL WALL: Moderate-sized fat-containing periumbilical hernia.  BONES: L3 vertebral body hemangioma.    IMPRESSION:    Suspicion for solid soft tissue mass in the gallbladder raising the   possibility of malignant malignancy.      Advanced directives addressed: full resuscitation

## 2024-07-17 NOTE — PROGRESS NOTE ADULT - ASSESSMENT
50yo M s/p left biliary drain placement  - Tbili 9.7 today  - WBC WNL  - 500cc blood tinged bile out overnight  - H/H Stable

## 2024-07-18 ENCOUNTER — TRANSCRIPTION ENCOUNTER (OUTPATIENT)
Age: 49
End: 2024-07-18

## 2024-07-18 VITALS
HEART RATE: 79 BPM | DIASTOLIC BLOOD PRESSURE: 66 MMHG | SYSTOLIC BLOOD PRESSURE: 118 MMHG | TEMPERATURE: 98 F | OXYGEN SATURATION: 99 % | RESPIRATION RATE: 16 BRPM

## 2024-07-18 LAB
ALBUMIN SERPL ELPH-MCNC: 2.5 G/DL — LOW (ref 3.3–5)
ALP SERPL-CCNC: 294 U/L — HIGH (ref 40–120)
ALT FLD-CCNC: 52 U/L — SIGNIFICANT CHANGE UP (ref 12–78)
ANION GAP SERPL CALC-SCNC: 8 MMOL/L — SIGNIFICANT CHANGE UP (ref 5–17)
AST SERPL-CCNC: 96 U/L — HIGH (ref 15–37)
BILIRUB DIRECT SERPL-MCNC: 6.3 MG/DL — HIGH (ref 0–0.3)
BILIRUB INDIRECT FLD-MCNC: 1.9 MG/DL — HIGH (ref 0.2–1)
BILIRUB SERPL-MCNC: 8.2 MG/DL — HIGH (ref 0.2–1.2)
BUN SERPL-MCNC: 8 MG/DL — SIGNIFICANT CHANGE UP (ref 7–23)
CALCIUM SERPL-MCNC: 8.3 MG/DL — LOW (ref 8.5–10.1)
CHLORIDE SERPL-SCNC: 108 MMOL/L — SIGNIFICANT CHANGE UP (ref 96–108)
CO2 SERPL-SCNC: 23 MMOL/L — SIGNIFICANT CHANGE UP (ref 22–31)
CREAT SERPL-MCNC: 0.5 MG/DL — SIGNIFICANT CHANGE UP (ref 0.5–1.3)
CRP SERPL-MCNC: 18 MG/L — HIGH
EGFR: 124 ML/MIN/1.73M2 — SIGNIFICANT CHANGE UP
GLUCOSE SERPL-MCNC: 110 MG/DL — HIGH (ref 70–99)
HCT VFR BLD CALC: 37.7 % — LOW (ref 39–50)
HCV RNA FLD QL NAA+PROBE: SIGNIFICANT CHANGE UP
HCV RNA SPEC QL PROBE+SIG AMP: SIGNIFICANT CHANGE UP
HGB BLD-MCNC: 12.8 G/DL — LOW (ref 13–17)
MAGNESIUM SERPL-MCNC: 1.4 MG/DL — LOW (ref 1.6–2.6)
MCHC RBC-ENTMCNC: 32.2 PG — SIGNIFICANT CHANGE UP (ref 27–34)
MCHC RBC-ENTMCNC: 34 GM/DL — SIGNIFICANT CHANGE UP (ref 32–36)
MCV RBC AUTO: 95 FL — SIGNIFICANT CHANGE UP (ref 80–100)
PHOSPHATE SERPL-MCNC: 2.5 MG/DL — SIGNIFICANT CHANGE UP (ref 2.5–4.5)
PLATELET # BLD AUTO: 216 K/UL — SIGNIFICANT CHANGE UP (ref 150–400)
POTASSIUM SERPL-MCNC: 3.7 MMOL/L — SIGNIFICANT CHANGE UP (ref 3.5–5.3)
POTASSIUM SERPL-SCNC: 3.7 MMOL/L — SIGNIFICANT CHANGE UP (ref 3.5–5.3)
PROT SERPL-MCNC: 6.8 GM/DL — SIGNIFICANT CHANGE UP (ref 6–8.3)
RBC # BLD: 3.97 M/UL — LOW (ref 4.2–5.8)
RBC # FLD: 13.5 % — SIGNIFICANT CHANGE UP (ref 10.3–14.5)
SODIUM SERPL-SCNC: 139 MMOL/L — SIGNIFICANT CHANGE UP (ref 135–145)
WBC # BLD: 6.93 K/UL — SIGNIFICANT CHANGE UP (ref 3.8–10.5)
WBC # FLD AUTO: 6.93 K/UL — SIGNIFICANT CHANGE UP (ref 3.8–10.5)

## 2024-07-18 PROCEDURE — 99231 SBSQ HOSP IP/OBS SF/LOW 25: CPT

## 2024-07-18 PROCEDURE — 99239 HOSP IP/OBS DSCHRG MGMT >30: CPT

## 2024-07-18 RX ORDER — AMOXICILLIN/POTASSIUM CLAV 250-125 MG
1 TABLET ORAL
Qty: 16 | Refills: 0
Start: 2024-07-18 | End: 2024-07-25

## 2024-07-18 RX ORDER — MAGNESIUM SULFATE 100 %
1 POWDER (GRAM) MISCELLANEOUS ONCE
Refills: 0 | Status: COMPLETED | OUTPATIENT
Start: 2024-07-18 | End: 2024-07-18

## 2024-07-18 RX ADMIN — Medication 1 TABLET(S): at 10:01

## 2024-07-18 RX ADMIN — DEXTROSE MONOHYDRATE, SODIUM CHLORIDE, AND POTASSIUM CHLORIDE 75 MILLILITER(S): 50; 4.5; 2.24 INJECTION, SOLUTION INTRAVENOUS at 11:41

## 2024-07-18 RX ADMIN — Medication 100 GRAM(S): at 10:02

## 2024-07-18 RX ADMIN — PIPERACILLIN SODIUM AND TAZOBACTAM SODIUM 25 GRAM(S): 3; .375 INJECTION, POWDER, LYOPHILIZED, FOR SOLUTION INTRAVENOUS at 13:20

## 2024-07-18 RX ADMIN — PIPERACILLIN SODIUM AND TAZOBACTAM SODIUM 25 GRAM(S): 3; .375 INJECTION, POWDER, LYOPHILIZED, FOR SOLUTION INTRAVENOUS at 05:28

## 2024-07-18 RX ADMIN — Medication 2000 UNIT(S): at 10:01

## 2024-07-18 NOTE — PROGRESS NOTE ADULT - SUBJECTIVE AND OBJECTIVE BOX
49-year-old male PMH: CA with liver and lung metastases on panitumumab (last dose 7/10), HTN, presents emergency department for right upper quadrant pain associated with fever Tmax 100.4 °F, chills, jaundice. Pt now s/p ERCP which noted patent stent and possible occluded left sided ducts. Pt now s/p placement of left biliary drain with IR. Seen at bedside, feeling well with no complaints.         Vital Signs Last 24 Hrs  T(C): 36.6 (18 Jul 2024 08:14), Max: 37 (17 Jul 2024 21:25)  T(F): 97.9 (18 Jul 2024 08:14), Max: 98.6 (17 Jul 2024 21:25)  HR: 79 (18 Jul 2024 08:14) (79 - 88)  BP: 118/66 (18 Jul 2024 08:14) (109/79 - 124/90)  BP(mean): --  RR: 16 (18 Jul 2024 08:14) (16 - 18)  SpO2: 99% (18 Jul 2024 08:14) (98% - 99%)    Parameters below as of 18 Jul 2024 08:14  Patient On (Oxygen Delivery Method): room air        GENERAL APPEARANCE: Well developed, in no acute distress.    LUNGS: Auscultation of the lungs revealed normal breath sounds without any other adventitious sounds or rubs.    CARDIOVASCULAR: There was a regular rate and rhythm    ABDOMEN: Soft and nontender with normal bowel sounds.     NEUROLOGIC: Alert and oriented x 3. Normal affect.       CBC                        12.8   6.93  )-----------( 216      ( 18 Jul 2024 06:04 )             37.7       Chemistry  07-18    139  |  108  |  8   ----------------------------<  110<H>  3.7   |  23  |  0.50    Ca    8.3<L>      18 Jul 2024 06:04  Phos  2.5     07-18  Mg     1.4     07-18    TPro  6.8  /  Alb  2.5<L>  /  TBili  8.2<H>  /  DBili  6.3<H>  /  AST  96<H>  /  ALT  52  /  AlkPhos  294<H>  07-18

## 2024-07-18 NOTE — PROGRESS NOTE ADULT - ASSESSMENT
49-year-old male PMH: CA with liver and lung metastases on panitumumab (last dose 7/10), HTN, presents emergency department for right upper quadrant pain associated with fever Tmax 100.4 °F, chills, jaundice, dark urine for the past 2 weeks. No other complaints, and ROS otherwise benign.     1. Fever. Acute cholangitis. Metastatic colon cancer. Immunocompromised host   - imaging reviewed  - s/p biliary drain placement 7/16  - fu cultures - blood cx no growth  - on zosyn [id number] #7   - continue with abx coverage  - monitor temps  - tolerating abx well so far; no side effects noted  - reason for abx use and side effects reviewed with patient  - supportive care  - fu cbc    Clinical team may change from intravenous to oral antibiotics when the following criteria are met:   1. Patient is clinically improving/stable       a)	Improved signs and symptoms of infection from initial presentation       b)	Afebrile for 24 hours       c)	Leukocytosis trending towards normal range   2. Patient is tolerating oral intake   3. Initial/repeat blood cultures are negative     When above criteria met, may change iv antibiotics to: po augmentin [id number] x 14 days total

## 2024-07-18 NOTE — DISCHARGE NOTE PROVIDER - NSDCCPTREATMENT_GEN_ALL_CORE_FT
PRINCIPAL PROCEDURE  Procedure: CT guided drainage of biliary ducts and gallbladder  Findings and Treatment: Anesthesia: Monitored anesthesia care.  Complications: No immediate.  Contrast: 20 cc into the biliary tree.  Procedure technique and findings:  Informed consent was obtained from the patient. Patient was placed supine   on the table in the angiography suite. The left lobe of the liver was   evaluated with gray scale sonography. Dilated intrahepatic biliary ducts   were noted. The skin of the epigastric region was prepped and draped in   the usual sterile fashion. Under ultrasound and fluoroscopic   visualization, a left intrahepatic biliary duct was accessed with a Greb   Set. Cholangiogram was performed demonstrating dilated left-sided   intrahepatic biliary radicals tapering to near complete occlusion with   irregular margins centrally within the left lobe consistent with   circumferential tumor. This empties into a stented common bile duct which   is patent. Medial nondilated right-sided intrahepatic biliary radicals   are visualized via reflux. Over an Amplatz wire, exchange was made under   fluoroscopic visualization for an 8.5 Lao pigtail external biliary   drain looped within the most dilated portion of the left-sided bile ducts   to support the cope loop. Blood-tinged bile was noted draining from the   catheter. A specimen was obtained for culture. The catheter was flushed   and connected to gravity drainage. Catheter was secured with silk suture.   A sterile dressing was applied. Completion fluoroscopic image shows   decompression of the bile duct the catheter in good position and residual   contrast in the CBD and small bowel.  IMPRESSION: Left-sided PTC with findings as described above consistent   with central obstruction. External left biliary drain placed.  --- End of Report ---  HERBIE RALPH MD; Attending Radiologist  This document has been electronically signed. Jul 17 2024  3:35PM        SECONDARY PROCEDURE  Procedure: MR MRCP  Findings and Treatment:   IMPRESSION:  Suspicion for solid soft tissue mass in the gallbladder raising the   possibility of malignant malignancy.  Mild intra and extra hepatic biliary ductal dilatation, common duct stent   in place.  Large centrally located liver mass could represent spread from the   above-described gallbladder mass versus primary or metastatic neoplasm.  Multiple additional liver lesions, lung nodules, and bulky abdominal   ly

## 2024-07-18 NOTE — DISCHARGE NOTE PROVIDER - NSDCDCMDCOMP_GEN_ALL_CORE
Oriented - self; Oriented - place; Oriented - time This document is complete and the patient is ready for discharge.

## 2024-07-18 NOTE — DISCHARGE NOTE PROVIDER - NSDCCPCAREPLAN_GEN_ALL_CORE_FT
PRINCIPAL DISCHARGE DIAGNOSIS  Diagnosis: Cholangitis  Assessment and Plan of Treatment: due to blockage of biliary ducts  continue antibiotics as prescribe  return to ED if fever, abdominal pain, chills, any other concerns      SECONDARY DISCHARGE DIAGNOSES  Diagnosis: Elevated bilirubin  Assessment and Plan of Treatment: you had biliary drain place , flush the drain with 10 cc NS daily, change dressing once a week or when soiled  follow up with GI psecialist for further monitoring  repeat blood work in 1 week with oncologist or PCP    Diagnosis: Metastatic colon cancer to liver  Assessment and Plan of Treatment: follow up with oncologist within 1 week for further care    Diagnosis: Vitamin D deficiency  Assessment and Plan of Treatment: take vitamin D    Diagnosis: H/O chronic hepatitis  Assessment and Plan of Treatment: your Hepatitis C virus load negative   Hepatitis C RNA, Qualitative: Eloina (07.17.24 @ 07:41)  Hepatitis C Virus Interpretation: Reactive

## 2024-07-18 NOTE — DISCHARGE NOTE PROVIDER - CARE PROVIDER_API CALL
Mando Nicolas  Gastroenterology  [address], [address] [place] [address]  Phone: [phone]  Fax: [phone]  Follow Up Time: 1 week    [facility] oncologist,   Phone: (   )    -  Fax: (   )    -  Follow Up Time: 1 week    Panda Conde  [facility]  [address] [place] [address]  Phone: [phone]  Fax: [phone]  Follow Up Time: 1 week

## 2024-07-18 NOTE — DISCHARGE NOTE PROVIDER - CARE PROVIDERS DIRECT ADDRESSES
[email or web address],DirectAddress_Unknown,DirectAddress_Unknown

## 2024-07-18 NOTE — PROGRESS NOTE ADULT - PROBLEM SELECTOR PLAN 1
- Continue to monitor outputs  - Recommend flushing with 10cc sterile saline daily
- Continue to monitor outputs  - Recommend flushing with 10cc sterile saline daily  - If pt is discharged, recommend VNS to help with drain care  - Change dressing ever 2-3 days or whenever soiled  - If biliary drain has no significant affect on decreasing bilirubin, pt should return to IR in about 6 weeks for drain check

## 2024-07-18 NOTE — DISCHARGE NOTE PROVIDER - NSDCMRMEDTOKEN_GEN_ALL_CORE_FT
amoxicillin-clavulanate 875 mg-125 mg oral tablet: 1 tab(s) orally 2 times a day  cholecalciferol 50 mcg (2000 intl units) oral tablet: 1 tab(s) orally once a day  lactobacillus acidophilus oral capsule: 1 cap(s) orally 2 times a day take 2 h after antibiotics  melatonin 3 mg oral tablet: 1 tab(s) orally once a day (at bedtime) as needed for Insomnia

## 2024-07-18 NOTE — PROGRESS NOTE ADULT - PROBLEM SELECTOR PROBLEM 1
Primary colon cancer with metastasis to other site
Primary colon cancer with metastasis to other site

## 2024-07-18 NOTE — PROGRESS NOTE ADULT - NUTRITIONAL ASSESSMENT
48yo M s/p left biliary drain placement  - Tbili 8.2 today, no significant downtrend (8.7 yesterday)  - WBC WNL  - 150cc dark bilious drainage overnight  - H/H Stable

## 2024-07-18 NOTE — DISCHARGE NOTE PROVIDER - HOSPITAL COURSE
HOSPITALIST ATTENDING PROGRESS NOTE    Chart and meds reviewed.  Patient seen and examined.    CC: Jaundiced    7/17 - no abdominal pain, drain draining blood tinged bile,  No acute issues overnight. Tolerating po and abx.   7/18 - no abdominal pain, tolerating po intake, afebrile, plan discussed in length    All other systems reviewed and found to be negative with the exception of what has been described above.  Vital sings reviewed for last 24 h  T(C): 36.6 (07-18-24 @ 08:14), Max: 37 (07-17-24 @ 21:25)  T(F): 97.9 (07-18-24 @ 08:14), Max: 98.6 (07-17-24 @ 21:25)  HR: 79 (07-18-24 @ 08:14) (79 - 88)  BP: 118/66 (07-18-24 @ 08:14) (109/79 - 124/90)  RR: 16 (07-18-24 @ 08:14) (16 - 18)  SpO2: 99% (07-18-24 @ 08:14) (98% - 99%)    Physical exam :   HEENT:  pupils equal and reactive, EOMI, + jaundiced conjunctiva no oropharyngeal lesions, erythema, exudates, oral thrush,   NECK:   supple, no carotid bruits  CV:  +S1, +S2, regular, no murmurs  RESP:   lungs clear to auscultation bilaterally, no wheezing, rales, rhonchi, good air entry bilaterally  GI:  abdomen soft, non-tender, non-distended, normal BS  EXT:  no clubbing, no cyanosis, no edema, no calf pain, swelling or erythema  NEURO:  AAOX3, no focal neurological deficits, follows all commands, able to move extremities spontaneously  SKIN:  no ulcers, lesions or rashes    LABS: all reviewed  Assessment and Plan:   · Assessment    49-year-old male PMH: Colon CA with liver and lung metastases on panitumumab (last dose 7/10), HTN, presents emergency department for right upper quadrant pain associated with fever Tmax 100.4 °F, chills, jaundice, dark urine for the past 2 weeks. No other complaints, and ROS otherwise benign.   In ED vitals stable, CBC/BMP unremarkable, LFTs noted for T. bili 8.2, D. bili 6.7, Alk phos 249, AST//74, UA sterile with bilirubin and ketones. COVID/Flu negative. US abdomen noted for hepatomegaly with abnormal appearance of the liver echotexture and echogenicity. Multiple liver lesions are seen. There is also a large masslike lesion near the pancreatic body. Evidence of intrahepatic and extrahepatic biliary duct dilatation. These findings are worrisome for an underlying neoplasm. Patient admitted to  for further evaluation.     # Obstructive jaundice   # Metastatic colon cancer to the liver s/p CBD stent with intra and extrahepatic ducts dilation  s/p biliary drain placement 7/16   # Febrile syndrome possible cholangitis  # Transaminitis, hyperbilirubinemia due to above   - MRCP - GB mass, mild intra and extra hepatic biliary ducts dilation, CBD stent present , large liver mass , multiple liver lesions, lung nodules, bulky abdominal LAD , peritoneal implants  - GI consult appreciated   - 7/15 s/p  ERCP   - 7/16 - s/p biliary drain - output in 24 h - 50 cc , flush the drain daily with 10 cc NS  - Monitor LFTs  - Continue zosyn add probiotics, CRP trending down 20--> 16 --> po augmentin  825 bid for 8 days  - ID consult   - FU cultures - neg x2   - Tylenol for fever     # Colon Ca with mets  - pt follows at [facility] w dr angeles  - currently on single agent panitumumab- started in April 2024- current     # Vitamin D deficiency  - replace daily     # h/o Hepatitis C - treated, hepatitis C dna - neg     Final diagnosis, treatment plan, and follow-up recommendations were discussed and explained to the patient.   The patient was given an opportunity to ask questions concerning the diagnosis and treatment plan.   The patient acknowledged understanding of the diagnosis, treatment, and follow-up recommendations.   The patient was advised to seek urgent care upon discharge if worsening symptoms develop prior to scheduled follow-up.   Time spent on discharge included time with the patient, and also coordinating discharge care as outlined below.  Discharge note faxed to PCP with my contact information to call me back   PCP Dr. Conde  Total time spent: 50 min

## 2024-07-18 NOTE — PROGRESS NOTE ADULT - SUBJECTIVE AND OBJECTIVE BOX
Date of service: 07-18-24 @ 10:47    pt seen and examined  feels better   no fevers  jaundiced    ROS: no fever or chills; denies dizziness, no HA, no SOB or cough,no diarrhea or constipation; no dysuria, no urinary frequency, no legs pain, no rashes    MEDICATIONS  (STANDING):  cholecalciferol 2000 Unit(s) Oral daily  lactobacillus acidophilus 1 Tablet(s) Oral daily  piperacillin/tazobactam IVPB.. 3.375 Gram(s) IV Intermittent every 8 hours  sodium chloride 0.9% with potassium chloride 20 mEq/L 1000 milliLiter(s) (75 mL/Hr) IV Continuous <Continuous>    Vital Signs Last 24 Hrs  T(C): 36.6 (18 Jul 2024 08:14), Max: 37 (17 Jul 2024 21:25)  T(F): 97.9 (18 Jul 2024 08:14), Max: 98.6 (17 Jul 2024 21:25)  HR: 79 (18 Jul 2024 08:14) (79 - 88)  BP: 118/66 (18 Jul 2024 08:14) (109/79 - 124/90)  BP(mean): --  RR: 16 (18 Jul 2024 08:14) (16 - 18)  SpO2: 99% (18 Jul 2024 08:14) (98% - 99%)    Parameters below as of 18 Jul 2024 08:14  Patient On (Oxygen Delivery Method): room air    PE:  Constitutional: NAD  HEENT: NC/AT, EOMI, PERRLA, conjunctivae clear; ears and nose atraumatic; pharynx benign  Neck: supple; thyroid not palpable  Back: no tenderness  Respiratory: respiratory effort normal; clear to auscultation  Cardiovascular: S1S2 regular, no murmurs  Abdomen: soft, tender, not distended, positive BS; liver and spleen WNL  Genitourinary: no suprapubic tenderness  Lymphatic: no LN palpable  Musculoskeletal: no muscle tenderness, no joint swelling or tenderness  Extremities: no pedal edema  Neurological/ Psychiatric: AxOx3, Judgement and insight normal;  moving all extremities  Skin: no rashes; no palpable lesions    Labs: all available labs reviewed                                   12.8   6.93  )-----------( 216      ( 18 Jul 2024 06:04 )             37.7     07-18    139  |  108  |  8   ----------------------------<  110<H>  3.7   |  23  |  0.50    Ca    8.3<L>      18 Jul 2024 06:04  Phos  2.5     07-18  Mg     1.4     07-18    TPro  6.8  /  Alb  2.5<L>  /  TBili  8.2<H>  /  DBili  6.3<H>  /  AST  96<H>  /  ALT  52  /  AlkPhos  294<H>  07-18    Urinalysis Basic - ( 17 Jul 2024 07:41 )    Color: x / Appearance: x / SG: x / pH: x  Gluc: 105 mg/dL / Ketone: x  / Bili: x / Urobili: x   Blood: x / Protein: x / Nitrite: x   Leuk Esterase: x / RBC: x / WBC x   Sq Epi: x / Non Sq Epi: x / Bacteria: x    Culture - Acid Fast - Body Fluid w/Smear (07.16.24 @ 18:05)   Specimen Source: Bile Bile Fluid  Acid Fast Bacilli Smear:   No acid-fast bacilli seen by fluorochrome stain  Culture - Blood (07.11.24 @ 16:17)   Specimen Source: .Blood None  Culture Results:   No growth at 5 days    Radiology: all available radiological tests reviewed    ACC: [id number] EXAM:  MR MRCP [facility] IC   ORDERED BY: AMARILIS MUKHERJEE     PROCEDURE DATE:  07/12/2024          INTERPRETATION:  CLINICAL INFORMATION: Right upper quadrant pain and   fever; rule out cholangitis or obstruction.    COMPARISON: CT February 20, 2023, ultrasound July 11, 2024    CONTRAST/COMPLICATIONS:  IV Contrast: Gadavist  10 cc administered   0 cc discarded  Oral Contrast: NONE  Complications: None reported at time of study completion    PROCEDURE:  MRI of the abdomen was performed.  MRCP was performed.    FINDINGS:  LOWER CHEST: Multiple bilateral pulmonary nodules measuring up to 9 mm in   the left lung.    LIVER: Large arterial phase enhancing mass involving segments 4A4B,, 5   and 7 and measuring 12.5 x 8.4 cm. The mass compresses the right and left   main ducts and the proximal common duct. Multiple additional bilobar   liver lesions, for example a segment 7 lesion measuring 2.6 cm (23; 22).  BILE DUCTS: Mild intrahepatic biliary ductal dilatation, left side   greater than right. Narrowing of both the right and left main ducts by   the above-described large centrally located solid intrahepatic mass.The   distal common duct is normal in caliber. Common duct stents in place,   extending into the right biliary tree.  GALLBLADDER: Cholelithiasis. Suspicion for solid soft tissue mass in the   mid gallbladder  SPLEEN: Enlarged, measuring 14.7 cm.  PANCREAS: Within normal limits.  ADRENALS: Within normal limits.  KIDNEYS/URETERS: Within normal limits.    VISUALIZED PORTIONS:  BOWEL: Within normal limits. Normal appendix.  PERITONEUM: Small amount of perihepatic ascites. 1.1 cm perihepatic soft   tissue nodule (23; 47).  VESSELS: Portal and hepatic veins are patent. Middle hepatic vein not   well visualized due to surrounding tumor.  RETROPERITONEUM/LYMPH NODES: Bulky retrocrural, gastrohepatic ligament,   clinton hepatis, peripancreatic and retroperitoneal lymphadenopathy, for   example:  4.2 x 3.2 cm clinton hepatis node (22; 44)  4.4 x 3.8 cm gastrohepatic ligament lymph node (22; 36).  ABDOMINAL WALL: Moderate-sized fat-containing periumbilical hernia.  BONES: L3 vertebral body hemangioma.    IMPRESSION:    Suspicion for solid soft tissue mass in the gallbladder raising the   possibility of malignant malignancy.      Advanced directives addressed: full resuscitation

## 2024-07-18 NOTE — DISCHARGE NOTE PROVIDER - PROVIDER TOKENS
PROVIDER:[TOKEN:[[id number]],FOLLOWUP:[1 week]],FREE:[LAST:[[facility] oncologist],PHONE:[(   )    -],FAX:[(   )    -],FOLLOWUP:[1 week]],PROVIDER:[TOKEN:[[id number]],FOLLOWUP:[1 week]]

## 2024-07-18 NOTE — PROGRESS NOTE ADULT - PROVIDER SPECIALTY LIST ADULT
Gastroenterology
Heme/Onc
Infectious Disease
Heme/Onc
Hospitalist
Intervent Radiology
Heme/Onc
Hospitalist
Intervent Radiology
Hospitalist
Hospitalist

## 2024-07-24 PROBLEM — I10 ESSENTIAL (PRIMARY) HYPERTENSION: Chronic | Status: ACTIVE | Noted: 2024-07-11

## 2024-07-24 PROBLEM — C18.9 MALIGNANT NEOPLASM OF COLON, UNSPECIFIED: Chronic | Status: ACTIVE | Noted: 2024-07-11

## 2024-07-25 DIAGNOSIS — Z79.899 OTHER LONG TERM (CURRENT) DRUG THERAPY: ICD-10-CM

## 2024-07-25 DIAGNOSIS — E55.9 VITAMIN D DEFICIENCY, UNSPECIFIED: ICD-10-CM

## 2024-07-25 DIAGNOSIS — I10 ESSENTIAL (PRIMARY) HYPERTENSION: ICD-10-CM

## 2024-07-25 DIAGNOSIS — C78.00 SECONDARY MALIGNANT NEOPLASM OF UNSPECIFIED LUNG: ICD-10-CM

## 2024-07-25 DIAGNOSIS — K83.1 OBSTRUCTION OF BILE DUCT: ICD-10-CM

## 2024-07-25 DIAGNOSIS — K83.09 OTHER CHOLANGITIS: ICD-10-CM

## 2024-07-25 DIAGNOSIS — C78.6 SECONDARY MALIGNANT NEOPLASM OF RETROPERITONEUM AND PERITONEUM: ICD-10-CM

## 2024-07-25 DIAGNOSIS — C78.7 SECONDARY MALIGNANT NEOPLASM OF LIVER AND INTRAHEPATIC BILE DUCT: ICD-10-CM

## 2024-07-25 DIAGNOSIS — C18.9 MALIGNANT NEOPLASM OF COLON, UNSPECIFIED: ICD-10-CM

## 2024-07-25 DIAGNOSIS — D84.821 IMMUNODEFICIENCY DUE TO DRUGS: ICD-10-CM

## 2024-07-25 DIAGNOSIS — G89.3 NEOPLASM RELATED PAIN (ACUTE) (CHRONIC): ICD-10-CM

## 2024-08-01 ENCOUNTER — INPATIENT (INPATIENT)
Facility: HOSPITAL | Age: 49
LOS: 15 days | Discharge: HOME HEALTH | DRG: 853 | End: 2024-08-17
Attending: FAMILY MEDICINE | Admitting: INTERNAL MEDICINE
Payer: COMMERCIAL

## 2024-08-01 VITALS
RESPIRATION RATE: 20 BRPM | SYSTOLIC BLOOD PRESSURE: 129 MMHG | TEMPERATURE: 101 F | HEIGHT: 73 IN | HEART RATE: 131 BPM | OXYGEN SATURATION: 100 % | DIASTOLIC BLOOD PRESSURE: 80 MMHG

## 2024-08-01 DIAGNOSIS — Z98.890 OTHER SPECIFIED POSTPROCEDURAL STATES: Chronic | ICD-10-CM

## 2024-08-01 DIAGNOSIS — Z90.49 ACQUIRED ABSENCE OF OTHER SPECIFIED PARTS OF DIGESTIVE TRACT: Chronic | ICD-10-CM

## 2024-08-01 DIAGNOSIS — R50.9 FEVER, UNSPECIFIED: ICD-10-CM

## 2024-08-01 LAB
ADD ON TEST-SPECIMEN IN LAB: SIGNIFICANT CHANGE UP
ALBUMIN SERPL ELPH-MCNC: 2.1 G/DL — LOW (ref 3.3–5)
ALBUMIN SERPL ELPH-MCNC: 2.5 G/DL — LOW (ref 3.3–5)
ALP SERPL-CCNC: 262 U/L — HIGH (ref 40–120)
ALP SERPL-CCNC: 297 U/L — HIGH (ref 40–120)
ALT FLD-CCNC: 46 U/L — SIGNIFICANT CHANGE UP (ref 12–78)
ALT FLD-CCNC: 53 U/L — SIGNIFICANT CHANGE UP (ref 12–78)
ANION GAP SERPL CALC-SCNC: 7 MMOL/L — SIGNIFICANT CHANGE UP (ref 5–17)
APPEARANCE UR: ABNORMAL
APTT BLD: 31.1 SEC — SIGNIFICANT CHANGE UP (ref 24.5–35.6)
AST SERPL-CCNC: 127 U/L — HIGH (ref 15–37)
AST SERPL-CCNC: 141 U/L — HIGH (ref 15–37)
BACTERIA # UR AUTO: NEGATIVE /HPF — SIGNIFICANT CHANGE UP
BASOPHILS # BLD AUTO: 0.03 K/UL — SIGNIFICANT CHANGE UP (ref 0–0.2)
BASOPHILS NFR BLD AUTO: 0.3 % — SIGNIFICANT CHANGE UP (ref 0–2)
BILIRUB DIRECT SERPL-MCNC: 10.7 MG/DL — HIGH (ref 0–0.3)
BILIRUB INDIRECT FLD-MCNC: 2.8 MG/DL — HIGH (ref 0.2–1)
BILIRUB SERPL-MCNC: 13.4 MG/DL — HIGH (ref 0.2–1.2)
BILIRUB SERPL-MCNC: 13.5 MG/DL — HIGH (ref 0.2–1.2)
BILIRUB UR-MCNC: ABNORMAL
BUN SERPL-MCNC: 11 MG/DL — SIGNIFICANT CHANGE UP (ref 7–23)
CALCIUM SERPL-MCNC: 8.7 MG/DL — SIGNIFICANT CHANGE UP (ref 8.5–10.1)
CAST: 0 /LPF — SIGNIFICANT CHANGE UP (ref 0–4)
CHLORIDE SERPL-SCNC: 101 MMOL/L — SIGNIFICANT CHANGE UP (ref 96–108)
CO2 SERPL-SCNC: 24 MMOL/L — SIGNIFICANT CHANGE UP (ref 22–31)
COLOR SPEC: ABNORMAL
CREAT SERPL-MCNC: 0.81 MG/DL — SIGNIFICANT CHANGE UP (ref 0.5–1.3)
DIFF PNL FLD: NEGATIVE — SIGNIFICANT CHANGE UP
EGFR: 107 ML/MIN/1.73M2 — SIGNIFICANT CHANGE UP
EGFR: 107 ML/MIN/1.73M2 — SIGNIFICANT CHANGE UP
EOSINOPHIL # BLD AUTO: 0.02 K/UL — SIGNIFICANT CHANGE UP (ref 0–0.5)
EOSINOPHIL NFR BLD AUTO: 0.2 % — SIGNIFICANT CHANGE UP (ref 0–6)
FLUAV AG NPH QL: SIGNIFICANT CHANGE UP
FLUBV AG NPH QL: SIGNIFICANT CHANGE UP
GLUCOSE SERPL-MCNC: 119 MG/DL — HIGH (ref 70–99)
GLUCOSE UR QL: NEGATIVE MG/DL — SIGNIFICANT CHANGE UP
HCT VFR BLD CALC: 36.6 % — LOW (ref 39–50)
HGB BLD-MCNC: 12.6 G/DL — LOW (ref 13–17)
IMM GRANULOCYTES NFR BLD AUTO: 0.3 % — SIGNIFICANT CHANGE UP (ref 0–0.9)
INR BLD: 1.75 RATIO — HIGH (ref 0.85–1.18)
KETONES UR-MCNC: NEGATIVE MG/DL — SIGNIFICANT CHANGE UP
LACTATE SERPL-SCNC: 1.5 MMOL/L — SIGNIFICANT CHANGE UP (ref 0.7–2)
LEUKOCYTE ESTERASE UR-ACNC: ABNORMAL
LYMPHOCYTES # BLD AUTO: 0.6 K/UL — LOW (ref 1–3.3)
LYMPHOCYTES # BLD AUTO: 6.3 % — LOW (ref 13–44)
MCHC RBC-ENTMCNC: 32.8 PG — SIGNIFICANT CHANGE UP (ref 27–34)
MCHC RBC-ENTMCNC: 34.4 GM/DL — SIGNIFICANT CHANGE UP (ref 32–36)
MCV RBC AUTO: 95.3 FL — SIGNIFICANT CHANGE UP (ref 80–100)
MONOCYTES # BLD AUTO: 0.6 K/UL — SIGNIFICANT CHANGE UP (ref 0–0.9)
MONOCYTES NFR BLD AUTO: 6.3 % — SIGNIFICANT CHANGE UP (ref 2–14)
NEUTROPHILS # BLD AUTO: 8.24 K/UL — HIGH (ref 1.8–7.4)
NEUTROPHILS NFR BLD AUTO: 86.6 % — HIGH (ref 43–77)
NITRITE UR-MCNC: POSITIVE
PH UR: 5.5 — SIGNIFICANT CHANGE UP (ref 5–8)
PLATELET # BLD AUTO: 279 K/UL — SIGNIFICANT CHANGE UP (ref 150–400)
POTASSIUM SERPL-MCNC: 3.7 MMOL/L — SIGNIFICANT CHANGE UP (ref 3.5–5.3)
POTASSIUM SERPL-SCNC: 3.7 MMOL/L — SIGNIFICANT CHANGE UP (ref 3.5–5.3)
PROT SERPL-MCNC: 6.2 GM/DL — SIGNIFICANT CHANGE UP (ref 6–8.3)
PROT SERPL-MCNC: 7.2 GM/DL — SIGNIFICANT CHANGE UP (ref 6–8.3)
PROT UR-MCNC: 30 MG/DL
PROTHROM AB SERPL-ACNC: 19.5 SEC — HIGH (ref 9.5–13)
RBC # BLD: 3.84 M/UL — LOW (ref 4.2–5.8)
RBC # FLD: 14.8 % — HIGH (ref 10.3–14.5)
RBC CASTS # UR COMP ASSIST: 6 /HPF — HIGH (ref 0–4)
RSV RNA NPH QL NAA+NON-PROBE: SIGNIFICANT CHANGE UP
SARS-COV-2 RNA SPEC QL NAA+PROBE: SIGNIFICANT CHANGE UP
SODIUM SERPL-SCNC: 132 MMOL/L — LOW (ref 135–145)
SP GR SPEC: >1.03 — HIGH (ref 1–1.03)
SQUAMOUS # UR AUTO: 3 /HPF — SIGNIFICANT CHANGE UP (ref 0–5)
UROBILINOGEN FLD QL: 1 MG/DL — SIGNIFICANT CHANGE UP (ref 0.2–1)
WBC # BLD: 9.52 K/UL — SIGNIFICANT CHANGE UP (ref 3.8–10.5)
WBC # FLD AUTO: 9.52 K/UL — SIGNIFICANT CHANGE UP (ref 3.8–10.5)
WBC UR QL: 0 /HPF — SIGNIFICANT CHANGE UP (ref 0–5)

## 2024-08-01 PROCEDURE — 87040 BLOOD CULTURE FOR BACTERIA: CPT

## 2024-08-01 PROCEDURE — 71045 X-RAY EXAM CHEST 1 VIEW: CPT

## 2024-08-01 PROCEDURE — 86140 C-REACTIVE PROTEIN: CPT

## 2024-08-01 PROCEDURE — C1729: CPT

## 2024-08-01 PROCEDURE — 88108 CYTOPATH CONCENTRATE TECH: CPT

## 2024-08-01 PROCEDURE — 82728 ASSAY OF FERRITIN: CPT

## 2024-08-01 PROCEDURE — 85610 PROTHROMBIN TIME: CPT

## 2024-08-01 PROCEDURE — 74177 CT ABD & PELVIS W/CONTRAST: CPT | Mod: MC

## 2024-08-01 PROCEDURE — 84157 ASSAY OF PROTEIN OTHER: CPT

## 2024-08-01 PROCEDURE — 71045 X-RAY EXAM CHEST 1 VIEW: CPT | Mod: 26

## 2024-08-01 PROCEDURE — 83550 IRON BINDING TEST: CPT

## 2024-08-01 PROCEDURE — 93005 ELECTROCARDIOGRAM TRACING: CPT

## 2024-08-01 PROCEDURE — 94760 N-INVAS EAR/PLS OXIMETRY 1: CPT

## 2024-08-01 PROCEDURE — 93010 ELECTROCARDIOGRAM REPORT: CPT

## 2024-08-01 PROCEDURE — 80048 BASIC METABOLIC PNL TOTAL CA: CPT

## 2024-08-01 PROCEDURE — 74183 MRI ABD W/O CNTR FLWD CNTR: CPT | Mod: MC

## 2024-08-01 PROCEDURE — 83735 ASSAY OF MAGNESIUM: CPT

## 2024-08-01 PROCEDURE — 88305 TISSUE EXAM BY PATHOLOGIST: CPT

## 2024-08-01 PROCEDURE — 82150 ASSAY OF AMYLASE: CPT

## 2024-08-01 PROCEDURE — 36415 COLL VENOUS BLD VENIPUNCTURE: CPT

## 2024-08-01 PROCEDURE — 85730 THROMBOPLASTIN TIME PARTIAL: CPT

## 2024-08-01 PROCEDURE — 87015 SPECIMEN INFECT AGNT CONCNTJ: CPT

## 2024-08-01 PROCEDURE — 85027 COMPLETE CBC AUTOMATED: CPT

## 2024-08-01 PROCEDURE — 89051 BODY FLUID CELL COUNT: CPT

## 2024-08-01 PROCEDURE — 84100 ASSAY OF PHOSPHORUS: CPT

## 2024-08-01 PROCEDURE — 84484 ASSAY OF TROPONIN QUANT: CPT

## 2024-08-01 PROCEDURE — 87075 CULTR BACTERIA EXCEPT BLOOD: CPT

## 2024-08-01 PROCEDURE — 49406 IMAGE CATH FLUID PERI/RETRO: CPT

## 2024-08-01 PROCEDURE — 83930 ASSAY OF BLOOD OSMOLALITY: CPT

## 2024-08-01 PROCEDURE — 82248 BILIRUBIN DIRECT: CPT

## 2024-08-01 PROCEDURE — 49423 EXCHANGE DRAINAGE CATHETER: CPT

## 2024-08-01 PROCEDURE — 74177 CT ABD & PELVIS W/CONTRAST: CPT | Mod: 26,MC

## 2024-08-01 PROCEDURE — 82607 VITAMIN B-12: CPT

## 2024-08-01 PROCEDURE — 87070 CULTURE OTHR SPECIMN AEROBIC: CPT

## 2024-08-01 PROCEDURE — 82042 OTHER SOURCE ALBUMIN QUAN EA: CPT

## 2024-08-01 PROCEDURE — 80076 HEPATIC FUNCTION PANEL: CPT

## 2024-08-01 PROCEDURE — A9579: CPT

## 2024-08-01 PROCEDURE — 87077 CULTURE AEROBIC IDENTIFY: CPT

## 2024-08-01 PROCEDURE — 99223 1ST HOSP IP/OBS HIGH 75: CPT

## 2024-08-01 PROCEDURE — 82239 BILE ACIDS TOTAL: CPT

## 2024-08-01 PROCEDURE — 80053 COMPREHEN METABOLIC PANEL: CPT

## 2024-08-01 PROCEDURE — 83540 ASSAY OF IRON: CPT

## 2024-08-01 PROCEDURE — 82746 ASSAY OF FOLIC ACID SERUM: CPT

## 2024-08-01 PROCEDURE — 76080 X-RAY EXAM OF FISTULA: CPT

## 2024-08-01 PROCEDURE — 49083 ABD PARACENTESIS W/IMAGING: CPT

## 2024-08-01 PROCEDURE — 82140 ASSAY OF AMMONIA: CPT

## 2024-08-01 PROCEDURE — 87150 DNA/RNA AMPLIFIED PROBE: CPT

## 2024-08-01 PROCEDURE — 75984 XRAY CONTROL CATHETER CHANGE: CPT

## 2024-08-01 PROCEDURE — 83880 ASSAY OF NATRIURETIC PEPTIDE: CPT

## 2024-08-01 PROCEDURE — 83690 ASSAY OF LIPASE: CPT

## 2024-08-01 PROCEDURE — 93306 TTE W/DOPPLER COMPLETE: CPT

## 2024-08-01 PROCEDURE — 82945 GLUCOSE OTHER FLUID: CPT

## 2024-08-01 PROCEDURE — 85025 COMPLETE CBC W/AUTO DIFF WBC: CPT

## 2024-08-01 PROCEDURE — C1769: CPT

## 2024-08-01 PROCEDURE — 71250 CT THORAX DX C-: CPT | Mod: MC

## 2024-08-01 PROCEDURE — 99285 EMERGENCY DEPT VISIT HI MDM: CPT

## 2024-08-01 PROCEDURE — 83605 ASSAY OF LACTIC ACID: CPT

## 2024-08-01 PROCEDURE — 87102 FUNGUS ISOLATION CULTURE: CPT

## 2024-08-01 PROCEDURE — 49424 ASSESS CYST CONTRAST INJECT: CPT

## 2024-08-01 PROCEDURE — 84145 PROCALCITONIN (PCT): CPT

## 2024-08-01 PROCEDURE — 87186 SC STD MICRODIL/AGAR DIL: CPT

## 2024-08-01 PROCEDURE — P9047: CPT

## 2024-08-01 PROCEDURE — 83615 LACTATE (LD) (LDH) ENZYME: CPT

## 2024-08-01 RX ORDER — ACETAMINOPHEN 500 MG/5ML
650 LIQUID (ML) ORAL EVERY 6 HOURS
Refills: 0 | Status: DISCONTINUED | OUTPATIENT
Start: 2024-08-01 | End: 2024-08-17

## 2024-08-01 RX ORDER — MAGNESIUM, ALUMINUM HYDROXIDE 200-200 MG
30 TABLET,CHEWABLE ORAL EVERY 4 HOURS
Refills: 0 | Status: DISCONTINUED | OUTPATIENT
Start: 2024-08-01 | End: 2024-08-17

## 2024-08-01 RX ORDER — POLYETHYLENE GLYCOL 3350 17 G/17G
17 POWDER, FOR SOLUTION ORAL DAILY
Refills: 0 | Status: DISCONTINUED | OUTPATIENT
Start: 2024-08-01 | End: 2024-08-17

## 2024-08-01 RX ORDER — PIPERACILLIN-TAZO-DEXTROSE,ISO 3.375G/5
3.38 IV SOLUTION, PIGGYBACK PREMIX FROZEN(ML) INTRAVENOUS ONCE
Refills: 0 | Status: COMPLETED | OUTPATIENT
Start: 2024-08-01 | End: 2024-08-01

## 2024-08-01 RX ORDER — BISACODYL 5 MG
5 TABLET, DELAYED RELEASE (ENTERIC COATED) ORAL DAILY
Refills: 0 | Status: DISCONTINUED | OUTPATIENT
Start: 2024-08-01 | End: 2024-08-17

## 2024-08-01 RX ORDER — SENNA 187 MG
2 TABLET ORAL AT BEDTIME
Refills: 0 | Status: DISCONTINUED | OUTPATIENT
Start: 2024-08-01 | End: 2024-08-17

## 2024-08-01 RX ORDER — MELATONIN 5 MG
3 TABLET ORAL AT BEDTIME
Refills: 0 | Status: DISCONTINUED | OUTPATIENT
Start: 2024-08-01 | End: 2024-08-17

## 2024-08-01 RX ORDER — ONDANSETRON HCL/PF 4 MG/2 ML
4 VIAL (ML) INJECTION ONCE
Refills: 0 | Status: COMPLETED | OUTPATIENT
Start: 2024-08-01 | End: 2024-08-01

## 2024-08-01 RX ORDER — ACETAMINOPHEN 500 MG/5ML
650 LIQUID (ML) ORAL ONCE
Refills: 0 | Status: COMPLETED | OUTPATIENT
Start: 2024-08-01 | End: 2024-08-01

## 2024-08-01 RX ORDER — ONDANSETRON HCL/PF 4 MG/2 ML
4 VIAL (ML) INJECTION EVERY 6 HOURS
Refills: 0 | Status: DISCONTINUED | OUTPATIENT
Start: 2024-08-01 | End: 2024-08-17

## 2024-08-01 RX ORDER — HYDROMORPHONE/SOD CHLOR,ISO/PF 2 MG/10 ML
0.5 SYRINGE (ML) INJECTION EVERY 4 HOURS
Refills: 0 | Status: DISCONTINUED | OUTPATIENT
Start: 2024-08-01 | End: 2024-08-08

## 2024-08-01 RX ORDER — HEPARIN SODIUM 1000 [USP'U]/ML
5000 INJECTION INTRAVENOUS; SUBCUTANEOUS EVERY 8 HOURS
Refills: 0 | Status: DISCONTINUED | OUTPATIENT
Start: 2024-08-01 | End: 2024-08-02

## 2024-08-01 RX ORDER — SODIUM CHLORIDE 9 G/1000ML
1000 INJECTION, SOLUTION INTRAVENOUS
Refills: 0 | Status: DISCONTINUED | OUTPATIENT
Start: 2024-08-01 | End: 2024-08-03

## 2024-08-01 RX ORDER — NALOXONE HYDROCHLORIDE 0.4 MG/ML
0.4 INJECTION, SOLUTION INTRAMUSCULAR; INTRAVENOUS; SUBCUTANEOUS ONCE
Refills: 0 | Status: DISCONTINUED | OUTPATIENT
Start: 2024-08-01 | End: 2024-08-17

## 2024-08-01 RX ORDER — HYDROMORPHONE/SOD CHLOR,ISO/PF 2 MG/10 ML
0.2 SYRINGE (ML) INJECTION EVERY 4 HOURS
Refills: 0 | Status: DISCONTINUED | OUTPATIENT
Start: 2024-08-01 | End: 2024-08-03

## 2024-08-01 RX ORDER — IBUPROFEN 200 MG
600 TABLET ORAL ONCE
Refills: 0 | Status: COMPLETED | OUTPATIENT
Start: 2024-08-01 | End: 2024-08-01

## 2024-08-01 RX ADMIN — Medication 4 MILLIGRAM(S): at 19:57

## 2024-08-01 RX ADMIN — Medication 4 MILLIGRAM(S): at 15:46

## 2024-08-01 RX ADMIN — Medication 600 MILLIGRAM(S): at 16:34

## 2024-08-01 RX ADMIN — Medication 200 GRAM(S): at 19:57

## 2024-08-01 RX ADMIN — Medication 2500 MILLILITER(S): at 15:46

## 2024-08-01 RX ADMIN — Medication 650 MILLIGRAM(S): at 19:57

## 2024-08-02 DIAGNOSIS — K83.1 OBSTRUCTION OF BILE DUCT: ICD-10-CM

## 2024-08-02 DIAGNOSIS — C18.9 MALIGNANT NEOPLASM OF COLON, UNSPECIFIED: ICD-10-CM

## 2024-08-02 LAB
ALBUMIN SERPL ELPH-MCNC: 2 G/DL — LOW (ref 3.3–5)
ALP SERPL-CCNC: 235 U/L — HIGH (ref 40–120)
ALT FLD-CCNC: 46 U/L — SIGNIFICANT CHANGE UP (ref 12–78)
ANION GAP SERPL CALC-SCNC: 6 MMOL/L — SIGNIFICANT CHANGE UP (ref 5–17)
AST SERPL-CCNC: 133 U/L — HIGH (ref 15–37)
BASOPHILS # BLD AUTO: 0 K/UL — SIGNIFICANT CHANGE UP (ref 0–0.2)
BASOPHILS NFR BLD AUTO: 0 % — SIGNIFICANT CHANGE UP (ref 0–2)
BILIRUB SERPL-MCNC: 12.9 MG/DL — HIGH (ref 0.2–1.2)
BUN SERPL-MCNC: 12 MG/DL — SIGNIFICANT CHANGE UP (ref 7–23)
CALCIUM SERPL-MCNC: 8 MG/DL — LOW (ref 8.5–10.1)
CHLORIDE SERPL-SCNC: 105 MMOL/L — SIGNIFICANT CHANGE UP (ref 96–108)
CMV DNA CSF QL NAA+PROBE: SIGNIFICANT CHANGE UP IU/ML
CMV DNA SPEC NAA+PROBE-LOG#: SIGNIFICANT CHANGE UP LOG10IU/ML
CO2 SERPL-SCNC: 24 MMOL/L — SIGNIFICANT CHANGE UP (ref 22–31)
CREAT SERPL-MCNC: 0.76 MG/DL — SIGNIFICANT CHANGE UP (ref 0.5–1.3)
EGFR: 110 ML/MIN/1.73M2 — SIGNIFICANT CHANGE UP
EGFR: 110 ML/MIN/1.73M2 — SIGNIFICANT CHANGE UP
EOSINOPHIL # BLD AUTO: 0 K/UL — SIGNIFICANT CHANGE UP (ref 0–0.5)
EOSINOPHIL NFR BLD AUTO: 0 % — SIGNIFICANT CHANGE UP (ref 0–6)
GLUCOSE SERPL-MCNC: 111 MG/DL — HIGH (ref 70–99)
GRAM STN FLD: ABNORMAL
HCT VFR BLD CALC: 33 % — LOW (ref 39–50)
HGB BLD-MCNC: 10.9 G/DL — LOW (ref 13–17)
K OXYTOCA DNA BLD POS QL NAA+NON-PROBE: SIGNIFICANT CHANGE UP
LYMPHOCYTES # BLD AUTO: 0.88 K/UL — LOW (ref 1–3.3)
LYMPHOCYTES # BLD AUTO: 13 % — SIGNIFICANT CHANGE UP (ref 13–44)
MAGNESIUM SERPL-MCNC: 1 MG/DL — CRITICAL LOW (ref 1.6–2.6)
MANUAL SMEAR VERIFICATION: SIGNIFICANT CHANGE UP
MCHC RBC-ENTMCNC: 31.5 PG — SIGNIFICANT CHANGE UP (ref 27–34)
MCHC RBC-ENTMCNC: 33 GM/DL — SIGNIFICANT CHANGE UP (ref 32–36)
MCV RBC AUTO: 95.4 FL — SIGNIFICANT CHANGE UP (ref 80–100)
METHOD TYPE: SIGNIFICANT CHANGE UP
MONOCYTES # BLD AUTO: 0.61 K/UL — SIGNIFICANT CHANGE UP (ref 0–0.9)
MONOCYTES NFR BLD AUTO: 9 % — SIGNIFICANT CHANGE UP (ref 2–14)
NEUTROPHILS # BLD AUTO: 5.23 K/UL — SIGNIFICANT CHANGE UP (ref 1.8–7.4)
NEUTROPHILS NFR BLD AUTO: 74 % — SIGNIFICANT CHANGE UP (ref 43–77)
NEUTS BAND # BLD: 3 % — SIGNIFICANT CHANGE UP (ref 0–8)
NEUTS BAND NFR BLD: 3 % — SIGNIFICANT CHANGE UP (ref 0–8)
NRBC # BLD: 0 /100 WBCS — SIGNIFICANT CHANGE UP (ref 0–0)
NRBC # BLD: SIGNIFICANT CHANGE UP /100 WBCS (ref 0–0)
NRBC BLD-RTO: 0 /100 WBCS — SIGNIFICANT CHANGE UP (ref 0–0)
NRBC BLD-RTO: SIGNIFICANT CHANGE UP /100 WBCS (ref 0–0)
PHOSPHATE SERPL-MCNC: 2.9 MG/DL — SIGNIFICANT CHANGE UP (ref 2.5–4.5)
PLAT MORPH BLD: NORMAL — SIGNIFICANT CHANGE UP
PLATELET # BLD AUTO: 176 K/UL — SIGNIFICANT CHANGE UP (ref 150–400)
POTASSIUM SERPL-MCNC: 4 MMOL/L — SIGNIFICANT CHANGE UP (ref 3.5–5.3)
POTASSIUM SERPL-SCNC: 4 MMOL/L — SIGNIFICANT CHANGE UP (ref 3.5–5.3)
PROT SERPL-MCNC: 6.4 GM/DL — SIGNIFICANT CHANGE UP (ref 6–8.3)
RBC # BLD: 3.46 M/UL — LOW (ref 4.2–5.8)
RBC # FLD: 15.4 % — HIGH (ref 10.3–14.5)
RBC BLD AUTO: NORMAL — SIGNIFICANT CHANGE UP
SODIUM SERPL-SCNC: 135 MMOL/L — SIGNIFICANT CHANGE UP (ref 135–145)
SPECIMEN SOURCE: SIGNIFICANT CHANGE UP
SPECIMEN SOURCE: SIGNIFICANT CHANGE UP
VARIANT LYMPHS # BLD: 1 % — SIGNIFICANT CHANGE UP (ref 0–6)
VARIANT LYMPHS NFR BLD MANUAL: 1 % — SIGNIFICANT CHANGE UP (ref 0–6)
WBC # BLD: 6.79 K/UL — SIGNIFICANT CHANGE UP (ref 3.8–10.5)
WBC # FLD AUTO: 6.79 K/UL — SIGNIFICANT CHANGE UP (ref 3.8–10.5)

## 2024-08-02 PROCEDURE — 99222 1ST HOSP IP/OBS MODERATE 55: CPT

## 2024-08-02 PROCEDURE — 99233 SBSQ HOSP IP/OBS HIGH 50: CPT

## 2024-08-02 RX ORDER — VANCOMYCIN HCL IN 5 % DEXTROSE 1.5G/250ML
PLASTIC BAG, INJECTION (ML) INTRAVENOUS
Refills: 0 | Status: DISCONTINUED | OUTPATIENT
Start: 2024-08-02 | End: 2024-08-02

## 2024-08-02 RX ORDER — VANCOMYCIN HCL IN 5 % DEXTROSE 1.5G/250ML
1750 PLASTIC BAG, INJECTION (ML) INTRAVENOUS ONCE
Refills: 0 | Status: COMPLETED | OUTPATIENT
Start: 2024-08-02 | End: 2024-08-02

## 2024-08-02 RX ORDER — VANCOMYCIN HCL IN 5 % DEXTROSE 1.5G/250ML
1750 PLASTIC BAG, INJECTION (ML) INTRAVENOUS EVERY 12 HOURS
Refills: 0 | Status: DISCONTINUED | OUTPATIENT
Start: 2024-08-02 | End: 2024-08-02

## 2024-08-02 RX ORDER — ENOXAPARIN SODIUM 100 MG/ML
40 INJECTION SUBCUTANEOUS EVERY 24 HOURS
Refills: 0 | Status: DISCONTINUED | OUTPATIENT
Start: 2024-08-02 | End: 2024-08-04

## 2024-08-02 RX ORDER — MAGNESIUM SULFATE 500 MG/ML
2 SYRINGE (ML) INJECTION ONCE
Refills: 0 | Status: COMPLETED | OUTPATIENT
Start: 2024-08-02 | End: 2024-08-02

## 2024-08-02 RX ORDER — PIPERACILLIN-TAZO-DEXTROSE,ISO 3.375G/5
3.38 IV SOLUTION, PIGGYBACK PREMIX FROZEN(ML) INTRAVENOUS EVERY 8 HOURS
Refills: 0 | Status: DISCONTINUED | OUTPATIENT
Start: 2024-08-02 | End: 2024-08-03

## 2024-08-02 RX ADMIN — Medication 650 MILLIGRAM(S): at 19:17

## 2024-08-02 RX ADMIN — Medication 0.5 MILLIGRAM(S): at 18:47

## 2024-08-02 RX ADMIN — Medication 0.5 MILLIGRAM(S): at 19:17

## 2024-08-02 RX ADMIN — POLYETHYLENE GLYCOL 3350 17 GRAM(S): 17 POWDER, FOR SOLUTION ORAL at 09:54

## 2024-08-02 RX ADMIN — Medication 3 MILLIGRAM(S): at 22:17

## 2024-08-02 RX ADMIN — SODIUM CHLORIDE 100 MILLILITER(S): 9 INJECTION, SOLUTION INTRAVENOUS at 02:38

## 2024-08-02 RX ADMIN — Medication 650 MILLIGRAM(S): at 18:47

## 2024-08-02 RX ADMIN — Medication 25 GRAM(S): at 22:10

## 2024-08-02 RX ADMIN — Medication 0.5 MILLIGRAM(S): at 05:46

## 2024-08-02 RX ADMIN — Medication 0.5 MILLIGRAM(S): at 13:09

## 2024-08-02 RX ADMIN — Medication 25 GRAM(S): at 05:40

## 2024-08-02 RX ADMIN — SODIUM CHLORIDE 100 MILLILITER(S): 9 INJECTION, SOLUTION INTRAVENOUS at 10:04

## 2024-08-02 RX ADMIN — ENOXAPARIN SODIUM 40 MILLIGRAM(S): 100 INJECTION SUBCUTANEOUS at 09:54

## 2024-08-02 RX ADMIN — Medication 250 MILLILITER(S): at 19:06

## 2024-08-02 RX ADMIN — Medication 2 TABLET(S): at 22:09

## 2024-08-02 RX ADMIN — Medication 25 GRAM(S): at 09:54

## 2024-08-02 RX ADMIN — Medication 250 MILLIGRAM(S): at 02:37

## 2024-08-02 RX ADMIN — Medication 25 GRAM(S): at 15:00

## 2024-08-03 LAB
-  AMPICILLIN/SULBACTAM: SIGNIFICANT CHANGE UP
-  AMPICILLIN: SIGNIFICANT CHANGE UP
-  AZTREONAM: SIGNIFICANT CHANGE UP
-  CEFAZOLIN: SIGNIFICANT CHANGE UP
-  CEFEPIME: SIGNIFICANT CHANGE UP
-  CEFOXITIN: SIGNIFICANT CHANGE UP
-  CEFTRIAXONE: SIGNIFICANT CHANGE UP
-  CIPROFLOXACIN: SIGNIFICANT CHANGE UP
-  ERTAPENEM: SIGNIFICANT CHANGE UP
-  GENTAMICIN: SIGNIFICANT CHANGE UP
-  IMIPENEM: SIGNIFICANT CHANGE UP
-  LEVOFLOXACIN: SIGNIFICANT CHANGE UP
-  MEROPENEM: SIGNIFICANT CHANGE UP
-  PIPERACILLIN/TAZOBACTAM: SIGNIFICANT CHANGE UP
-  TOBRAMYCIN: SIGNIFICANT CHANGE UP
-  TRIMETHOPRIM/SULFAMETHOXAZOLE: SIGNIFICANT CHANGE UP
ALBUMIN SERPL ELPH-MCNC: 1.9 G/DL — LOW (ref 3.3–5)
ALP SERPL-CCNC: 249 U/L — HIGH (ref 40–120)
ALT FLD-CCNC: 39 U/L — SIGNIFICANT CHANGE UP (ref 12–78)
ANION GAP SERPL CALC-SCNC: 6 MMOL/L — SIGNIFICANT CHANGE UP (ref 5–17)
AST SERPL-CCNC: 136 U/L — HIGH (ref 15–37)
BASOPHILS # BLD AUTO: 0.01 K/UL — SIGNIFICANT CHANGE UP (ref 0–0.2)
BASOPHILS NFR BLD AUTO: 0.2 % — SIGNIFICANT CHANGE UP (ref 0–2)
BILIRUB DIRECT SERPL-MCNC: 10.2 MG/DL — HIGH (ref 0–0.3)
BILIRUB INDIRECT FLD-MCNC: 2.3 MG/DL — HIGH (ref 0.2–1)
BILIRUB SERPL-MCNC: 12.5 MG/DL — HIGH (ref 0.2–1.2)
BUN SERPL-MCNC: 8 MG/DL — SIGNIFICANT CHANGE UP (ref 7–23)
CALCIUM SERPL-MCNC: 8.3 MG/DL — LOW (ref 8.5–10.1)
CHLORIDE SERPL-SCNC: 104 MMOL/L — SIGNIFICANT CHANGE UP (ref 96–108)
CO2 SERPL-SCNC: 24 MMOL/L — SIGNIFICANT CHANGE UP (ref 22–31)
CREAT SERPL-MCNC: 0.47 MG/DL — LOW (ref 0.5–1.3)
EGFR: 127 ML/MIN/1.73M2 — SIGNIFICANT CHANGE UP
EGFR: 127 ML/MIN/1.73M2 — SIGNIFICANT CHANGE UP
EOSINOPHIL # BLD AUTO: 0.09 K/UL — SIGNIFICANT CHANGE UP (ref 0–0.5)
EOSINOPHIL NFR BLD AUTO: 1.7 % — SIGNIFICANT CHANGE UP (ref 0–6)
GLUCOSE SERPL-MCNC: 96 MG/DL — SIGNIFICANT CHANGE UP (ref 70–99)
HCT VFR BLD CALC: 32.7 % — LOW (ref 39–50)
HGB BLD-MCNC: 11 G/DL — LOW (ref 13–17)
IMM GRANULOCYTES NFR BLD AUTO: 0.6 % — SIGNIFICANT CHANGE UP (ref 0–0.9)
LYMPHOCYTES # BLD AUTO: 0.83 K/UL — LOW (ref 1–3.3)
LYMPHOCYTES # BLD AUTO: 15.4 % — SIGNIFICANT CHANGE UP (ref 13–44)
MAGNESIUM SERPL-MCNC: 1.3 MG/DL — LOW (ref 1.6–2.6)
MCHC RBC-ENTMCNC: 32.2 PG — SIGNIFICANT CHANGE UP (ref 27–34)
MCHC RBC-ENTMCNC: 33.6 GM/DL — SIGNIFICANT CHANGE UP (ref 32–36)
MCV RBC AUTO: 95.6 FL — SIGNIFICANT CHANGE UP (ref 80–100)
METHOD TYPE: SIGNIFICANT CHANGE UP
MONOCYTES # BLD AUTO: 0.66 K/UL — SIGNIFICANT CHANGE UP (ref 0–0.9)
MONOCYTES NFR BLD AUTO: 12.2 % — SIGNIFICANT CHANGE UP (ref 2–14)
NEUTROPHILS # BLD AUTO: 3.78 K/UL — SIGNIFICANT CHANGE UP (ref 1.8–7.4)
NEUTROPHILS NFR BLD AUTO: 69.9 % — SIGNIFICANT CHANGE UP (ref 43–77)
PHOSPHATE SERPL-MCNC: 1.6 MG/DL — LOW (ref 2.5–4.5)
PLATELET # BLD AUTO: 173 K/UL — SIGNIFICANT CHANGE UP (ref 150–400)
POTASSIUM SERPL-MCNC: 3.5 MMOL/L — SIGNIFICANT CHANGE UP (ref 3.5–5.3)
POTASSIUM SERPL-SCNC: 3.5 MMOL/L — SIGNIFICANT CHANGE UP (ref 3.5–5.3)
PROT SERPL-MCNC: 6.1 GM/DL — SIGNIFICANT CHANGE UP (ref 6–8.3)
RBC # BLD: 3.42 M/UL — LOW (ref 4.2–5.8)
RBC # FLD: 15.3 % — HIGH (ref 10.3–14.5)
SODIUM SERPL-SCNC: 134 MMOL/L — LOW (ref 135–145)
WBC # BLD: 5.4 K/UL — SIGNIFICANT CHANGE UP (ref 3.8–10.5)
WBC # FLD AUTO: 5.4 K/UL — SIGNIFICANT CHANGE UP (ref 3.8–10.5)

## 2024-08-03 PROCEDURE — 99233 SBSQ HOSP IP/OBS HIGH 50: CPT

## 2024-08-03 RX ORDER — CEFTRIAXONE 500 MG/1
2000 INJECTION, POWDER, FOR SOLUTION INTRAMUSCULAR; INTRAVENOUS EVERY 24 HOURS
Refills: 0 | Status: DISCONTINUED | OUTPATIENT
Start: 2024-08-03 | End: 2024-08-05

## 2024-08-03 RX ORDER — SODIUM PHOSPHATE,DIBASIC DIHYD
15 POWDER (GRAM) MISCELLANEOUS ONCE
Refills: 0 | Status: COMPLETED | OUTPATIENT
Start: 2024-08-03 | End: 2024-08-03

## 2024-08-03 RX ORDER — HYDROMORPHONE/SOD CHLOR,ISO/PF 2 MG/10 ML
2 SYRINGE (ML) INJECTION EVERY 4 HOURS
Refills: 0 | Status: DISCONTINUED | OUTPATIENT
Start: 2024-08-03 | End: 2024-08-10

## 2024-08-03 RX ORDER — CEFTRIAXONE 500 MG/1
2000 INJECTION, POWDER, FOR SOLUTION INTRAMUSCULAR; INTRAVENOUS EVERY 24 HOURS
Refills: 0 | Status: DISCONTINUED | OUTPATIENT
Start: 2024-08-03 | End: 2024-08-03

## 2024-08-03 RX ORDER — METRONIDAZOLE 250 MG
500 TABLET ORAL THREE TIMES A DAY
Refills: 0 | Status: DISCONTINUED | OUTPATIENT
Start: 2024-08-03 | End: 2024-08-05

## 2024-08-03 RX ORDER — URSODIOL 300 MG/1
300 CAPSULE ORAL EVERY 12 HOURS
Refills: 0 | Status: DISCONTINUED | OUTPATIENT
Start: 2024-08-03 | End: 2024-08-17

## 2024-08-03 RX ORDER — SODIUM CHLORIDE 9 G/1000ML
1000 INJECTION, SOLUTION INTRAVENOUS
Refills: 0 | Status: DISCONTINUED | OUTPATIENT
Start: 2024-08-03 | End: 2024-08-04

## 2024-08-03 RX ORDER — MAGNESIUM SULFATE 500 MG/ML
1 SYRINGE (ML) INJECTION EVERY 6 HOURS
Refills: 0 | Status: COMPLETED | OUTPATIENT
Start: 2024-08-03 | End: 2024-08-03

## 2024-08-03 RX ADMIN — Medication 0.5 MILLIGRAM(S): at 01:33

## 2024-08-03 RX ADMIN — SODIUM CHLORIDE 100 MILLILITER(S): 9 INJECTION, SOLUTION INTRAVENOUS at 22:18

## 2024-08-03 RX ADMIN — URSODIOL 300 MILLIGRAM(S): 300 CAPSULE ORAL at 21:16

## 2024-08-03 RX ADMIN — Medication 0.5 MILLIGRAM(S): at 14:20

## 2024-08-03 RX ADMIN — Medication 0.5 MILLIGRAM(S): at 20:07

## 2024-08-03 RX ADMIN — Medication 25 GRAM(S): at 05:18

## 2024-08-03 RX ADMIN — POLYETHYLENE GLYCOL 3350 17 GRAM(S): 17 POWDER, FOR SOLUTION ORAL at 10:11

## 2024-08-03 RX ADMIN — Medication 2000 UNIT(S): at 21:16

## 2024-08-03 RX ADMIN — Medication 100 GRAM(S): at 15:55

## 2024-08-03 RX ADMIN — CEFTRIAXONE 2000 MILLIGRAM(S): 500 INJECTION, POWDER, FOR SOLUTION INTRAMUSCULAR; INTRAVENOUS at 21:16

## 2024-08-03 RX ADMIN — Medication 62.5 MILLIMOLE(S): at 10:11

## 2024-08-03 RX ADMIN — Medication 0.5 MILLIGRAM(S): at 00:59

## 2024-08-03 RX ADMIN — SODIUM CHLORIDE 100 MILLILITER(S): 9 INJECTION, SOLUTION INTRAVENOUS at 10:11

## 2024-08-03 RX ADMIN — Medication 500 MILLIGRAM(S): at 21:16

## 2024-08-03 RX ADMIN — Medication 100 GRAM(S): at 21:15

## 2024-08-03 RX ADMIN — Medication 25 GRAM(S): at 13:46

## 2024-08-03 RX ADMIN — Medication 0.5 MILLIGRAM(S): at 10:40

## 2024-08-03 RX ADMIN — Medication 0.5 MILLIGRAM(S): at 10:13

## 2024-08-03 RX ADMIN — Medication 650 MILLIGRAM(S): at 21:15

## 2024-08-03 RX ADMIN — ENOXAPARIN SODIUM 40 MILLIGRAM(S): 100 INJECTION SUBCUTANEOUS at 10:11

## 2024-08-04 LAB
-  AMOXICILLIN/CLAVULANIC ACID: SIGNIFICANT CHANGE UP
-  AMPICILLIN/SULBACTAM: SIGNIFICANT CHANGE UP
-  AMPICILLIN: SIGNIFICANT CHANGE UP
-  AZTREONAM: SIGNIFICANT CHANGE UP
-  CEFAZOLIN: SIGNIFICANT CHANGE UP
-  CEFEPIME: SIGNIFICANT CHANGE UP
-  CEFOXITIN: SIGNIFICANT CHANGE UP
-  CEFTRIAXONE: SIGNIFICANT CHANGE UP
-  CIPROFLOXACIN: SIGNIFICANT CHANGE UP
-  ERTAPENEM: SIGNIFICANT CHANGE UP
-  GENTAMICIN: SIGNIFICANT CHANGE UP
-  IMIPENEM: SIGNIFICANT CHANGE UP
-  LEVOFLOXACIN: SIGNIFICANT CHANGE UP
-  MEROPENEM: SIGNIFICANT CHANGE UP
-  NITROFURANTOIN: SIGNIFICANT CHANGE UP
-  PIPERACILLIN/TAZOBACTAM: SIGNIFICANT CHANGE UP
-  TOBRAMYCIN: SIGNIFICANT CHANGE UP
-  TRIMETHOPRIM/SULFAMETHOXAZOLE: SIGNIFICANT CHANGE UP
ALBUMIN SERPL ELPH-MCNC: 1.9 G/DL — LOW (ref 3.3–5)
ALP SERPL-CCNC: 305 U/L — HIGH (ref 40–120)
ALT FLD-CCNC: 33 U/L — SIGNIFICANT CHANGE UP (ref 12–78)
AMMONIA BLD-MCNC: 67 UMOL/L — HIGH (ref 11–32)
AMYLASE P1 CFR SERPL: 23 U/L — LOW (ref 25–115)
ANION GAP SERPL CALC-SCNC: 7 MMOL/L — SIGNIFICANT CHANGE UP (ref 5–17)
AST SERPL-CCNC: 141 U/L — HIGH (ref 15–37)
BASOPHILS # BLD AUTO: 0.02 K/UL — SIGNIFICANT CHANGE UP (ref 0–0.2)
BASOPHILS NFR BLD AUTO: 0.4 % — SIGNIFICANT CHANGE UP (ref 0–2)
BILIRUB DIRECT SERPL-MCNC: 11.1 MG/DL — HIGH (ref 0–0.3)
BILIRUB INDIRECT FLD-MCNC: 2.9 MG/DL — HIGH (ref 0.2–1)
BILIRUB SERPL-MCNC: 14 MG/DL — HIGH (ref 0.2–1.2)
BUN SERPL-MCNC: 6 MG/DL — LOW (ref 7–23)
CALCIUM SERPL-MCNC: 7.8 MG/DL — LOW (ref 8.5–10.1)
CHLORIDE SERPL-SCNC: 102 MMOL/L — SIGNIFICANT CHANGE UP (ref 96–108)
CO2 SERPL-SCNC: 25 MMOL/L — SIGNIFICANT CHANGE UP (ref 22–31)
CREAT SERPL-MCNC: 0.54 MG/DL — SIGNIFICANT CHANGE UP (ref 0.5–1.3)
CRP SERPL-MCNC: 113 MG/L — HIGH
CULTURE RESULTS: ABNORMAL
EGFR: 121 ML/MIN/1.73M2 — SIGNIFICANT CHANGE UP
EGFR: 121 ML/MIN/1.73M2 — SIGNIFICANT CHANGE UP
EOSINOPHIL # BLD AUTO: 0.09 K/UL — SIGNIFICANT CHANGE UP (ref 0–0.5)
EOSINOPHIL NFR BLD AUTO: 1.6 % — SIGNIFICANT CHANGE UP (ref 0–6)
GLUCOSE SERPL-MCNC: 96 MG/DL — SIGNIFICANT CHANGE UP (ref 70–99)
HCT VFR BLD CALC: 31.6 % — LOW (ref 39–50)
HGB BLD-MCNC: 10.9 G/DL — LOW (ref 13–17)
IMM GRANULOCYTES NFR BLD AUTO: 0.4 % — SIGNIFICANT CHANGE UP (ref 0–0.9)
LACTATE SERPL-SCNC: 0.7 MMOL/L — SIGNIFICANT CHANGE UP (ref 0.7–2)
LIDOCAIN IGE QN: 22 U/L — SIGNIFICANT CHANGE UP (ref 13–75)
LYMPHOCYTES # BLD AUTO: 1.22 K/UL — SIGNIFICANT CHANGE UP (ref 1–3.3)
LYMPHOCYTES # BLD AUTO: 21.4 % — SIGNIFICANT CHANGE UP (ref 13–44)
MAGNESIUM SERPL-MCNC: 0.8 MG/DL — CRITICAL LOW (ref 1.6–2.6)
MCHC RBC-ENTMCNC: 32.3 PG — SIGNIFICANT CHANGE UP (ref 27–34)
MCHC RBC-ENTMCNC: 34.5 GM/DL — SIGNIFICANT CHANGE UP (ref 32–36)
MCV RBC AUTO: 93.8 FL — SIGNIFICANT CHANGE UP (ref 80–100)
METHOD TYPE: SIGNIFICANT CHANGE UP
MONOCYTES # BLD AUTO: 0.65 K/UL — SIGNIFICANT CHANGE UP (ref 0–0.9)
MONOCYTES NFR BLD AUTO: 11.4 % — SIGNIFICANT CHANGE UP (ref 2–14)
NEUTROPHILS # BLD AUTO: 3.71 K/UL — SIGNIFICANT CHANGE UP (ref 1.8–7.4)
NEUTROPHILS NFR BLD AUTO: 64.8 % — SIGNIFICANT CHANGE UP (ref 43–77)
ORGANISM # SPEC MICROSCOPIC CNT: ABNORMAL
ORGANISM # SPEC MICROSCOPIC CNT: SIGNIFICANT CHANGE UP
PHOSPHATE SERPL-MCNC: 1.7 MG/DL — LOW (ref 2.5–4.5)
PLATELET # BLD AUTO: 193 K/UL — SIGNIFICANT CHANGE UP (ref 150–400)
POTASSIUM SERPL-MCNC: 3.2 MMOL/L — LOW (ref 3.5–5.3)
POTASSIUM SERPL-SCNC: 3.2 MMOL/L — LOW (ref 3.5–5.3)
PROCALCITONIN SERPL-MCNC: 0.74 NG/ML — HIGH (ref 0.02–0.1)
PROT SERPL-MCNC: 6 GM/DL — SIGNIFICANT CHANGE UP (ref 6–8.3)
RBC # BLD: 3.37 M/UL — LOW (ref 4.2–5.8)
RBC # FLD: 15.3 % — HIGH (ref 10.3–14.5)
SODIUM SERPL-SCNC: 134 MMOL/L — LOW (ref 135–145)
SPECIMEN SOURCE: SIGNIFICANT CHANGE UP
WBC # BLD: 5.71 K/UL — SIGNIFICANT CHANGE UP (ref 3.8–10.5)
WBC # FLD AUTO: 5.71 K/UL — SIGNIFICANT CHANGE UP (ref 3.8–10.5)

## 2024-08-04 PROCEDURE — 74183 MRI ABD W/O CNTR FLWD CNTR: CPT | Mod: 26

## 2024-08-04 PROCEDURE — 99232 SBSQ HOSP IP/OBS MODERATE 35: CPT

## 2024-08-04 RX ORDER — POTASSIUM CHLORIDE, DEXTROSE MONOHYDRATE AND SODIUM CHLORIDE 150; 5; 900 MG/100ML; G/100ML; MG/100ML
1000 INJECTION, SOLUTION INTRAVENOUS
Refills: 0 | Status: DISCONTINUED | OUTPATIENT
Start: 2024-08-04 | End: 2024-08-05

## 2024-08-04 RX ORDER — ENOXAPARIN SODIUM 100 MG/ML
40 INJECTION SUBCUTANEOUS EVERY 24 HOURS
Refills: 0 | Status: DISCONTINUED | OUTPATIENT
Start: 2024-08-06 | End: 2024-08-17

## 2024-08-04 RX ORDER — MAGNESIUM SULFATE 500 MG/ML
1 SYRINGE (ML) INJECTION EVERY 6 HOURS
Refills: 0 | Status: COMPLETED | OUTPATIENT
Start: 2024-08-04 | End: 2024-08-04

## 2024-08-04 RX ORDER — POTASSIUM PHOSPHATE, MONOBASIC POTASSIUM PHOSPHATE, DIBASIC INJECTION, 236; 224 MG/ML; MG/ML
15 SOLUTION, CONCENTRATE INTRAVENOUS ONCE
Refills: 0 | Status: COMPLETED | OUTPATIENT
Start: 2024-08-04 | End: 2024-08-04

## 2024-08-04 RX ORDER — BENZONATATE 100 MG
100 CAPSULE ORAL EVERY 8 HOURS
Refills: 0 | Status: DISCONTINUED | OUTPATIENT
Start: 2024-08-04 | End: 2024-08-17

## 2024-08-04 RX ORDER — DEXTROMETHORPHAN HBR, GUAIFENESIN 200 MG/10ML
600 LIQUID ORAL EVERY 12 HOURS
Refills: 0 | Status: DISCONTINUED | OUTPATIENT
Start: 2024-08-04 | End: 2024-08-17

## 2024-08-04 RX ADMIN — DEXTROMETHORPHAN HBR, GUAIFENESIN 600 MILLIGRAM(S): 200 LIQUID ORAL at 22:24

## 2024-08-04 RX ADMIN — SODIUM CHLORIDE 100 MILLILITER(S): 9 INJECTION, SOLUTION INTRAVENOUS at 09:04

## 2024-08-04 RX ADMIN — Medication 2000 UNIT(S): at 20:47

## 2024-08-04 RX ADMIN — POTASSIUM PHOSPHATE, MONOBASIC POTASSIUM PHOSPHATE, DIBASIC INJECTION, 62.5 MILLIMOLE(S): 236; 224 SOLUTION, CONCENTRATE INTRAVENOUS at 10:51

## 2024-08-04 RX ADMIN — Medication 0.5 MILLIGRAM(S): at 21:33

## 2024-08-04 RX ADMIN — URSODIOL 300 MILLIGRAM(S): 300 CAPSULE ORAL at 20:48

## 2024-08-04 RX ADMIN — Medication 100 GRAM(S): at 22:25

## 2024-08-04 RX ADMIN — Medication 650 MILLIGRAM(S): at 16:08

## 2024-08-04 RX ADMIN — ENOXAPARIN SODIUM 40 MILLIGRAM(S): 100 INJECTION SUBCUTANEOUS at 09:24

## 2024-08-04 RX ADMIN — Medication 100 GRAM(S): at 16:10

## 2024-08-04 RX ADMIN — Medication 2 MILLIGRAM(S): at 09:23

## 2024-08-04 RX ADMIN — Medication 500 MILLIGRAM(S): at 20:48

## 2024-08-04 RX ADMIN — Medication 100 MILLIGRAM(S): at 19:06

## 2024-08-04 RX ADMIN — Medication 100 GRAM(S): at 10:59

## 2024-08-04 RX ADMIN — Medication 20 MILLIEQUIVALENT(S): at 16:11

## 2024-08-04 RX ADMIN — Medication 2 TABLET(S): at 20:49

## 2024-08-04 RX ADMIN — Medication 2 MILLIGRAM(S): at 10:00

## 2024-08-04 RX ADMIN — Medication 20 MILLIEQUIVALENT(S): at 13:26

## 2024-08-04 RX ADMIN — CEFTRIAXONE 2000 MILLIGRAM(S): 500 INJECTION, POWDER, FOR SOLUTION INTRAMUSCULAR; INTRAVENOUS at 20:47

## 2024-08-04 RX ADMIN — Medication 500 MILLIGRAM(S): at 13:28

## 2024-08-04 RX ADMIN — URSODIOL 300 MILLIGRAM(S): 300 CAPSULE ORAL at 09:23

## 2024-08-04 RX ADMIN — Medication 0.5 MILLIGRAM(S): at 21:03

## 2024-08-04 RX ADMIN — POLYETHYLENE GLYCOL 3350 17 GRAM(S): 17 POWDER, FOR SOLUTION ORAL at 09:23

## 2024-08-04 RX ADMIN — Medication 0.5 MILLIGRAM(S): at 16:06

## 2024-08-04 RX ADMIN — Medication 2.5 MILLIGRAM(S): at 18:01

## 2024-08-04 RX ADMIN — Medication 0.5 MILLIGRAM(S): at 03:23

## 2024-08-04 RX ADMIN — POTASSIUM CHLORIDE, DEXTROSE MONOHYDRATE AND SODIUM CHLORIDE 85 MILLILITER(S): 150; 5; 900 INJECTION, SOLUTION INTRAVENOUS at 13:27

## 2024-08-04 RX ADMIN — Medication 20 MILLIEQUIVALENT(S): at 10:54

## 2024-08-04 RX ADMIN — Medication 500 MILLIGRAM(S): at 05:27

## 2024-08-05 ENCOUNTER — TRANSCRIPTION ENCOUNTER (OUTPATIENT)
Age: 49
End: 2024-08-05

## 2024-08-05 LAB
-  AMIKACIN: SIGNIFICANT CHANGE UP
-  AZTREONAM: SIGNIFICANT CHANGE UP
-  CEFEPIME: SIGNIFICANT CHANGE UP
-  CEFTRIAXONE: SIGNIFICANT CHANGE UP
-  CIPROFLOXACIN: SIGNIFICANT CHANGE UP
-  GENTAMICIN: SIGNIFICANT CHANGE UP
-  LEVOFLOXACIN: SIGNIFICANT CHANGE UP
-  MEROPENEM: SIGNIFICANT CHANGE UP
-  PIPERACILLIN/TAZOBACTAM: SIGNIFICANT CHANGE UP
-  TOBRAMYCIN: SIGNIFICANT CHANGE UP
-  TRIMETHOPRIM/SULFAMETHOXAZOLE: SIGNIFICANT CHANGE UP
ALBUMIN SERPL ELPH-MCNC: 1.9 G/DL — LOW (ref 3.3–5)
ALP SERPL-CCNC: 259 U/L — HIGH (ref 40–120)
ALT FLD-CCNC: 28 U/L — SIGNIFICANT CHANGE UP (ref 12–78)
AMMONIA BLD-MCNC: 57 UMOL/L — HIGH (ref 11–32)
ANION GAP SERPL CALC-SCNC: 7 MMOL/L — SIGNIFICANT CHANGE UP (ref 5–17)
AST SERPL-CCNC: 122 U/L — HIGH (ref 15–37)
BILIRUB DIRECT SERPL-MCNC: 11.7 MG/DL — HIGH (ref 0–0.3)
BILIRUB INDIRECT FLD-MCNC: 3.2 MG/DL — HIGH (ref 0.2–1)
BILIRUB SERPL-MCNC: 14.9 MG/DL — HIGH (ref 0.2–1.2)
BUN SERPL-MCNC: 4 MG/DL — LOW (ref 7–23)
CALCIUM SERPL-MCNC: 7.9 MG/DL — LOW (ref 8.5–10.1)
CHLORIDE SERPL-SCNC: 106 MMOL/L — SIGNIFICANT CHANGE UP (ref 96–108)
CO2 SERPL-SCNC: 24 MMOL/L — SIGNIFICANT CHANGE UP (ref 22–31)
CREAT SERPL-MCNC: 0.47 MG/DL — LOW (ref 0.5–1.3)
CRP SERPL-MCNC: 110 MG/L — HIGH
CULTURE RESULTS: ABNORMAL
CULTURE RESULTS: ABNORMAL
EGFR: 127 ML/MIN/1.73M2 — SIGNIFICANT CHANGE UP
EGFR: 127 ML/MIN/1.73M2 — SIGNIFICANT CHANGE UP
GLUCOSE SERPL-MCNC: 96 MG/DL — SIGNIFICANT CHANGE UP (ref 70–99)
HCT VFR BLD CALC: 31.9 % — LOW (ref 39–50)
HGB BLD-MCNC: 10.8 G/DL — LOW (ref 13–17)
INR BLD: 1.72 RATIO — HIGH (ref 0.85–1.18)
MAGNESIUM SERPL-MCNC: 1.1 MG/DL — LOW (ref 1.6–2.6)
MCHC RBC-ENTMCNC: 31.8 PG — SIGNIFICANT CHANGE UP (ref 27–34)
MCHC RBC-ENTMCNC: 33.9 GM/DL — SIGNIFICANT CHANGE UP (ref 32–36)
MCV RBC AUTO: 93.8 FL — SIGNIFICANT CHANGE UP (ref 80–100)
METHOD TYPE: SIGNIFICANT CHANGE UP
ORGANISM # SPEC MICROSCOPIC CNT: ABNORMAL
ORGANISM # SPEC MICROSCOPIC CNT: SIGNIFICANT CHANGE UP
PHOSPHATE SERPL-MCNC: 1.2 MG/DL — LOW (ref 2.5–4.5)
PLATELET # BLD AUTO: 197 K/UL — SIGNIFICANT CHANGE UP (ref 150–400)
POTASSIUM SERPL-MCNC: 4 MMOL/L — SIGNIFICANT CHANGE UP (ref 3.5–5.3)
POTASSIUM SERPL-SCNC: 4 MMOL/L — SIGNIFICANT CHANGE UP (ref 3.5–5.3)
PROT SERPL-MCNC: 6.1 GM/DL — SIGNIFICANT CHANGE UP (ref 6–8.3)
PROTHROM AB SERPL-ACNC: 19.1 SEC — HIGH (ref 9.5–13)
RBC # BLD: 3.4 M/UL — LOW (ref 4.2–5.8)
RBC # FLD: 15.4 % — HIGH (ref 10.3–14.5)
SODIUM SERPL-SCNC: 137 MMOL/L — SIGNIFICANT CHANGE UP (ref 135–145)
SPECIMEN SOURCE: SIGNIFICANT CHANGE UP
SPECIMEN SOURCE: SIGNIFICANT CHANGE UP
WBC # BLD: 6.13 K/UL — SIGNIFICANT CHANGE UP (ref 3.8–10.5)
WBC # FLD AUTO: 6.13 K/UL — SIGNIFICANT CHANGE UP (ref 3.8–10.5)

## 2024-08-05 PROCEDURE — 99233 SBSQ HOSP IP/OBS HIGH 50: CPT

## 2024-08-05 PROCEDURE — 49406 IMAGE CATH FLUID PERI/RETRO: CPT

## 2024-08-05 PROCEDURE — 99222 1ST HOSP IP/OBS MODERATE 55: CPT

## 2024-08-05 RX ORDER — MAGNESIUM OXIDE 400 MG
400 TABLET ORAL
Refills: 0 | Status: DISCONTINUED | OUTPATIENT
Start: 2024-08-06 | End: 2024-08-17

## 2024-08-05 RX ORDER — PIPERACILLIN-TAZO-DEXTROSE,ISO 3.375G/5
3.38 IV SOLUTION, PIGGYBACK PREMIX FROZEN(ML) INTRAVENOUS EVERY 8 HOURS
Refills: 0 | Status: DISCONTINUED | OUTPATIENT
Start: 2024-08-05 | End: 2024-08-09

## 2024-08-05 RX ORDER — FENTANYL CITRATE-0.9 % NACL/PF 100MCG/2ML
50 SYRINGE (ML) INTRAVENOUS
Refills: 0 | Status: DISCONTINUED | OUTPATIENT
Start: 2024-08-05 | End: 2024-08-05

## 2024-08-05 RX ORDER — HYDROMORPHONE/SOD CHLOR,ISO/PF 2 MG/10 ML
0.5 SYRINGE (ML) INJECTION
Refills: 0 | Status: DISCONTINUED | OUTPATIENT
Start: 2024-08-05 | End: 2024-08-05

## 2024-08-05 RX ORDER — ONDANSETRON HCL/PF 4 MG/2 ML
4 VIAL (ML) INJECTION ONCE
Refills: 0 | Status: DISCONTINUED | OUTPATIENT
Start: 2024-08-05 | End: 2024-08-05

## 2024-08-05 RX ORDER — SODIUM PHOSPHATE,DIBASIC DIHYD
15 POWDER (GRAM) MISCELLANEOUS ONCE
Refills: 0 | Status: COMPLETED | OUTPATIENT
Start: 2024-08-05 | End: 2024-08-05

## 2024-08-05 RX ORDER — MAGNESIUM SULFATE 500 MG/ML
1 SYRINGE (ML) INJECTION EVERY 6 HOURS
Refills: 0 | Status: COMPLETED | OUTPATIENT
Start: 2024-08-05 | End: 2024-08-06

## 2024-08-05 RX ORDER — OXYCODONE HYDROCHLORIDE 30 MG/1
5 TABLET ORAL ONCE
Refills: 0 | Status: DISCONTINUED | OUTPATIENT
Start: 2024-08-05 | End: 2024-08-05

## 2024-08-05 RX ADMIN — Medication 0.5 MILLIGRAM(S): at 10:33

## 2024-08-05 RX ADMIN — Medication 62.5 MILLIMOLE(S): at 13:33

## 2024-08-05 RX ADMIN — Medication 2 TABLET(S): at 20:39

## 2024-08-05 RX ADMIN — POTASSIUM CHLORIDE, DEXTROSE MONOHYDRATE AND SODIUM CHLORIDE 85 MILLILITER(S): 150; 5; 900 INJECTION, SOLUTION INTRAVENOUS at 18:12

## 2024-08-05 RX ADMIN — Medication 2 MILLIGRAM(S): at 20:21

## 2024-08-05 RX ADMIN — Medication 100 MILLIGRAM(S): at 05:01

## 2024-08-05 RX ADMIN — Medication 2000 UNIT(S): at 20:38

## 2024-08-05 RX ADMIN — Medication 500 MILLIGRAM(S): at 05:01

## 2024-08-05 RX ADMIN — Medication 0.5 MILLIGRAM(S): at 10:03

## 2024-08-05 RX ADMIN — DEXTROMETHORPHAN HBR, GUAIFENESIN 600 MILLIGRAM(S): 200 LIQUID ORAL at 20:33

## 2024-08-05 RX ADMIN — URSODIOL 300 MILLIGRAM(S): 300 CAPSULE ORAL at 20:39

## 2024-08-05 RX ADMIN — Medication 0.5 MILLIGRAM(S): at 05:32

## 2024-08-05 RX ADMIN — Medication 100 GRAM(S): at 18:57

## 2024-08-05 RX ADMIN — Medication 2 MILLIGRAM(S): at 19:51

## 2024-08-05 RX ADMIN — Medication 100 GRAM(S): at 11:55

## 2024-08-05 RX ADMIN — Medication 25 GRAM(S): at 13:32

## 2024-08-05 RX ADMIN — Medication 0.5 MILLIGRAM(S): at 18:40

## 2024-08-05 RX ADMIN — Medication 0.5 MILLIGRAM(S): at 14:37

## 2024-08-05 RX ADMIN — Medication 0.5 MILLIGRAM(S): at 18:10

## 2024-08-05 RX ADMIN — POTASSIUM CHLORIDE, DEXTROSE MONOHYDRATE AND SODIUM CHLORIDE 85 MILLILITER(S): 150; 5; 900 INJECTION, SOLUTION INTRAVENOUS at 01:43

## 2024-08-05 RX ADMIN — Medication 0.5 MILLIGRAM(S): at 05:02

## 2024-08-05 RX ADMIN — Medication 25 GRAM(S): at 20:41

## 2024-08-05 RX ADMIN — Medication 0.5 MILLIGRAM(S): at 14:07

## 2024-08-05 RX ADMIN — Medication 0.5 MILLIGRAM(S): at 01:17

## 2024-08-05 RX ADMIN — Medication 100 MILLIGRAM(S): at 20:32

## 2024-08-05 RX ADMIN — Medication 0.5 MILLIGRAM(S): at 00:47

## 2024-08-06 LAB
ALBUMIN SERPL ELPH-MCNC: 1.9 G/DL — LOW (ref 3.3–5)
ALP SERPL-CCNC: 224 U/L — HIGH (ref 40–120)
ALT FLD-CCNC: 26 U/L — SIGNIFICANT CHANGE UP (ref 12–78)
AMMONIA BLD-MCNC: 53 UMOL/L — HIGH (ref 11–32)
ANION GAP SERPL CALC-SCNC: 4 MMOL/L — LOW (ref 5–17)
AST SERPL-CCNC: 113 U/L — HIGH (ref 15–37)
BILIRUB DIRECT SERPL-MCNC: 11.9 MG/DL — HIGH (ref 0–0.3)
BILIRUB INDIRECT FLD-MCNC: 3.3 MG/DL — HIGH (ref 0.2–1)
BILIRUB SERPL-MCNC: 15.2 MG/DL — HIGH (ref 0.2–1.2)
BUN SERPL-MCNC: 6 MG/DL — LOW (ref 7–23)
CALCIUM SERPL-MCNC: 7.9 MG/DL — LOW (ref 8.5–10.1)
CHLORIDE SERPL-SCNC: 105 MMOL/L — SIGNIFICANT CHANGE UP (ref 96–108)
CO2 SERPL-SCNC: 26 MMOL/L — SIGNIFICANT CHANGE UP (ref 22–31)
CREAT SERPL-MCNC: 0.58 MG/DL — SIGNIFICANT CHANGE UP (ref 0.5–1.3)
CRP SERPL-MCNC: 99 MG/L — HIGH
EGFR: 119 ML/MIN/1.73M2 — SIGNIFICANT CHANGE UP
EGFR: 119 ML/MIN/1.73M2 — SIGNIFICANT CHANGE UP
GLUCOSE SERPL-MCNC: 120 MG/DL — HIGH (ref 70–99)
GRAM STN FLD: SIGNIFICANT CHANGE UP
HCT VFR BLD CALC: 31.1 % — LOW (ref 39–50)
HGB BLD-MCNC: 10.7 G/DL — LOW (ref 13–17)
INR BLD: 1.66 RATIO — HIGH (ref 0.85–1.18)
MAGNESIUM SERPL-MCNC: 1.5 MG/DL — LOW (ref 1.6–2.6)
MCHC RBC-ENTMCNC: 32.4 PG — SIGNIFICANT CHANGE UP (ref 27–34)
MCHC RBC-ENTMCNC: 34.4 GM/DL — SIGNIFICANT CHANGE UP (ref 32–36)
MCV RBC AUTO: 94.2 FL — SIGNIFICANT CHANGE UP (ref 80–100)
PHOSPHATE SERPL-MCNC: 1.6 MG/DL — LOW (ref 2.5–4.5)
PLATELET # BLD AUTO: 189 K/UL — SIGNIFICANT CHANGE UP (ref 150–400)
POTASSIUM SERPL-MCNC: 4 MMOL/L — SIGNIFICANT CHANGE UP (ref 3.5–5.3)
POTASSIUM SERPL-SCNC: 4 MMOL/L — SIGNIFICANT CHANGE UP (ref 3.5–5.3)
PROT SERPL-MCNC: 6 GM/DL — SIGNIFICANT CHANGE UP (ref 6–8.3)
PROTHROM AB SERPL-ACNC: 18.5 SEC — HIGH (ref 9.5–13)
RBC # BLD: 3.3 M/UL — LOW (ref 4.2–5.8)
RBC # FLD: 15.8 % — HIGH (ref 10.3–14.5)
SODIUM SERPL-SCNC: 135 MMOL/L — SIGNIFICANT CHANGE UP (ref 135–145)
SPECIMEN SOURCE: SIGNIFICANT CHANGE UP
WBC # BLD: 6.19 K/UL — SIGNIFICANT CHANGE UP (ref 3.8–10.5)
WBC # FLD AUTO: 6.19 K/UL — SIGNIFICANT CHANGE UP (ref 3.8–10.5)

## 2024-08-06 PROCEDURE — 99232 SBSQ HOSP IP/OBS MODERATE 35: CPT

## 2024-08-06 PROCEDURE — 99231 SBSQ HOSP IP/OBS SF/LOW 25: CPT

## 2024-08-06 PROCEDURE — 71250 CT THORAX DX C-: CPT | Mod: 26

## 2024-08-06 RX ORDER — MAGNESIUM SULFATE 500 MG/ML
1 SYRINGE (ML) INJECTION ONCE
Refills: 0 | Status: COMPLETED | OUTPATIENT
Start: 2024-08-06 | End: 2024-08-06

## 2024-08-06 RX ORDER — SOD PHOS DI, MONO/K PHOS MONO 250 MG
1 TABLET ORAL ONCE
Refills: 0 | Status: COMPLETED | OUTPATIENT
Start: 2024-08-06 | End: 2024-08-06

## 2024-08-06 RX ADMIN — Medication 0.5 MILLIGRAM(S): at 04:44

## 2024-08-06 RX ADMIN — Medication 2 MILLIGRAM(S): at 03:06

## 2024-08-06 RX ADMIN — URSODIOL 300 MILLIGRAM(S): 300 CAPSULE ORAL at 20:19

## 2024-08-06 RX ADMIN — Medication 1 PACKET(S): at 10:39

## 2024-08-06 RX ADMIN — URSODIOL 300 MILLIGRAM(S): 300 CAPSULE ORAL at 10:39

## 2024-08-06 RX ADMIN — ENOXAPARIN SODIUM 40 MILLIGRAM(S): 100 INJECTION SUBCUTANEOUS at 10:38

## 2024-08-06 RX ADMIN — Medication 100 GRAM(S): at 03:06

## 2024-08-06 RX ADMIN — POLYETHYLENE GLYCOL 3350 17 GRAM(S): 17 POWDER, FOR SOLUTION ORAL at 10:39

## 2024-08-06 RX ADMIN — DEXTROMETHORPHAN HBR, GUAIFENESIN 600 MILLIGRAM(S): 200 LIQUID ORAL at 10:39

## 2024-08-06 RX ADMIN — Medication 400 MILLIGRAM(S): at 17:58

## 2024-08-06 RX ADMIN — Medication 100 GRAM(S): at 10:39

## 2024-08-06 RX ADMIN — Medication 0.5 MILLIGRAM(S): at 20:19

## 2024-08-06 RX ADMIN — Medication 0.5 MILLIGRAM(S): at 05:10

## 2024-08-06 RX ADMIN — Medication 0.5 MILLIGRAM(S): at 20:40

## 2024-08-06 RX ADMIN — Medication 100 MILLIGRAM(S): at 13:02

## 2024-08-06 RX ADMIN — Medication 100 MILLIGRAM(S): at 04:38

## 2024-08-06 RX ADMIN — Medication 0.5 MILLIGRAM(S): at 00:11

## 2024-08-06 RX ADMIN — Medication 25 GRAM(S): at 13:02

## 2024-08-06 RX ADMIN — Medication 0.5 MILLIGRAM(S): at 00:41

## 2024-08-06 RX ADMIN — DEXTROMETHORPHAN HBR, GUAIFENESIN 600 MILLIGRAM(S): 200 LIQUID ORAL at 20:19

## 2024-08-06 RX ADMIN — Medication 400 MILLIGRAM(S): at 10:39

## 2024-08-06 RX ADMIN — Medication 25 GRAM(S): at 20:22

## 2024-08-06 RX ADMIN — Medication 2 MILLIGRAM(S): at 10:40

## 2024-08-06 RX ADMIN — Medication 2 TABLET(S): at 20:19

## 2024-08-06 RX ADMIN — Medication 100 MILLIGRAM(S): at 20:18

## 2024-08-06 RX ADMIN — Medication 0.5 MILLIGRAM(S): at 14:40

## 2024-08-06 RX ADMIN — Medication 2 MILLIGRAM(S): at 03:36

## 2024-08-06 RX ADMIN — Medication 25 GRAM(S): at 04:38

## 2024-08-06 RX ADMIN — Medication 2 MILLIGRAM(S): at 23:41

## 2024-08-06 RX ADMIN — Medication 2000 UNIT(S): at 20:19

## 2024-08-07 ENCOUNTER — APPOINTMENT (OUTPATIENT)
Dept: GASTROENTEROLOGY | Facility: CLINIC | Age: 49
End: 2024-08-07

## 2024-08-07 PROBLEM — C18.9 COLON CANCER: Status: ACTIVE | Noted: 2024-08-07

## 2024-08-07 LAB
ALBUMIN SERPL ELPH-MCNC: 1.9 G/DL — LOW (ref 3.3–5)
ALP SERPL-CCNC: 207 U/L — HIGH (ref 40–120)
ALT FLD-CCNC: 26 U/L — SIGNIFICANT CHANGE UP (ref 12–78)
AMMONIA BLD-MCNC: 60 UMOL/L — HIGH (ref 11–32)
ANION GAP SERPL CALC-SCNC: 7 MMOL/L — SIGNIFICANT CHANGE UP (ref 5–17)
AST SERPL-CCNC: 115 U/L — HIGH (ref 15–37)
BILE AC FLD-MCNC: 52.9 UMOL/L — HIGH (ref 0–10)
BILIRUB DIRECT SERPL-MCNC: 11.3 MG/DL — HIGH (ref 0–0.3)
BILIRUB INDIRECT FLD-MCNC: 2.8 MG/DL — HIGH (ref 0.2–1)
BILIRUB SERPL-MCNC: 14.1 MG/DL — HIGH (ref 0.2–1.2)
BUN SERPL-MCNC: 6 MG/DL — LOW (ref 7–23)
CALCIUM SERPL-MCNC: 8.2 MG/DL — LOW (ref 8.5–10.1)
CHLORIDE SERPL-SCNC: 104 MMOL/L — SIGNIFICANT CHANGE UP (ref 96–108)
CO2 SERPL-SCNC: 24 MMOL/L — SIGNIFICANT CHANGE UP (ref 22–31)
CREAT SERPL-MCNC: 0.42 MG/DL — LOW (ref 0.5–1.3)
CRP SERPL-MCNC: 77 MG/L — HIGH
EGFR: 131 ML/MIN/1.73M2 — SIGNIFICANT CHANGE UP
EGFR: 131 ML/MIN/1.73M2 — SIGNIFICANT CHANGE UP
GLUCOSE SERPL-MCNC: 106 MG/DL — HIGH (ref 70–99)
HCT VFR BLD CALC: 31.2 % — LOW (ref 39–50)
HGB BLD-MCNC: 10.4 G/DL — LOW (ref 13–17)
INR BLD: 2.07 RATIO — HIGH (ref 0.85–1.18)
MAGNESIUM SERPL-MCNC: 1.2 MG/DL — LOW (ref 1.6–2.6)
MCHC RBC-ENTMCNC: 31.6 PG — SIGNIFICANT CHANGE UP (ref 27–34)
MCHC RBC-ENTMCNC: 33.3 GM/DL — SIGNIFICANT CHANGE UP (ref 32–36)
MCV RBC AUTO: 94.8 FL — SIGNIFICANT CHANGE UP (ref 80–100)
PHOSPHATE SERPL-MCNC: 1.9 MG/DL — LOW (ref 2.5–4.5)
PLATELET # BLD AUTO: 215 K/UL — SIGNIFICANT CHANGE UP (ref 150–400)
POTASSIUM SERPL-MCNC: 3.6 MMOL/L — SIGNIFICANT CHANGE UP (ref 3.5–5.3)
POTASSIUM SERPL-SCNC: 3.6 MMOL/L — SIGNIFICANT CHANGE UP (ref 3.5–5.3)
PROT SERPL-MCNC: 6.2 GM/DL — SIGNIFICANT CHANGE UP (ref 6–8.3)
PROTHROM AB SERPL-ACNC: 22.9 SEC — HIGH (ref 9.5–13)
RBC # BLD: 3.29 M/UL — LOW (ref 4.2–5.8)
RBC # FLD: 15.9 % — HIGH (ref 10.3–14.5)
SODIUM SERPL-SCNC: 135 MMOL/L — SIGNIFICANT CHANGE UP (ref 135–145)
WBC # BLD: 6.15 K/UL — SIGNIFICANT CHANGE UP (ref 3.8–10.5)
WBC # FLD AUTO: 6.15 K/UL — SIGNIFICANT CHANGE UP (ref 3.8–10.5)

## 2024-08-07 PROCEDURE — 99232 SBSQ HOSP IP/OBS MODERATE 35: CPT

## 2024-08-07 RX ADMIN — Medication 400 MILLIGRAM(S): at 10:20

## 2024-08-07 RX ADMIN — POLYETHYLENE GLYCOL 3350 17 GRAM(S): 17 POWDER, FOR SOLUTION ORAL at 10:20

## 2024-08-07 RX ADMIN — Medication 400 MILLIGRAM(S): at 16:46

## 2024-08-07 RX ADMIN — Medication 0.5 MILLIGRAM(S): at 16:47

## 2024-08-07 RX ADMIN — Medication 2000 UNIT(S): at 21:21

## 2024-08-07 RX ADMIN — DEXTROMETHORPHAN HBR, GUAIFENESIN 600 MILLIGRAM(S): 200 LIQUID ORAL at 21:21

## 2024-08-07 RX ADMIN — Medication 2 MILLIGRAM(S): at 22:19

## 2024-08-07 RX ADMIN — Medication 25 GRAM(S): at 21:22

## 2024-08-07 RX ADMIN — Medication 0.5 MILLIGRAM(S): at 11:33

## 2024-08-07 RX ADMIN — Medication 100 MILLIGRAM(S): at 05:53

## 2024-08-07 RX ADMIN — URSODIOL 300 MILLIGRAM(S): 300 CAPSULE ORAL at 21:21

## 2024-08-07 RX ADMIN — Medication 0.5 MILLIGRAM(S): at 06:47

## 2024-08-07 RX ADMIN — Medication 0.5 MILLIGRAM(S): at 21:21

## 2024-08-07 RX ADMIN — Medication 2 TABLET(S): at 21:21

## 2024-08-07 RX ADMIN — DEXTROMETHORPHAN HBR, GUAIFENESIN 600 MILLIGRAM(S): 200 LIQUID ORAL at 10:20

## 2024-08-07 RX ADMIN — ENOXAPARIN SODIUM 40 MILLIGRAM(S): 100 INJECTION SUBCUTANEOUS at 10:20

## 2024-08-07 RX ADMIN — Medication 0.5 MILLIGRAM(S): at 06:27

## 2024-08-07 RX ADMIN — Medication 25 GRAM(S): at 05:53

## 2024-08-07 RX ADMIN — Medication 2 MILLIGRAM(S): at 00:34

## 2024-08-07 RX ADMIN — Medication 2 MILLIGRAM(S): at 22:40

## 2024-08-07 RX ADMIN — URSODIOL 300 MILLIGRAM(S): 300 CAPSULE ORAL at 10:21

## 2024-08-07 RX ADMIN — Medication 25 GRAM(S): at 14:24

## 2024-08-07 RX ADMIN — Medication 100 MILLIGRAM(S): at 21:20

## 2024-08-07 RX ADMIN — Medication 100 MILLIGRAM(S): at 14:24

## 2024-08-07 RX ADMIN — Medication 0.5 MILLIGRAM(S): at 21:45

## 2024-08-08 LAB
ALBUMIN SERPL ELPH-MCNC: 1.8 G/DL — LOW (ref 3.3–5)
ALBUMIN SERPL ELPH-MCNC: 1.9 G/DL — LOW (ref 3.3–5)
ALP SERPL-CCNC: 204 U/L — HIGH (ref 40–120)
ALP SERPL-CCNC: 213 U/L — HIGH (ref 40–120)
ALT FLD-CCNC: 29 U/L — SIGNIFICANT CHANGE UP (ref 12–78)
ALT FLD-CCNC: 33 U/L — SIGNIFICANT CHANGE UP (ref 12–78)
AMMONIA BLD-MCNC: 76 UMOL/L — HIGH (ref 11–32)
ANION GAP SERPL CALC-SCNC: 7 MMOL/L — SIGNIFICANT CHANGE UP (ref 5–17)
ANION GAP SERPL CALC-SCNC: 8 MMOL/L — SIGNIFICANT CHANGE UP (ref 5–17)
AST SERPL-CCNC: 136 U/L — HIGH (ref 15–37)
AST SERPL-CCNC: 155 U/L — HIGH (ref 15–37)
BILE AC FLD-MCNC: 46 UMOL/L — HIGH (ref 0–10)
BILIRUB DIRECT SERPL-MCNC: 10.4 MG/DL — HIGH (ref 0–0.3)
BILIRUB INDIRECT FLD-MCNC: 2.6 MG/DL — HIGH (ref 0.2–1)
BILIRUB SERPL-MCNC: 13 MG/DL — HIGH (ref 0.2–1.2)
BILIRUB SERPL-MCNC: 13.8 MG/DL — HIGH (ref 0.2–1.2)
BUN SERPL-MCNC: 6 MG/DL — LOW (ref 7–23)
BUN SERPL-MCNC: 7 MG/DL — SIGNIFICANT CHANGE UP (ref 7–23)
CALCIUM SERPL-MCNC: 8.2 MG/DL — LOW (ref 8.5–10.1)
CALCIUM SERPL-MCNC: 8.2 MG/DL — LOW (ref 8.5–10.1)
CHLORIDE SERPL-SCNC: 101 MMOL/L — SIGNIFICANT CHANGE UP (ref 96–108)
CHLORIDE SERPL-SCNC: 104 MMOL/L — SIGNIFICANT CHANGE UP (ref 96–108)
CO2 SERPL-SCNC: 24 MMOL/L — SIGNIFICANT CHANGE UP (ref 22–31)
CO2 SERPL-SCNC: 25 MMOL/L — SIGNIFICANT CHANGE UP (ref 22–31)
CREAT SERPL-MCNC: 0.44 MG/DL — LOW (ref 0.5–1.3)
CREAT SERPL-MCNC: 0.52 MG/DL — SIGNIFICANT CHANGE UP (ref 0.5–1.3)
CRP SERPL-MCNC: 64 MG/L — HIGH
EGFR: 123 ML/MIN/1.73M2 — SIGNIFICANT CHANGE UP
EGFR: 123 ML/MIN/1.73M2 — SIGNIFICANT CHANGE UP
EGFR: 129 ML/MIN/1.73M2 — SIGNIFICANT CHANGE UP
EGFR: 129 ML/MIN/1.73M2 — SIGNIFICANT CHANGE UP
GLUCOSE SERPL-MCNC: 109 MG/DL — HIGH (ref 70–99)
GLUCOSE SERPL-MCNC: 110 MG/DL — HIGH (ref 70–99)
HCT VFR BLD CALC: 31.6 % — LOW (ref 39–50)
HCT VFR BLD CALC: 32.2 % — LOW (ref 39–50)
HGB BLD-MCNC: 10.6 G/DL — LOW (ref 13–17)
HGB BLD-MCNC: 10.8 G/DL — LOW (ref 13–17)
INR BLD: 2.1 RATIO — HIGH (ref 0.85–1.18)
MAGNESIUM SERPL-MCNC: 1.2 MG/DL — LOW (ref 1.6–2.6)
MAGNESIUM SERPL-MCNC: 1.2 MG/DL — LOW (ref 1.6–2.6)
MCHC RBC-ENTMCNC: 31.4 PG — SIGNIFICANT CHANGE UP (ref 27–34)
MCHC RBC-ENTMCNC: 31.8 PG — SIGNIFICANT CHANGE UP (ref 27–34)
MCHC RBC-ENTMCNC: 33.5 GM/DL — SIGNIFICANT CHANGE UP (ref 32–36)
MCHC RBC-ENTMCNC: 33.5 GM/DL — SIGNIFICANT CHANGE UP (ref 32–36)
MCV RBC AUTO: 93.6 FL — SIGNIFICANT CHANGE UP (ref 80–100)
MCV RBC AUTO: 94.9 FL — SIGNIFICANT CHANGE UP (ref 80–100)
PHOSPHATE SERPL-MCNC: 1.6 MG/DL — LOW (ref 2.5–4.5)
PHOSPHATE SERPL-MCNC: 2.2 MG/DL — LOW (ref 2.5–4.5)
PLATELET # BLD AUTO: 236 K/UL — SIGNIFICANT CHANGE UP (ref 150–400)
PLATELET # BLD AUTO: 264 K/UL — SIGNIFICANT CHANGE UP (ref 150–400)
POTASSIUM SERPL-MCNC: 3.6 MMOL/L — SIGNIFICANT CHANGE UP (ref 3.5–5.3)
POTASSIUM SERPL-MCNC: 3.8 MMOL/L — SIGNIFICANT CHANGE UP (ref 3.5–5.3)
POTASSIUM SERPL-SCNC: 3.6 MMOL/L — SIGNIFICANT CHANGE UP (ref 3.5–5.3)
POTASSIUM SERPL-SCNC: 3.8 MMOL/L — SIGNIFICANT CHANGE UP (ref 3.5–5.3)
PROT SERPL-MCNC: 6.3 GM/DL — SIGNIFICANT CHANGE UP (ref 6–8.3)
PROT SERPL-MCNC: 6.5 GM/DL — SIGNIFICANT CHANGE UP (ref 6–8.3)
PROTHROM AB SERPL-ACNC: 23.2 SEC — HIGH (ref 9.5–13)
RBC # BLD: 3.33 M/UL — LOW (ref 4.2–5.8)
RBC # BLD: 3.44 M/UL — LOW (ref 4.2–5.8)
RBC # FLD: 16 % — HIGH (ref 10.3–14.5)
RBC # FLD: 16 % — HIGH (ref 10.3–14.5)
SODIUM SERPL-SCNC: 133 MMOL/L — LOW (ref 135–145)
SODIUM SERPL-SCNC: 136 MMOL/L — SIGNIFICANT CHANGE UP (ref 135–145)
WBC # BLD: 6.33 K/UL — SIGNIFICANT CHANGE UP (ref 3.8–10.5)
WBC # BLD: 9.3 K/UL — SIGNIFICANT CHANGE UP (ref 3.8–10.5)
WBC # FLD AUTO: 6.33 K/UL — SIGNIFICANT CHANGE UP (ref 3.8–10.5)
WBC # FLD AUTO: 9.3 K/UL — SIGNIFICANT CHANGE UP (ref 3.8–10.5)

## 2024-08-08 PROCEDURE — 99231 SBSQ HOSP IP/OBS SF/LOW 25: CPT

## 2024-08-08 PROCEDURE — 99232 SBSQ HOSP IP/OBS MODERATE 35: CPT

## 2024-08-08 RX ORDER — HYDROMORPHONE/SOD CHLOR,ISO/PF 2 MG/10 ML
0.5 SYRINGE (ML) INJECTION EVERY 4 HOURS
Refills: 0 | Status: DISCONTINUED | OUTPATIENT
Start: 2024-08-08 | End: 2024-08-11

## 2024-08-08 RX ORDER — ACETAMINOPHEN 500 MG/5ML
1000 LIQUID (ML) ORAL ONCE
Refills: 0 | Status: COMPLETED | OUTPATIENT
Start: 2024-08-08 | End: 2024-08-08

## 2024-08-08 RX ORDER — MAGNESIUM SULFATE 500 MG/ML
1 SYRINGE (ML) INJECTION ONCE
Refills: 0 | Status: COMPLETED | OUTPATIENT
Start: 2024-08-08 | End: 2024-08-08

## 2024-08-08 RX ORDER — SOD PHOS DI, MONO/K PHOS MONO 250 MG
1 TABLET ORAL ONCE
Refills: 0 | Status: COMPLETED | OUTPATIENT
Start: 2024-08-08 | End: 2024-08-08

## 2024-08-08 RX ADMIN — URSODIOL 300 MILLIGRAM(S): 300 CAPSULE ORAL at 21:49

## 2024-08-08 RX ADMIN — Medication 2000 UNIT(S): at 21:49

## 2024-08-08 RX ADMIN — Medication 100 MILLIGRAM(S): at 14:57

## 2024-08-08 RX ADMIN — Medication 400 MILLIGRAM(S): at 19:47

## 2024-08-08 RX ADMIN — Medication 2 MILLIGRAM(S): at 21:48

## 2024-08-08 RX ADMIN — Medication 2 MILLIGRAM(S): at 13:00

## 2024-08-08 RX ADMIN — Medication 25 GRAM(S): at 21:49

## 2024-08-08 RX ADMIN — Medication 1000 MILLIGRAM(S): at 20:00

## 2024-08-08 RX ADMIN — Medication 100 MILLIGRAM(S): at 05:35

## 2024-08-08 RX ADMIN — DEXTROMETHORPHAN HBR, GUAIFENESIN 600 MILLIGRAM(S): 200 LIQUID ORAL at 11:12

## 2024-08-08 RX ADMIN — Medication 2 MILLIGRAM(S): at 05:36

## 2024-08-08 RX ADMIN — DEXTROMETHORPHAN HBR, GUAIFENESIN 600 MILLIGRAM(S): 200 LIQUID ORAL at 21:49

## 2024-08-08 RX ADMIN — Medication 2 MILLIGRAM(S): at 23:00

## 2024-08-08 RX ADMIN — Medication 2 TABLET(S): at 21:49

## 2024-08-08 RX ADMIN — Medication 0.5 MILLIGRAM(S): at 18:24

## 2024-08-08 RX ADMIN — Medication 25 GRAM(S): at 05:34

## 2024-08-08 RX ADMIN — Medication 100 MILLIGRAM(S): at 21:49

## 2024-08-08 RX ADMIN — Medication 2 MILLIGRAM(S): at 12:17

## 2024-08-08 RX ADMIN — Medication 100 GRAM(S): at 14:57

## 2024-08-08 RX ADMIN — Medication 1 PACKET(S): at 14:57

## 2024-08-08 RX ADMIN — URSODIOL 300 MILLIGRAM(S): 300 CAPSULE ORAL at 11:12

## 2024-08-08 RX ADMIN — Medication 400 MILLIGRAM(S): at 11:12

## 2024-08-08 RX ADMIN — ENOXAPARIN SODIUM 40 MILLIGRAM(S): 100 INJECTION SUBCUTANEOUS at 11:11

## 2024-08-08 RX ADMIN — Medication 25 GRAM(S): at 16:31

## 2024-08-08 RX ADMIN — Medication 400 MILLIGRAM(S): at 17:34

## 2024-08-09 LAB
-  BLOOD PCR PANEL: SIGNIFICANT CHANGE UP
ALBUMIN SERPL ELPH-MCNC: 1.8 G/DL — LOW (ref 3.3–5)
ALP SERPL-CCNC: 196 U/L — HIGH (ref 40–120)
ALT FLD-CCNC: 31 U/L — SIGNIFICANT CHANGE UP (ref 12–78)
ANION GAP SERPL CALC-SCNC: 6 MMOL/L — SIGNIFICANT CHANGE UP (ref 5–17)
AST SERPL-CCNC: 155 U/L — HIGH (ref 15–37)
BILIRUB SERPL-MCNC: 14.1 MG/DL — HIGH (ref 0.2–1.2)
BUN SERPL-MCNC: 7 MG/DL — SIGNIFICANT CHANGE UP (ref 7–23)
CALCIUM SERPL-MCNC: 8.2 MG/DL — LOW (ref 8.5–10.1)
CHLORIDE SERPL-SCNC: 101 MMOL/L — SIGNIFICANT CHANGE UP (ref 96–108)
CO2 SERPL-SCNC: 25 MMOL/L — SIGNIFICANT CHANGE UP (ref 22–31)
CREAT SERPL-MCNC: 0.44 MG/DL — LOW (ref 0.5–1.3)
CULTURE RESULTS: SIGNIFICANT CHANGE UP
CULTURE RESULTS: SIGNIFICANT CHANGE UP
EGFR: 129 ML/MIN/1.73M2 — SIGNIFICANT CHANGE UP
EGFR: 129 ML/MIN/1.73M2 — SIGNIFICANT CHANGE UP
GLUCOSE SERPL-MCNC: 119 MG/DL — HIGH (ref 70–99)
GRAM STN FLD: ABNORMAL
GRAM STN FLD: ABNORMAL
HCT VFR BLD CALC: 30.1 % — LOW (ref 39–50)
HGB BLD-MCNC: 10.2 G/DL — LOW (ref 13–17)
INR BLD: 2.1 RATIO — HIGH (ref 0.85–1.18)
MAGNESIUM SERPL-MCNC: 1.2 MG/DL — LOW (ref 1.6–2.6)
MCHC RBC-ENTMCNC: 32 PG — SIGNIFICANT CHANGE UP (ref 27–34)
MCHC RBC-ENTMCNC: 33.9 GM/DL — SIGNIFICANT CHANGE UP (ref 32–36)
MCV RBC AUTO: 94.4 FL — SIGNIFICANT CHANGE UP (ref 80–100)
METHOD TYPE: SIGNIFICANT CHANGE UP
PLATELET # BLD AUTO: 215 K/UL — SIGNIFICANT CHANGE UP (ref 150–400)
POTASSIUM SERPL-MCNC: 3.6 MMOL/L — SIGNIFICANT CHANGE UP (ref 3.5–5.3)
POTASSIUM SERPL-SCNC: 3.6 MMOL/L — SIGNIFICANT CHANGE UP (ref 3.5–5.3)
PROT SERPL-MCNC: 6.4 GM/DL — SIGNIFICANT CHANGE UP (ref 6–8.3)
PROTHROM AB SERPL-ACNC: 23.2 SEC — HIGH (ref 9.5–13)
RBC # BLD: 3.19 M/UL — LOW (ref 4.2–5.8)
RBC # FLD: 16.2 % — HIGH (ref 10.3–14.5)
SODIUM SERPL-SCNC: 132 MMOL/L — LOW (ref 135–145)
SPECIMEN SOURCE: SIGNIFICANT CHANGE UP
WBC # BLD: 8.03 K/UL — SIGNIFICANT CHANGE UP (ref 3.8–10.5)
WBC # FLD AUTO: 8.03 K/UL — SIGNIFICANT CHANGE UP (ref 3.8–10.5)

## 2024-08-09 PROCEDURE — 99231 SBSQ HOSP IP/OBS SF/LOW 25: CPT

## 2024-08-09 PROCEDURE — 74177 CT ABD & PELVIS W/CONTRAST: CPT | Mod: 26

## 2024-08-09 PROCEDURE — 99232 SBSQ HOSP IP/OBS MODERATE 35: CPT

## 2024-08-09 RX ORDER — MEROPENEM 1 G/30ML
1000 INJECTION INTRAVENOUS EVERY 8 HOURS
Refills: 0 | Status: DISCONTINUED | OUTPATIENT
Start: 2024-08-09 | End: 2024-08-09

## 2024-08-09 RX ORDER — MAGNESIUM SULFATE 500 MG/ML
2 SYRINGE (ML) INJECTION ONCE
Refills: 0 | Status: COMPLETED | OUTPATIENT
Start: 2024-08-09 | End: 2024-08-09

## 2024-08-09 RX ORDER — SOD PHOS DI, MONO/K PHOS MONO 250 MG
1 TABLET ORAL ONCE
Refills: 0 | Status: COMPLETED | OUTPATIENT
Start: 2024-08-09 | End: 2024-08-09

## 2024-08-09 RX ORDER — MEROPENEM 1 G/30ML
1000 INJECTION INTRAVENOUS EVERY 8 HOURS
Refills: 0 | Status: COMPLETED | OUTPATIENT
Start: 2024-08-09 | End: 2024-08-16

## 2024-08-09 RX ADMIN — POLYETHYLENE GLYCOL 3350 17 GRAM(S): 17 POWDER, FOR SOLUTION ORAL at 09:04

## 2024-08-09 RX ADMIN — Medication 25 GRAM(S): at 05:17

## 2024-08-09 RX ADMIN — DEXTROMETHORPHAN HBR, GUAIFENESIN 600 MILLIGRAM(S): 200 LIQUID ORAL at 21:17

## 2024-08-09 RX ADMIN — DEXTROMETHORPHAN HBR, GUAIFENESIN 600 MILLIGRAM(S): 200 LIQUID ORAL at 09:04

## 2024-08-09 RX ADMIN — Medication 25 GRAM(S): at 13:12

## 2024-08-09 RX ADMIN — ENOXAPARIN SODIUM 40 MILLIGRAM(S): 100 INJECTION SUBCUTANEOUS at 09:07

## 2024-08-09 RX ADMIN — Medication 0.5 MILLIGRAM(S): at 17:48

## 2024-08-09 RX ADMIN — MEROPENEM 1000 MILLIGRAM(S): 1 INJECTION INTRAVENOUS at 21:17

## 2024-08-09 RX ADMIN — Medication 2000 UNIT(S): at 21:16

## 2024-08-09 RX ADMIN — Medication 100 MILLIGRAM(S): at 21:14

## 2024-08-09 RX ADMIN — Medication 0.5 MILLIGRAM(S): at 22:33

## 2024-08-09 RX ADMIN — Medication 0.5 MILLIGRAM(S): at 09:02

## 2024-08-09 RX ADMIN — Medication 5 MILLIGRAM(S): at 09:05

## 2024-08-09 RX ADMIN — Medication 0.5 MILLIGRAM(S): at 09:21

## 2024-08-09 RX ADMIN — URSODIOL 300 MILLIGRAM(S): 300 CAPSULE ORAL at 09:05

## 2024-08-09 RX ADMIN — Medication 2 MILLIGRAM(S): at 21:43

## 2024-08-09 RX ADMIN — Medication 100 MILLIGRAM(S): at 13:15

## 2024-08-09 RX ADMIN — Medication 0.5 MILLIGRAM(S): at 22:03

## 2024-08-09 RX ADMIN — Medication 2 MILLIGRAM(S): at 21:13

## 2024-08-09 RX ADMIN — Medication 400 MILLIGRAM(S): at 17:16

## 2024-08-09 RX ADMIN — Medication 0.5 MILLIGRAM(S): at 13:44

## 2024-08-09 RX ADMIN — Medication 2 TABLET(S): at 21:18

## 2024-08-09 RX ADMIN — Medication 25 GRAM(S): at 10:43

## 2024-08-09 RX ADMIN — Medication 0.5 MILLIGRAM(S): at 05:33

## 2024-08-09 RX ADMIN — Medication 400 MILLIGRAM(S): at 09:06

## 2024-08-09 RX ADMIN — URSODIOL 300 MILLIGRAM(S): 300 CAPSULE ORAL at 21:18

## 2024-08-09 RX ADMIN — Medication 1 PACKET(S): at 10:42

## 2024-08-09 RX ADMIN — Medication 3 MILLIGRAM(S): at 21:19

## 2024-08-09 RX ADMIN — Medication 0.5 MILLIGRAM(S): at 13:35

## 2024-08-09 RX ADMIN — Medication 100 MILLIGRAM(S): at 05:17

## 2024-08-10 LAB
ALBUMIN SERPL ELPH-MCNC: 1.9 G/DL — LOW (ref 3.3–5)
ALP SERPL-CCNC: 176 U/L — HIGH (ref 40–120)
ALT FLD-CCNC: 34 U/L — SIGNIFICANT CHANGE UP (ref 12–78)
AMMONIA BLD-MCNC: 57 UMOL/L — HIGH (ref 11–32)
ANION GAP SERPL CALC-SCNC: 7 MMOL/L — SIGNIFICANT CHANGE UP (ref 5–17)
AST SERPL-CCNC: 190 U/L — HIGH (ref 15–37)
BILIRUB SERPL-MCNC: 14.8 MG/DL — HIGH (ref 0.2–1.2)
BUN SERPL-MCNC: 9 MG/DL — SIGNIFICANT CHANGE UP (ref 7–23)
CALCIUM SERPL-MCNC: 8.5 MG/DL — SIGNIFICANT CHANGE UP (ref 8.5–10.1)
CHLORIDE SERPL-SCNC: 99 MMOL/L — SIGNIFICANT CHANGE UP (ref 96–108)
CO2 SERPL-SCNC: 26 MMOL/L — SIGNIFICANT CHANGE UP (ref 22–31)
CREAT SERPL-MCNC: 0.5 MG/DL — SIGNIFICANT CHANGE UP (ref 0.5–1.3)
CULTURE RESULTS: SIGNIFICANT CHANGE UP
EGFR: 124 ML/MIN/1.73M2 — SIGNIFICANT CHANGE UP
EGFR: 124 ML/MIN/1.73M2 — SIGNIFICANT CHANGE UP
GLUCOSE SERPL-MCNC: 103 MG/DL — HIGH (ref 70–99)
HCT VFR BLD CALC: 30.7 % — LOW (ref 39–50)
HGB BLD-MCNC: 10.4 G/DL — LOW (ref 13–17)
INR BLD: 2.44 RATIO — HIGH (ref 0.85–1.18)
MAGNESIUM SERPL-MCNC: 1.3 MG/DL — LOW (ref 1.6–2.6)
MCHC RBC-ENTMCNC: 31.8 PG — SIGNIFICANT CHANGE UP (ref 27–34)
MCHC RBC-ENTMCNC: 33.9 GM/DL — SIGNIFICANT CHANGE UP (ref 32–36)
MCV RBC AUTO: 93.9 FL — SIGNIFICANT CHANGE UP (ref 80–100)
PLATELET # BLD AUTO: 252 K/UL — SIGNIFICANT CHANGE UP (ref 150–400)
POTASSIUM SERPL-MCNC: 4 MMOL/L — SIGNIFICANT CHANGE UP (ref 3.5–5.3)
POTASSIUM SERPL-SCNC: 4 MMOL/L — SIGNIFICANT CHANGE UP (ref 3.5–5.3)
PROT SERPL-MCNC: 6.5 GM/DL — SIGNIFICANT CHANGE UP (ref 6–8.3)
PROTHROM AB SERPL-ACNC: 26.9 SEC — HIGH (ref 9.5–13)
RBC # BLD: 3.27 M/UL — LOW (ref 4.2–5.8)
RBC # FLD: 16.1 % — HIGH (ref 10.3–14.5)
SODIUM SERPL-SCNC: 132 MMOL/L — LOW (ref 135–145)
SPECIMEN SOURCE: SIGNIFICANT CHANGE UP
WBC # BLD: 11.5 K/UL — HIGH (ref 3.8–10.5)
WBC # FLD AUTO: 11.5 K/UL — HIGH (ref 3.8–10.5)

## 2024-08-10 PROCEDURE — 99232 SBSQ HOSP IP/OBS MODERATE 35: CPT

## 2024-08-10 RX ORDER — MAGNESIUM SULFATE 500 MG/ML
2 SYRINGE (ML) INJECTION ONCE
Refills: 0 | Status: COMPLETED | OUTPATIENT
Start: 2024-08-10 | End: 2024-08-10

## 2024-08-10 RX ADMIN — Medication 2 TABLET(S): at 21:15

## 2024-08-10 RX ADMIN — Medication 0.5 MILLIGRAM(S): at 10:32

## 2024-08-10 RX ADMIN — Medication 0.5 MILLIGRAM(S): at 10:17

## 2024-08-10 RX ADMIN — Medication 2 MILLIGRAM(S): at 06:08

## 2024-08-10 RX ADMIN — Medication 100 MILLIGRAM(S): at 14:24

## 2024-08-10 RX ADMIN — Medication 0.5 MILLIGRAM(S): at 18:37

## 2024-08-10 RX ADMIN — DEXTROMETHORPHAN HBR, GUAIFENESIN 600 MILLIGRAM(S): 200 LIQUID ORAL at 09:10

## 2024-08-10 RX ADMIN — Medication 0.5 MILLIGRAM(S): at 22:26

## 2024-08-10 RX ADMIN — Medication 2 MILLIGRAM(S): at 23:57

## 2024-08-10 RX ADMIN — MEROPENEM 1000 MILLIGRAM(S): 1 INJECTION INTRAVENOUS at 21:15

## 2024-08-10 RX ADMIN — Medication 0.5 MILLIGRAM(S): at 02:57

## 2024-08-10 RX ADMIN — Medication 0.5 MILLIGRAM(S): at 03:27

## 2024-08-10 RX ADMIN — Medication 0.5 MILLIGRAM(S): at 14:24

## 2024-08-10 RX ADMIN — MEROPENEM 1000 MILLIGRAM(S): 1 INJECTION INTRAVENOUS at 14:24

## 2024-08-10 RX ADMIN — Medication 400 MILLIGRAM(S): at 09:10

## 2024-08-10 RX ADMIN — POLYETHYLENE GLYCOL 3350 17 GRAM(S): 17 POWDER, FOR SOLUTION ORAL at 09:11

## 2024-08-10 RX ADMIN — Medication 4 MILLIGRAM(S): at 21:17

## 2024-08-10 RX ADMIN — Medication 400 MILLIGRAM(S): at 18:09

## 2024-08-10 RX ADMIN — ENOXAPARIN SODIUM 40 MILLIGRAM(S): 100 INJECTION SUBCUTANEOUS at 09:11

## 2024-08-10 RX ADMIN — Medication 100 MILLIGRAM(S): at 21:14

## 2024-08-10 RX ADMIN — Medication 2000 UNIT(S): at 21:14

## 2024-08-10 RX ADMIN — Medication 0.5 MILLIGRAM(S): at 14:39

## 2024-08-10 RX ADMIN — Medication 25 GRAM(S): at 11:26

## 2024-08-10 RX ADMIN — Medication 0.5 MILLIGRAM(S): at 18:22

## 2024-08-10 RX ADMIN — URSODIOL 300 MILLIGRAM(S): 300 CAPSULE ORAL at 21:17

## 2024-08-10 RX ADMIN — Medication 2 MILLIGRAM(S): at 05:38

## 2024-08-10 RX ADMIN — URSODIOL 300 MILLIGRAM(S): 300 CAPSULE ORAL at 09:11

## 2024-08-10 RX ADMIN — Medication 100 MILLIGRAM(S): at 05:38

## 2024-08-10 RX ADMIN — MEROPENEM 1000 MILLIGRAM(S): 1 INJECTION INTRAVENOUS at 05:41

## 2024-08-10 RX ADMIN — DEXTROMETHORPHAN HBR, GUAIFENESIN 600 MILLIGRAM(S): 200 LIQUID ORAL at 21:14

## 2024-08-11 LAB
-  AMIKACIN: SIGNIFICANT CHANGE UP
-  AZTREONAM: SIGNIFICANT CHANGE UP
-  CEFEPIME: SIGNIFICANT CHANGE UP
-  CEFTRIAXONE: SIGNIFICANT CHANGE UP
-  CIPROFLOXACIN: SIGNIFICANT CHANGE UP
-  GENTAMICIN: SIGNIFICANT CHANGE UP
-  LEVOFLOXACIN: SIGNIFICANT CHANGE UP
-  MEROPENEM: SIGNIFICANT CHANGE UP
-  PIPERACILLIN/TAZOBACTAM: SIGNIFICANT CHANGE UP
-  TOBRAMYCIN: SIGNIFICANT CHANGE UP
-  TRIMETHOPRIM/SULFAMETHOXAZOLE: SIGNIFICANT CHANGE UP
ALBUMIN SERPL ELPH-MCNC: 1.8 G/DL — LOW (ref 3.3–5)
ALP SERPL-CCNC: 177 U/L — HIGH (ref 40–120)
ALT FLD-CCNC: 30 U/L — SIGNIFICANT CHANGE UP (ref 12–78)
ANION GAP SERPL CALC-SCNC: 4 MMOL/L — LOW (ref 5–17)
AST SERPL-CCNC: 222 U/L — HIGH (ref 15–37)
BILIRUB SERPL-MCNC: 15.8 MG/DL — HIGH (ref 0.2–1.2)
BUN SERPL-MCNC: 11 MG/DL — SIGNIFICANT CHANGE UP (ref 7–23)
CALCIUM SERPL-MCNC: 8.3 MG/DL — LOW (ref 8.5–10.1)
CHLORIDE SERPL-SCNC: 97 MMOL/L — SIGNIFICANT CHANGE UP (ref 96–108)
CO2 SERPL-SCNC: 27 MMOL/L — SIGNIFICANT CHANGE UP (ref 22–31)
CREAT SERPL-MCNC: 0.45 MG/DL — LOW (ref 0.5–1.3)
CULTURE RESULTS: ABNORMAL
CULTURE RESULTS: ABNORMAL
EGFR: 128 ML/MIN/1.73M2 — SIGNIFICANT CHANGE UP
EGFR: 128 ML/MIN/1.73M2 — SIGNIFICANT CHANGE UP
GLUCOSE SERPL-MCNC: 110 MG/DL — HIGH (ref 70–99)
HCT VFR BLD CALC: 30 % — LOW (ref 39–50)
HGB BLD-MCNC: 10.2 G/DL — LOW (ref 13–17)
MAGNESIUM SERPL-MCNC: 1.4 MG/DL — LOW (ref 1.6–2.6)
MCHC RBC-ENTMCNC: 31.8 PG — SIGNIFICANT CHANGE UP (ref 27–34)
MCHC RBC-ENTMCNC: 34 GM/DL — SIGNIFICANT CHANGE UP (ref 32–36)
MCV RBC AUTO: 93.5 FL — SIGNIFICANT CHANGE UP (ref 80–100)
METHOD TYPE: SIGNIFICANT CHANGE UP
ORGANISM # SPEC MICROSCOPIC CNT: ABNORMAL
ORGANISM # SPEC MICROSCOPIC CNT: ABNORMAL
ORGANISM # SPEC MICROSCOPIC CNT: SIGNIFICANT CHANGE UP
PLATELET # BLD AUTO: 263 K/UL — SIGNIFICANT CHANGE UP (ref 150–400)
POTASSIUM SERPL-MCNC: 3.9 MMOL/L — SIGNIFICANT CHANGE UP (ref 3.5–5.3)
POTASSIUM SERPL-SCNC: 3.9 MMOL/L — SIGNIFICANT CHANGE UP (ref 3.5–5.3)
PROT SERPL-MCNC: 6.6 GM/DL — SIGNIFICANT CHANGE UP (ref 6–8.3)
RBC # BLD: 3.21 M/UL — LOW (ref 4.2–5.8)
RBC # FLD: 16 % — HIGH (ref 10.3–14.5)
SODIUM SERPL-SCNC: 128 MMOL/L — LOW (ref 135–145)
SPECIMEN SOURCE: SIGNIFICANT CHANGE UP
SPECIMEN SOURCE: SIGNIFICANT CHANGE UP
WBC # BLD: 14.46 K/UL — HIGH (ref 3.8–10.5)
WBC # FLD AUTO: 14.46 K/UL — HIGH (ref 3.8–10.5)

## 2024-08-11 PROCEDURE — 99232 SBSQ HOSP IP/OBS MODERATE 35: CPT

## 2024-08-11 RX ORDER — HYDROMORPHONE/SOD CHLOR,ISO/PF 2 MG/10 ML
1 SYRINGE (ML) INJECTION EVERY 4 HOURS
Refills: 0 | Status: DISCONTINUED | OUTPATIENT
Start: 2024-08-11 | End: 2024-08-15

## 2024-08-11 RX ORDER — MAGNESIUM SULFATE 500 MG/ML
2 SYRINGE (ML) INJECTION ONCE
Refills: 0 | Status: COMPLETED | OUTPATIENT
Start: 2024-08-11 | End: 2024-08-11

## 2024-08-11 RX ADMIN — Medication 100 MILLIGRAM(S): at 21:10

## 2024-08-11 RX ADMIN — Medication 1 MILLIGRAM(S): at 23:02

## 2024-08-11 RX ADMIN — Medication 650 MILLIGRAM(S): at 18:59

## 2024-08-11 RX ADMIN — Medication 0.5 MILLIGRAM(S): at 04:16

## 2024-08-11 RX ADMIN — Medication 2000 UNIT(S): at 21:10

## 2024-08-11 RX ADMIN — MEROPENEM 1000 MILLIGRAM(S): 1 INJECTION INTRAVENOUS at 14:47

## 2024-08-11 RX ADMIN — Medication 75 MILLILITER(S): at 08:53

## 2024-08-11 RX ADMIN — Medication 1 MILLIGRAM(S): at 23:32

## 2024-08-11 RX ADMIN — Medication 1 MILLIGRAM(S): at 15:03

## 2024-08-11 RX ADMIN — URSODIOL 300 MILLIGRAM(S): 300 CAPSULE ORAL at 21:15

## 2024-08-11 RX ADMIN — Medication 75 MILLILITER(S): at 22:22

## 2024-08-11 RX ADMIN — Medication 25 GRAM(S): at 09:57

## 2024-08-11 RX ADMIN — MEROPENEM 1000 MILLIGRAM(S): 1 INJECTION INTRAVENOUS at 06:06

## 2024-08-11 RX ADMIN — Medication 1 MILLIGRAM(S): at 18:59

## 2024-08-11 RX ADMIN — Medication 100 MILLIGRAM(S): at 06:06

## 2024-08-11 RX ADMIN — Medication 2 MILLIGRAM(S): at 00:27

## 2024-08-11 RX ADMIN — Medication 400 MILLIGRAM(S): at 08:52

## 2024-08-11 RX ADMIN — URSODIOL 300 MILLIGRAM(S): 300 CAPSULE ORAL at 08:51

## 2024-08-11 RX ADMIN — Medication 2 TABLET(S): at 21:11

## 2024-08-11 RX ADMIN — Medication 1 MILLIGRAM(S): at 09:08

## 2024-08-11 RX ADMIN — MEROPENEM 1000 MILLIGRAM(S): 1 INJECTION INTRAVENOUS at 21:11

## 2024-08-11 RX ADMIN — Medication 400 MILLIGRAM(S): at 18:03

## 2024-08-11 RX ADMIN — Medication 1 MILLIGRAM(S): at 08:53

## 2024-08-11 RX ADMIN — POLYETHYLENE GLYCOL 3350 17 GRAM(S): 17 POWDER, FOR SOLUTION ORAL at 08:51

## 2024-08-11 RX ADMIN — Medication 1 MILLIGRAM(S): at 14:48

## 2024-08-11 RX ADMIN — Medication 4 MILLIGRAM(S): at 21:14

## 2024-08-11 RX ADMIN — DEXTROMETHORPHAN HBR, GUAIFENESIN 600 MILLIGRAM(S): 200 LIQUID ORAL at 08:52

## 2024-08-11 RX ADMIN — Medication 4 MILLIGRAM(S): at 14:48

## 2024-08-11 RX ADMIN — Medication 100 MILLIGRAM(S): at 14:47

## 2024-08-11 RX ADMIN — ENOXAPARIN SODIUM 40 MILLIGRAM(S): 100 INJECTION SUBCUTANEOUS at 08:51

## 2024-08-11 RX ADMIN — DEXTROMETHORPHAN HBR, GUAIFENESIN 600 MILLIGRAM(S): 200 LIQUID ORAL at 21:10

## 2024-08-12 ENCOUNTER — RESULT REVIEW (OUTPATIENT)
Age: 49
End: 2024-08-12

## 2024-08-12 LAB
ALBUMIN SERPL ELPH-MCNC: 1.8 G/DL — LOW (ref 3.3–5)
ALP SERPL-CCNC: 172 U/L — HIGH (ref 40–120)
ALT FLD-CCNC: 30 U/L — SIGNIFICANT CHANGE UP (ref 12–78)
ANION GAP SERPL CALC-SCNC: 7 MMOL/L — SIGNIFICANT CHANGE UP (ref 5–17)
AST SERPL-CCNC: 242 U/L — HIGH (ref 15–37)
BILIRUB SERPL-MCNC: 17.5 MG/DL — HIGH (ref 0.2–1.2)
BUN SERPL-MCNC: 11 MG/DL — SIGNIFICANT CHANGE UP (ref 7–23)
CALCIUM SERPL-MCNC: 8.7 MG/DL — SIGNIFICANT CHANGE UP (ref 8.5–10.1)
CHLORIDE SERPL-SCNC: 97 MMOL/L — SIGNIFICANT CHANGE UP (ref 96–108)
CO2 SERPL-SCNC: 24 MMOL/L — SIGNIFICANT CHANGE UP (ref 22–31)
CREAT SERPL-MCNC: 0.45 MG/DL — LOW (ref 0.5–1.3)
EGFR: 128 ML/MIN/1.73M2 — SIGNIFICANT CHANGE UP
EGFR: 128 ML/MIN/1.73M2 — SIGNIFICANT CHANGE UP
GLUCOSE SERPL-MCNC: 96 MG/DL — SIGNIFICANT CHANGE UP (ref 70–99)
HCT VFR BLD CALC: 30 % — LOW (ref 39–50)
HGB BLD-MCNC: 10.1 G/DL — LOW (ref 13–17)
MAGNESIUM SERPL-MCNC: 1.6 MG/DL — SIGNIFICANT CHANGE UP (ref 1.6–2.6)
MCHC RBC-ENTMCNC: 31.8 PG — SIGNIFICANT CHANGE UP (ref 27–34)
MCHC RBC-ENTMCNC: 33.7 GM/DL — SIGNIFICANT CHANGE UP (ref 32–36)
MCV RBC AUTO: 94.3 FL — SIGNIFICANT CHANGE UP (ref 80–100)
PHOSPHATE SERPL-MCNC: 1.7 MG/DL — LOW (ref 2.5–4.5)
PLATELET # BLD AUTO: 283 K/UL — SIGNIFICANT CHANGE UP (ref 150–400)
POTASSIUM SERPL-MCNC: 4.1 MMOL/L — SIGNIFICANT CHANGE UP (ref 3.5–5.3)
POTASSIUM SERPL-SCNC: 4.1 MMOL/L — SIGNIFICANT CHANGE UP (ref 3.5–5.3)
PROT SERPL-MCNC: 6.7 GM/DL — SIGNIFICANT CHANGE UP (ref 6–8.3)
RBC # BLD: 3.18 M/UL — LOW (ref 4.2–5.8)
RBC # FLD: 15.7 % — HIGH (ref 10.3–14.5)
SODIUM SERPL-SCNC: 128 MMOL/L — LOW (ref 135–145)
WBC # BLD: 14.25 K/UL — HIGH (ref 3.8–10.5)
WBC # FLD AUTO: 14.25 K/UL — HIGH (ref 3.8–10.5)

## 2024-08-12 PROCEDURE — 49424 ASSESS CYST CONTRAST INJECT: CPT

## 2024-08-12 PROCEDURE — 99231 SBSQ HOSP IP/OBS SF/LOW 25: CPT

## 2024-08-12 PROCEDURE — 76080 X-RAY EXAM OF FISTULA: CPT | Mod: 26

## 2024-08-12 PROCEDURE — 93306 TTE W/DOPPLER COMPLETE: CPT | Mod: 26

## 2024-08-12 PROCEDURE — 99232 SBSQ HOSP IP/OBS MODERATE 35: CPT

## 2024-08-12 RX ORDER — SODIUM PHOSPHATE,DIBASIC DIHYD
30 POWDER (GRAM) MISCELLANEOUS ONCE
Refills: 0 | Status: COMPLETED | OUTPATIENT
Start: 2024-08-12 | End: 2024-08-12

## 2024-08-12 RX ORDER — FENTANYL CITRATE-0.9 % NACL/PF 100MCG/2ML
1 SYRINGE (ML) INTRAVENOUS
Refills: 0 | Status: DISCONTINUED | OUTPATIENT
Start: 2024-08-12 | End: 2024-08-15

## 2024-08-12 RX ORDER — FUROSEMIDE 10 MG/ML
20 INJECTION INTRAMUSCULAR; INTRAVENOUS DAILY
Refills: 0 | Status: DISCONTINUED | OUTPATIENT
Start: 2024-08-12 | End: 2024-08-15

## 2024-08-12 RX ADMIN — Medication 1 MILLIGRAM(S): at 03:45

## 2024-08-12 RX ADMIN — MEROPENEM 1000 MILLIGRAM(S): 1 INJECTION INTRAVENOUS at 21:15

## 2024-08-12 RX ADMIN — Medication 1 MILLIGRAM(S): at 07:50

## 2024-08-12 RX ADMIN — Medication 1 MILLIGRAM(S): at 04:15

## 2024-08-12 RX ADMIN — Medication 1 MILLIGRAM(S): at 14:05

## 2024-08-12 RX ADMIN — Medication 1 MILLIGRAM(S): at 18:29

## 2024-08-12 RX ADMIN — Medication 4 MILLIGRAM(S): at 17:42

## 2024-08-12 RX ADMIN — URSODIOL 300 MILLIGRAM(S): 300 CAPSULE ORAL at 21:15

## 2024-08-12 RX ADMIN — MEROPENEM 1000 MILLIGRAM(S): 1 INJECTION INTRAVENOUS at 14:18

## 2024-08-12 RX ADMIN — Medication 100 MILLIGRAM(S): at 21:16

## 2024-08-12 RX ADMIN — MEROPENEM 1000 MILLIGRAM(S): 1 INJECTION INTRAVENOUS at 05:52

## 2024-08-12 RX ADMIN — Medication 400 MILLIGRAM(S): at 09:08

## 2024-08-12 RX ADMIN — FUROSEMIDE 20 MILLIGRAM(S): 10 INJECTION INTRAMUSCULAR; INTRAVENOUS at 12:20

## 2024-08-12 RX ADMIN — URSODIOL 300 MILLIGRAM(S): 300 CAPSULE ORAL at 09:09

## 2024-08-12 RX ADMIN — Medication 1 MILLIGRAM(S): at 23:50

## 2024-08-12 RX ADMIN — Medication 1 MILLIGRAM(S): at 08:20

## 2024-08-12 RX ADMIN — Medication 1 MILLIGRAM(S): at 23:32

## 2024-08-12 RX ADMIN — Medication 400 MILLIGRAM(S): at 17:42

## 2024-08-12 RX ADMIN — Medication 1 MILLIGRAM(S): at 13:35

## 2024-08-12 RX ADMIN — Medication 650 MILLIGRAM(S): at 05:51

## 2024-08-12 RX ADMIN — Medication 2 TABLET(S): at 21:15

## 2024-08-12 RX ADMIN — Medication 100 MILLIGRAM(S): at 05:48

## 2024-08-12 RX ADMIN — Medication 85 MILLIMOLE(S): at 10:07

## 2024-08-12 RX ADMIN — POLYETHYLENE GLYCOL 3350 17 GRAM(S): 17 POWDER, FOR SOLUTION ORAL at 09:08

## 2024-08-12 RX ADMIN — Medication 2000 UNIT(S): at 21:15

## 2024-08-12 RX ADMIN — Medication 100 MILLIGRAM(S): at 14:18

## 2024-08-12 RX ADMIN — Medication 1 PATCH: at 12:20

## 2024-08-12 RX ADMIN — DEXTROMETHORPHAN HBR, GUAIFENESIN 600 MILLIGRAM(S): 200 LIQUID ORAL at 21:16

## 2024-08-12 RX ADMIN — DEXTROMETHORPHAN HBR, GUAIFENESIN 600 MILLIGRAM(S): 200 LIQUID ORAL at 09:08

## 2024-08-12 RX ADMIN — ENOXAPARIN SODIUM 40 MILLIGRAM(S): 100 INJECTION SUBCUTANEOUS at 09:07

## 2024-08-13 LAB
ALBUMIN SERPL ELPH-MCNC: 1.6 G/DL — LOW (ref 3.3–5)
ALP SERPL-CCNC: 163 U/L — HIGH (ref 40–120)
ALT FLD-CCNC: 30 U/L — SIGNIFICANT CHANGE UP (ref 12–78)
ANION GAP SERPL CALC-SCNC: 7 MMOL/L — SIGNIFICANT CHANGE UP (ref 5–17)
AST SERPL-CCNC: 232 U/L — HIGH (ref 15–37)
BILIRUB SERPL-MCNC: 15.2 MG/DL — HIGH (ref 0.2–1.2)
BUN SERPL-MCNC: 11 MG/DL — SIGNIFICANT CHANGE UP (ref 7–23)
CALCIUM SERPL-MCNC: 8.4 MG/DL — LOW (ref 8.5–10.1)
CHLORIDE SERPL-SCNC: 96 MMOL/L — SIGNIFICANT CHANGE UP (ref 96–108)
CO2 SERPL-SCNC: 28 MMOL/L — SIGNIFICANT CHANGE UP (ref 22–31)
CREAT SERPL-MCNC: 0.47 MG/DL — LOW (ref 0.5–1.3)
EGFR: 127 ML/MIN/1.73M2 — SIGNIFICANT CHANGE UP
EGFR: 127 ML/MIN/1.73M2 — SIGNIFICANT CHANGE UP
GLUCOSE SERPL-MCNC: 99 MG/DL — SIGNIFICANT CHANGE UP (ref 70–99)
HCT VFR BLD CALC: 27.5 % — LOW (ref 39–50)
HGB BLD-MCNC: 9.3 G/DL — LOW (ref 13–17)
MAGNESIUM SERPL-MCNC: 1.4 MG/DL — LOW (ref 1.6–2.6)
MCHC RBC-ENTMCNC: 32 PG — SIGNIFICANT CHANGE UP (ref 27–34)
MCHC RBC-ENTMCNC: 33.8 GM/DL — SIGNIFICANT CHANGE UP (ref 32–36)
MCV RBC AUTO: 94.5 FL — SIGNIFICANT CHANGE UP (ref 80–100)
PHOSPHATE SERPL-MCNC: 1.6 MG/DL — LOW (ref 2.5–4.5)
PLATELET # BLD AUTO: 283 K/UL — SIGNIFICANT CHANGE UP (ref 150–400)
POTASSIUM SERPL-MCNC: 4.2 MMOL/L — SIGNIFICANT CHANGE UP (ref 3.5–5.3)
POTASSIUM SERPL-SCNC: 4.2 MMOL/L — SIGNIFICANT CHANGE UP (ref 3.5–5.3)
PROT SERPL-MCNC: 6.3 GM/DL — SIGNIFICANT CHANGE UP (ref 6–8.3)
RBC # BLD: 2.91 M/UL — LOW (ref 4.2–5.8)
RBC # FLD: 15.9 % — HIGH (ref 10.3–14.5)
SODIUM SERPL-SCNC: 131 MMOL/L — LOW (ref 135–145)
WBC # BLD: 10.97 K/UL — HIGH (ref 3.8–10.5)
WBC # FLD AUTO: 10.97 K/UL — HIGH (ref 3.8–10.5)

## 2024-08-13 PROCEDURE — 99223 1ST HOSP IP/OBS HIGH 75: CPT

## 2024-08-13 PROCEDURE — 93010 ELECTROCARDIOGRAM REPORT: CPT

## 2024-08-13 PROCEDURE — 99497 ADVNCD CARE PLAN 30 MIN: CPT | Mod: 25

## 2024-08-13 RX ORDER — MAGNESIUM SULFATE 500 MG/ML
2 SYRINGE (ML) INJECTION ONCE
Refills: 0 | Status: COMPLETED | OUTPATIENT
Start: 2024-08-13 | End: 2024-08-13

## 2024-08-13 RX ORDER — SODIUM PHOSPHATE,DIBASIC DIHYD
30 POWDER (GRAM) MISCELLANEOUS ONCE
Refills: 0 | Status: COMPLETED | OUTPATIENT
Start: 2024-08-13 | End: 2024-08-13

## 2024-08-13 RX ADMIN — DEXTROMETHORPHAN HBR, GUAIFENESIN 600 MILLIGRAM(S): 200 LIQUID ORAL at 21:36

## 2024-08-13 RX ADMIN — Medication 4 MILLIGRAM(S): at 07:53

## 2024-08-13 RX ADMIN — Medication 85 MILLIMOLE(S): at 13:35

## 2024-08-13 RX ADMIN — Medication 1 MILLIGRAM(S): at 21:45

## 2024-08-13 RX ADMIN — Medication 2 TABLET(S): at 21:36

## 2024-08-13 RX ADMIN — Medication 25 GRAM(S): at 10:04

## 2024-08-13 RX ADMIN — Medication 1 MILLIGRAM(S): at 13:10

## 2024-08-13 RX ADMIN — Medication 100 MILLIGRAM(S): at 21:36

## 2024-08-13 RX ADMIN — URSODIOL 300 MILLIGRAM(S): 300 CAPSULE ORAL at 21:32

## 2024-08-13 RX ADMIN — MEROPENEM 1000 MILLIGRAM(S): 1 INJECTION INTRAVENOUS at 21:32

## 2024-08-13 RX ADMIN — Medication 1 MILLIGRAM(S): at 08:38

## 2024-08-13 RX ADMIN — Medication 400 MILLIGRAM(S): at 17:35

## 2024-08-13 RX ADMIN — Medication 100 MILLIGRAM(S): at 06:01

## 2024-08-13 RX ADMIN — Medication 100 MILLIGRAM(S): at 13:35

## 2024-08-13 RX ADMIN — Medication 1 MILLIGRAM(S): at 17:35

## 2024-08-13 RX ADMIN — MEROPENEM 1000 MILLIGRAM(S): 1 INJECTION INTRAVENOUS at 13:36

## 2024-08-13 RX ADMIN — FUROSEMIDE 20 MILLIGRAM(S): 10 INJECTION INTRAMUSCULAR; INTRAVENOUS at 10:03

## 2024-08-13 RX ADMIN — Medication 1 MILLIGRAM(S): at 04:15

## 2024-08-13 RX ADMIN — MEROPENEM 1000 MILLIGRAM(S): 1 INJECTION INTRAVENOUS at 06:01

## 2024-08-13 RX ADMIN — DEXTROMETHORPHAN HBR, GUAIFENESIN 600 MILLIGRAM(S): 200 LIQUID ORAL at 10:03

## 2024-08-13 RX ADMIN — Medication 1 MILLIGRAM(S): at 12:19

## 2024-08-13 RX ADMIN — Medication 400 MILLIGRAM(S): at 10:03

## 2024-08-13 RX ADMIN — Medication 1 PATCH: at 07:59

## 2024-08-13 RX ADMIN — Medication 1 MILLIGRAM(S): at 04:04

## 2024-08-13 RX ADMIN — Medication 1 MILLIGRAM(S): at 07:48

## 2024-08-13 RX ADMIN — Medication 1 MILLIGRAM(S): at 21:32

## 2024-08-13 RX ADMIN — ENOXAPARIN SODIUM 40 MILLIGRAM(S): 100 INJECTION SUBCUTANEOUS at 10:04

## 2024-08-13 RX ADMIN — URSODIOL 300 MILLIGRAM(S): 300 CAPSULE ORAL at 10:03

## 2024-08-13 RX ADMIN — Medication 2000 UNIT(S): at 21:37

## 2024-08-14 LAB
ADD ON TEST-SPECIMEN IN LAB: SIGNIFICANT CHANGE UP
ALBUMIN SERPL ELPH-MCNC: 1.6 G/DL — LOW (ref 3.3–5)
ALP SERPL-CCNC: 198 U/L — HIGH (ref 40–120)
ALT FLD-CCNC: 34 U/L — SIGNIFICANT CHANGE UP (ref 12–78)
AMMONIA BLD-MCNC: 68 UMOL/L — HIGH (ref 11–32)
ANION GAP SERPL CALC-SCNC: 6 MMOL/L — SIGNIFICANT CHANGE UP (ref 5–17)
AST SERPL-CCNC: 242 U/L — HIGH (ref 15–37)
BILIRUB SERPL-MCNC: 15.3 MG/DL — HIGH (ref 0.2–1.2)
BUN SERPL-MCNC: 13 MG/DL — SIGNIFICANT CHANGE UP (ref 7–23)
CALCIUM SERPL-MCNC: 8.5 MG/DL — SIGNIFICANT CHANGE UP (ref 8.5–10.1)
CHLORIDE SERPL-SCNC: 94 MMOL/L — LOW (ref 96–108)
CO2 SERPL-SCNC: 29 MMOL/L — SIGNIFICANT CHANGE UP (ref 22–31)
CREAT SERPL-MCNC: 0.42 MG/DL — LOW (ref 0.5–1.3)
EGFR: 131 ML/MIN/1.73M2 — SIGNIFICANT CHANGE UP
EGFR: 131 ML/MIN/1.73M2 — SIGNIFICANT CHANGE UP
GLUCOSE SERPL-MCNC: 122 MG/DL — HIGH (ref 70–99)
HCT VFR BLD CALC: 26.9 % — LOW (ref 39–50)
HGB BLD-MCNC: 9.1 G/DL — LOW (ref 13–17)
MAGNESIUM SERPL-MCNC: 1.6 MG/DL — SIGNIFICANT CHANGE UP (ref 1.6–2.6)
MCHC RBC-ENTMCNC: 32.2 PG — SIGNIFICANT CHANGE UP (ref 27–34)
MCHC RBC-ENTMCNC: 33.8 GM/DL — SIGNIFICANT CHANGE UP (ref 32–36)
MCV RBC AUTO: 95.1 FL — SIGNIFICANT CHANGE UP (ref 80–100)
NT-PROBNP SERPL-SCNC: 218 PG/ML — HIGH (ref 0–125)
PHOSPHATE SERPL-MCNC: 1.6 MG/DL — LOW (ref 2.5–4.5)
PLATELET # BLD AUTO: 279 K/UL — SIGNIFICANT CHANGE UP (ref 150–400)
POTASSIUM SERPL-MCNC: 3.8 MMOL/L — SIGNIFICANT CHANGE UP (ref 3.5–5.3)
POTASSIUM SERPL-SCNC: 3.8 MMOL/L — SIGNIFICANT CHANGE UP (ref 3.5–5.3)
PROT SERPL-MCNC: 6.3 GM/DL — SIGNIFICANT CHANGE UP (ref 6–8.3)
RBC # BLD: 2.83 M/UL — LOW (ref 4.2–5.8)
RBC # FLD: 15.9 % — HIGH (ref 10.3–14.5)
SODIUM SERPL-SCNC: 129 MMOL/L — LOW (ref 135–145)
TROPONIN I, HIGH SENSITIVITY RESULT: <3 NG/L — SIGNIFICANT CHANGE UP
WBC # BLD: 7.93 K/UL — SIGNIFICANT CHANGE UP (ref 3.8–10.5)
WBC # FLD AUTO: 7.93 K/UL — SIGNIFICANT CHANGE UP (ref 3.8–10.5)

## 2024-08-14 PROCEDURE — 99232 SBSQ HOSP IP/OBS MODERATE 35: CPT

## 2024-08-14 PROCEDURE — 99231 SBSQ HOSP IP/OBS SF/LOW 25: CPT

## 2024-08-14 PROCEDURE — 93010 ELECTROCARDIOGRAM REPORT: CPT

## 2024-08-14 RX ORDER — SODIUM PHOSPHATE,DIBASIC DIHYD
15 POWDER (GRAM) MISCELLANEOUS ONCE
Refills: 0 | Status: COMPLETED | OUTPATIENT
Start: 2024-08-14 | End: 2024-08-14

## 2024-08-14 RX ADMIN — DEXTROMETHORPHAN HBR, GUAIFENESIN 600 MILLIGRAM(S): 200 LIQUID ORAL at 21:10

## 2024-08-14 RX ADMIN — Medication 1 MILLIGRAM(S): at 16:13

## 2024-08-14 RX ADMIN — MEROPENEM 1000 MILLIGRAM(S): 1 INJECTION INTRAVENOUS at 05:24

## 2024-08-14 RX ADMIN — Medication 1 MILLIGRAM(S): at 21:13

## 2024-08-14 RX ADMIN — Medication 100 MILLIGRAM(S): at 21:09

## 2024-08-14 RX ADMIN — URSODIOL 300 MILLIGRAM(S): 300 CAPSULE ORAL at 21:10

## 2024-08-14 RX ADMIN — Medication 2000 UNIT(S): at 21:09

## 2024-08-14 RX ADMIN — Medication 1 MILLIGRAM(S): at 21:30

## 2024-08-14 RX ADMIN — Medication 1 MILLIGRAM(S): at 06:05

## 2024-08-14 RX ADMIN — Medication 1 MILLIGRAM(S): at 02:20

## 2024-08-14 RX ADMIN — MEROPENEM 1000 MILLIGRAM(S): 1 INJECTION INTRAVENOUS at 21:10

## 2024-08-14 RX ADMIN — Medication 400 MILLIGRAM(S): at 09:20

## 2024-08-14 RX ADMIN — Medication 1 MILLIGRAM(S): at 11:00

## 2024-08-14 RX ADMIN — Medication 1 PATCH: at 07:30

## 2024-08-14 RX ADMIN — Medication 1 MILLIGRAM(S): at 10:30

## 2024-08-14 RX ADMIN — Medication 1 MILLIGRAM(S): at 16:43

## 2024-08-14 RX ADMIN — FUROSEMIDE 20 MILLIGRAM(S): 10 INJECTION INTRAMUSCULAR; INTRAVENOUS at 09:20

## 2024-08-14 RX ADMIN — Medication 100 MILLIGRAM(S): at 13:48

## 2024-08-14 RX ADMIN — Medication 62.5 MILLIMOLE(S): at 09:19

## 2024-08-14 RX ADMIN — Medication 2 TABLET(S): at 21:10

## 2024-08-14 RX ADMIN — URSODIOL 300 MILLIGRAM(S): 300 CAPSULE ORAL at 09:19

## 2024-08-14 RX ADMIN — MEROPENEM 1000 MILLIGRAM(S): 1 INJECTION INTRAVENOUS at 13:47

## 2024-08-14 RX ADMIN — ENOXAPARIN SODIUM 40 MILLIGRAM(S): 100 INJECTION SUBCUTANEOUS at 09:19

## 2024-08-14 RX ADMIN — Medication 100 MILLIGRAM(S): at 05:24

## 2024-08-14 RX ADMIN — DEXTROMETHORPHAN HBR, GUAIFENESIN 600 MILLIGRAM(S): 200 LIQUID ORAL at 09:19

## 2024-08-14 RX ADMIN — POLYETHYLENE GLYCOL 3350 17 GRAM(S): 17 POWDER, FOR SOLUTION ORAL at 09:19

## 2024-08-14 RX ADMIN — Medication 1 MILLIGRAM(S): at 06:20

## 2024-08-14 RX ADMIN — Medication 1 MILLIGRAM(S): at 02:04

## 2024-08-14 RX ADMIN — Medication 400 MILLIGRAM(S): at 17:33

## 2024-08-15 ENCOUNTER — RESULT REVIEW (OUTPATIENT)
Age: 49
End: 2024-08-15

## 2024-08-15 LAB
ALBUMIN SERPL ELPH-MCNC: 1.5 G/DL — LOW (ref 3.3–5)
ALP SERPL-CCNC: 227 U/L — HIGH (ref 40–120)
ALT FLD-CCNC: 38 U/L — SIGNIFICANT CHANGE UP (ref 12–78)
AMMONIA BLD-MCNC: 63 UMOL/L — HIGH (ref 11–32)
ANION GAP SERPL CALC-SCNC: 4 MMOL/L — LOW (ref 5–17)
APTT BLD: 32 SEC — SIGNIFICANT CHANGE UP (ref 24.5–35.6)
AST SERPL-CCNC: 239 U/L — HIGH (ref 15–37)
BASOPHILS # BLD AUTO: 0.02 K/UL — SIGNIFICANT CHANGE UP (ref 0–0.2)
BASOPHILS NFR BLD AUTO: 0.3 % — SIGNIFICANT CHANGE UP (ref 0–2)
BILIRUB DIRECT SERPL-MCNC: 12.4 MG/DL — HIGH (ref 0–0.3)
BILIRUB INDIRECT FLD-MCNC: 2.7 MG/DL — HIGH (ref 0.2–1)
BILIRUB SERPL-MCNC: 15.1 MG/DL — HIGH (ref 0.2–1.2)
BUN SERPL-MCNC: 12 MG/DL — SIGNIFICANT CHANGE UP (ref 7–23)
CALCIUM SERPL-MCNC: 8.4 MG/DL — LOW (ref 8.5–10.1)
CHLORIDE SERPL-SCNC: 95 MMOL/L — LOW (ref 96–108)
CO2 SERPL-SCNC: 29 MMOL/L — SIGNIFICANT CHANGE UP (ref 22–31)
CREAT SERPL-MCNC: 0.42 MG/DL — LOW (ref 0.5–1.3)
CRP SERPL-MCNC: 128 MG/L — HIGH
EGFR: 131 ML/MIN/1.73M2 — SIGNIFICANT CHANGE UP
EGFR: 131 ML/MIN/1.73M2 — SIGNIFICANT CHANGE UP
EOSINOPHIL # BLD AUTO: 0.05 K/UL — SIGNIFICANT CHANGE UP (ref 0–0.5)
EOSINOPHIL NFR BLD AUTO: 0.8 % — SIGNIFICANT CHANGE UP (ref 0–6)
FERRITIN SERPL-MCNC: 1304 NG/ML — HIGH (ref 30–400)
FOLATE SERPL-MCNC: 4.4 NG/ML — LOW
GLUCOSE SERPL-MCNC: 109 MG/DL — HIGH (ref 70–99)
HCT VFR BLD CALC: 26.1 % — LOW (ref 39–50)
HGB BLD-MCNC: 8.9 G/DL — LOW (ref 13–17)
IMM GRANULOCYTES NFR BLD AUTO: 0.9 % — SIGNIFICANT CHANGE UP (ref 0–0.9)
INR BLD: 2.16 RATIO — HIGH (ref 0.85–1.18)
IRON SATN MFR SERPL: 14 % — LOW (ref 16–55)
IRON SATN MFR SERPL: 23 UG/DL — LOW (ref 45–165)
LYMPHOCYTES # BLD AUTO: 0.7 K/UL — LOW (ref 1–3.3)
LYMPHOCYTES # BLD AUTO: 11.1 % — LOW (ref 13–44)
MAGNESIUM SERPL-MCNC: 1.6 MG/DL — SIGNIFICANT CHANGE UP (ref 1.6–2.6)
MCHC RBC-ENTMCNC: 32.1 PG — SIGNIFICANT CHANGE UP (ref 27–34)
MCHC RBC-ENTMCNC: 34.1 GM/DL — SIGNIFICANT CHANGE UP (ref 32–36)
MCV RBC AUTO: 94.2 FL — SIGNIFICANT CHANGE UP (ref 80–100)
MONOCYTES # BLD AUTO: 0.69 K/UL — SIGNIFICANT CHANGE UP (ref 0–0.9)
MONOCYTES NFR BLD AUTO: 10.9 % — SIGNIFICANT CHANGE UP (ref 2–14)
NEUTROPHILS # BLD AUTO: 4.81 K/UL — SIGNIFICANT CHANGE UP (ref 1.8–7.4)
NEUTROPHILS NFR BLD AUTO: 76 % — SIGNIFICANT CHANGE UP (ref 43–77)
NT-PROBNP SERPL-SCNC: 191 PG/ML — HIGH (ref 0–125)
OSMOLALITY SERPL: 267 MOSMOL/KG — LOW (ref 275–300)
PHOSPHATE SERPL-MCNC: 1.6 MG/DL — LOW (ref 2.5–4.5)
PLATELET # BLD AUTO: 298 K/UL — SIGNIFICANT CHANGE UP (ref 150–400)
POTASSIUM SERPL-MCNC: 3.7 MMOL/L — SIGNIFICANT CHANGE UP (ref 3.5–5.3)
POTASSIUM SERPL-SCNC: 3.7 MMOL/L — SIGNIFICANT CHANGE UP (ref 3.5–5.3)
PROT SERPL-MCNC: 6.3 GM/DL — SIGNIFICANT CHANGE UP (ref 6–8.3)
PROTHROM AB SERPL-ACNC: 23.9 SEC — HIGH (ref 9.5–13)
RBC # BLD: 2.77 M/UL — LOW (ref 4.2–5.8)
RBC # FLD: 15.6 % — HIGH (ref 10.3–14.5)
SODIUM SERPL-SCNC: 128 MMOL/L — LOW (ref 135–145)
TIBC SERPL-MCNC: 167 UG/DL — LOW (ref 220–430)
UIBC SERPL-MCNC: 144 UG/DL — SIGNIFICANT CHANGE UP (ref 110–370)
VIT B12 SERPL-MCNC: 805 PG/ML — SIGNIFICANT CHANGE UP (ref 232–1245)
WBC # BLD: 6.33 K/UL — SIGNIFICANT CHANGE UP (ref 3.8–10.5)
WBC # FLD AUTO: 6.33 K/UL — SIGNIFICANT CHANGE UP (ref 3.8–10.5)

## 2024-08-15 PROCEDURE — 71045 X-RAY EXAM CHEST 1 VIEW: CPT | Mod: 26

## 2024-08-15 PROCEDURE — 88108 CYTOPATH CONCENTRATE TECH: CPT | Mod: 26

## 2024-08-15 PROCEDURE — 99232 SBSQ HOSP IP/OBS MODERATE 35: CPT

## 2024-08-15 PROCEDURE — 49083 ABD PARACENTESIS W/IMAGING: CPT

## 2024-08-15 PROCEDURE — 49423 EXCHANGE DRAINAGE CATHETER: CPT

## 2024-08-15 PROCEDURE — 88305 TISSUE EXAM BY PATHOLOGIST: CPT | Mod: 26

## 2024-08-15 PROCEDURE — 75984 XRAY CONTROL CATHETER CHANGE: CPT | Mod: 26

## 2024-08-15 RX ORDER — HYDROMORPHONE/SOD CHLOR,ISO/PF 2 MG/10 ML
0.5 SYRINGE (ML) INJECTION
Refills: 0 | Status: DISCONTINUED | OUTPATIENT
Start: 2024-08-15 | End: 2024-08-15

## 2024-08-15 RX ORDER — SODIUM PHOSPHATE,DIBASIC DIHYD
15 POWDER (GRAM) MISCELLANEOUS ONCE
Refills: 0 | Status: COMPLETED | OUTPATIENT
Start: 2024-08-15 | End: 2024-08-15

## 2024-08-15 RX ORDER — SODIUM CHLORIDE 9 G/1000ML
1000 INJECTION, SOLUTION INTRAVENOUS
Refills: 0 | Status: DISCONTINUED | OUTPATIENT
Start: 2024-08-15 | End: 2024-08-15

## 2024-08-15 RX ORDER — HYDROMORPHONE/SOD CHLOR,ISO/PF 2 MG/10 ML
4 SYRINGE (ML) INJECTION EVERY 4 HOURS
Refills: 0 | Status: DISCONTINUED | OUTPATIENT
Start: 2024-08-15 | End: 2024-08-17

## 2024-08-15 RX ORDER — ALBUMIN (HUMAN) 12.5 G/50ML
100 INJECTION, SOLUTION INTRAVENOUS EVERY 12 HOURS
Refills: 0 | Status: DISCONTINUED | OUTPATIENT
Start: 2024-08-15 | End: 2024-08-17

## 2024-08-15 RX ORDER — FENTANYL CITRATE-0.9 % NACL/PF 100MCG/2ML
25 SYRINGE (ML) INTRAVENOUS
Refills: 0 | Status: DISCONTINUED | OUTPATIENT
Start: 2024-08-15 | End: 2024-08-15

## 2024-08-15 RX ORDER — SOD PHOS DI, MONO/K PHOS MONO 250 MG
1 TABLET ORAL THREE TIMES A DAY
Refills: 0 | Status: DISCONTINUED | OUTPATIENT
Start: 2024-08-15 | End: 2024-08-17

## 2024-08-15 RX ORDER — MAGNESIUM SULFATE 500 MG/ML
1 SYRINGE (ML) INJECTION EVERY 6 HOURS
Refills: 0 | Status: COMPLETED | OUTPATIENT
Start: 2024-08-15 | End: 2024-08-15

## 2024-08-15 RX ORDER — ONDANSETRON HCL/PF 4 MG/2 ML
4 VIAL (ML) INJECTION ONCE
Refills: 0 | Status: DISCONTINUED | OUTPATIENT
Start: 2024-08-15 | End: 2024-08-15

## 2024-08-15 RX ORDER — FOLIC ACID 1 MG/1
1 TABLET ORAL DAILY
Refills: 0 | Status: DISCONTINUED | OUTPATIENT
Start: 2024-08-15 | End: 2024-08-17

## 2024-08-15 RX ORDER — HYDROMORPHONE/SOD CHLOR,ISO/PF 2 MG/10 ML
1 SYRINGE (ML) INJECTION EVERY 4 HOURS
Refills: 0 | Status: DISCONTINUED | OUTPATIENT
Start: 2024-08-15 | End: 2024-08-17

## 2024-08-15 RX ADMIN — MEROPENEM 1000 MILLIGRAM(S): 1 INJECTION INTRAVENOUS at 21:14

## 2024-08-15 RX ADMIN — Medication 1 MILLIGRAM(S): at 01:10

## 2024-08-15 RX ADMIN — MEROPENEM 1000 MILLIGRAM(S): 1 INJECTION INTRAVENOUS at 13:00

## 2024-08-15 RX ADMIN — Medication 4 MILLIGRAM(S): at 20:28

## 2024-08-15 RX ADMIN — Medication 1 PATCH: at 07:30

## 2024-08-15 RX ADMIN — Medication 100 GRAM(S): at 17:09

## 2024-08-15 RX ADMIN — DEXTROMETHORPHAN HBR, GUAIFENESIN 600 MILLIGRAM(S): 200 LIQUID ORAL at 21:14

## 2024-08-15 RX ADMIN — Medication 2000 UNIT(S): at 21:13

## 2024-08-15 RX ADMIN — FOLIC ACID 1 MILLIGRAM(S): 1 TABLET ORAL at 18:27

## 2024-08-15 RX ADMIN — URSODIOL 300 MILLIGRAM(S): 300 CAPSULE ORAL at 21:15

## 2024-08-15 RX ADMIN — Medication 1 TABLET(S): at 21:14

## 2024-08-15 RX ADMIN — MEROPENEM 1000 MILLIGRAM(S): 1 INJECTION INTRAVENOUS at 05:05

## 2024-08-15 RX ADMIN — Medication 62.5 MILLIMOLE(S): at 18:27

## 2024-08-15 RX ADMIN — Medication 1 MILLIGRAM(S): at 01:25

## 2024-08-15 RX ADMIN — ALBUMIN (HUMAN) 50 MILLILITER(S): 12.5 INJECTION, SOLUTION INTRAVENOUS at 21:20

## 2024-08-15 RX ADMIN — Medication 1 MILLIGRAM(S): at 05:40

## 2024-08-15 RX ADMIN — Medication 100 MILLIGRAM(S): at 21:13

## 2024-08-15 RX ADMIN — Medication 1 MILLIGRAM(S): at 09:57

## 2024-08-15 RX ADMIN — Medication 1 MILLIGRAM(S): at 05:21

## 2024-08-15 RX ADMIN — Medication 1 MILLIGRAM(S): at 16:49

## 2024-08-15 RX ADMIN — Medication 100 GRAM(S): at 10:49

## 2024-08-15 RX ADMIN — Medication 2.5 MILLIGRAM(S): at 18:27

## 2024-08-15 RX ADMIN — Medication 4 MILLIGRAM(S): at 20:58

## 2024-08-15 RX ADMIN — Medication 1 MILLIGRAM(S): at 09:27

## 2024-08-15 RX ADMIN — Medication 100 MILLIGRAM(S): at 16:49

## 2024-08-15 RX ADMIN — Medication 1 PATCH: at 12:20

## 2024-08-15 RX ADMIN — Medication 400 MILLIGRAM(S): at 16:50

## 2024-08-16 LAB
ALBUMIN FLD-MCNC: 1.2 G/DL — SIGNIFICANT CHANGE UP
ALBUMIN SERPL ELPH-MCNC: 1.8 G/DL — LOW (ref 3.3–5)
ALP SERPL-CCNC: 213 U/L — HIGH (ref 40–120)
ALT FLD-CCNC: 40 U/L — SIGNIFICANT CHANGE UP (ref 12–78)
AMMONIA BLD-MCNC: 61 UMOL/L — HIGH (ref 11–32)
ANION GAP SERPL CALC-SCNC: 4 MMOL/L — LOW (ref 5–17)
AST SERPL-CCNC: 225 U/L — HIGH (ref 15–37)
B PERT IGG+IGM PNL SER: ABNORMAL
BILIRUB DIRECT SERPL-MCNC: 12 MG/DL — HIGH (ref 0–0.3)
BILIRUB INDIRECT FLD-MCNC: 3.1 MG/DL — HIGH (ref 0.2–1)
BILIRUB SERPL-MCNC: 15.1 MG/DL — HIGH (ref 0.2–1.2)
BUN SERPL-MCNC: 10 MG/DL — SIGNIFICANT CHANGE UP (ref 7–23)
CALCIUM SERPL-MCNC: 8.5 MG/DL — SIGNIFICANT CHANGE UP (ref 8.5–10.1)
CHLORIDE SERPL-SCNC: 95 MMOL/L — LOW (ref 96–108)
CO2 SERPL-SCNC: 29 MMOL/L — SIGNIFICANT CHANGE UP (ref 22–31)
COLOR FLD: YELLOW — SIGNIFICANT CHANGE UP
CREAT SERPL-MCNC: 0.46 MG/DL — LOW (ref 0.5–1.3)
CRP SERPL-MCNC: 101 MG/L — HIGH
EGFR: 127 ML/MIN/1.73M2 — SIGNIFICANT CHANGE UP
EGFR: 127 ML/MIN/1.73M2 — SIGNIFICANT CHANGE UP
EOSINOPHIL # FLD: 0 % — SIGNIFICANT CHANGE UP
FLUID INTAKE SUBSTANCE CLASS: SIGNIFICANT CHANGE UP
FOLATE+VIT B12 SERBLD-IMP: 0 % — SIGNIFICANT CHANGE UP
GLUCOSE FLD-MCNC: 110 MG/DL — SIGNIFICANT CHANGE UP
GLUCOSE SERPL-MCNC: 120 MG/DL — HIGH (ref 70–99)
GRAM STN FLD: SIGNIFICANT CHANGE UP
HCT VFR BLD CALC: 25.3 % — LOW (ref 39–50)
HGB BLD-MCNC: 8.5 G/DL — LOW (ref 13–17)
INR BLD: 1.85 RATIO — HIGH (ref 0.85–1.18)
LDH SERPL L TO P-CCNC: 99 U/L — SIGNIFICANT CHANGE UP
LYMPHOCYTES # FLD: 35 % — SIGNIFICANT CHANGE UP
MAGNESIUM SERPL-MCNC: 1.7 MG/DL — SIGNIFICANT CHANGE UP (ref 1.6–2.6)
MCHC RBC-ENTMCNC: 31.8 PG — SIGNIFICANT CHANGE UP (ref 27–34)
MCHC RBC-ENTMCNC: 33.6 GM/DL — SIGNIFICANT CHANGE UP (ref 32–36)
MCV RBC AUTO: 94.8 FL — SIGNIFICANT CHANGE UP (ref 80–100)
MESOTHL CELL # FLD: 1 % — SIGNIFICANT CHANGE UP
MONOS+MACROS # FLD: 45 % — SIGNIFICANT CHANGE UP
NEUTROPHILS-BODY FLUID: 19 % — SIGNIFICANT CHANGE UP
NRBC # FLD: 0 % — SIGNIFICANT CHANGE UP
NT-PROBNP SERPL-SCNC: 196 PG/ML — HIGH (ref 0–125)
OTHER CELLS FLD MANUAL: 0 % — SIGNIFICANT CHANGE UP
PHOSPHATE SERPL-MCNC: 1.9 MG/DL — LOW (ref 2.5–4.5)
PLATELET # BLD AUTO: 284 K/UL — SIGNIFICANT CHANGE UP (ref 150–400)
POTASSIUM SERPL-MCNC: 3.8 MMOL/L — SIGNIFICANT CHANGE UP (ref 3.5–5.3)
POTASSIUM SERPL-SCNC: 3.8 MMOL/L — SIGNIFICANT CHANGE UP (ref 3.5–5.3)
PROT FLD-MCNC: 1.8 G/DL — SIGNIFICANT CHANGE UP
PROT SERPL-MCNC: 6.2 GM/DL — SIGNIFICANT CHANGE UP (ref 6–8.3)
PROTHROM AB SERPL-ACNC: 20.5 SEC — HIGH (ref 9.5–13)
RBC # BLD: 2.67 M/UL — LOW (ref 4.2–5.8)
RBC # FLD: 15.8 % — HIGH (ref 10.3–14.5)
RCV VOL RI: 4000 /UL — HIGH (ref 0–0)
SODIUM SERPL-SCNC: 128 MMOL/L — LOW (ref 135–145)
SPECIMEN SOURCE: SIGNIFICANT CHANGE UP
TOTAL NUCLEATED CELL COUNT, BODY FLUID: 666 /UL — SIGNIFICANT CHANGE UP
TROPONIN I, HIGH SENSITIVITY RESULT: <3 NG/L — SIGNIFICANT CHANGE UP
TUBE TYPE: SIGNIFICANT CHANGE UP
WBC # BLD: 5.54 K/UL — SIGNIFICANT CHANGE UP (ref 3.8–10.5)
WBC # FLD AUTO: 5.54 K/UL — SIGNIFICANT CHANGE UP (ref 3.8–10.5)

## 2024-08-16 PROCEDURE — 99231 SBSQ HOSP IP/OBS SF/LOW 25: CPT

## 2024-08-16 PROCEDURE — 99232 SBSQ HOSP IP/OBS MODERATE 35: CPT

## 2024-08-16 RX ORDER — RIFAXIMIN 550 MG/1
1 TABLET ORAL
Qty: 60 | Refills: 0
Start: 2024-08-16 | End: 2024-09-14

## 2024-08-16 RX ORDER — HYDROCODONE/HOMATROPINE
5 SYRUP ORAL
Qty: 35 | Refills: 0
Start: 2024-08-16 | End: 2024-08-22

## 2024-08-16 RX ORDER — METRONIDAZOLE 250 MG
1 TABLET ORAL
Qty: 20 | Refills: 0
Start: 2024-08-16 | End: 2024-08-25

## 2024-08-16 RX ORDER — SODIUM PHOSPHATE,DIBASIC DIHYD
15 POWDER (GRAM) MISCELLANEOUS ONCE
Refills: 0 | Status: COMPLETED | OUTPATIENT
Start: 2024-08-16 | End: 2024-08-16

## 2024-08-16 RX ORDER — METRONIDAZOLE 250 MG
1 TABLET ORAL
Qty: 20 | Refills: 0 | DISCHARGE
Start: 2024-08-16 | End: 2024-08-25

## 2024-08-16 RX ORDER — BENZONATATE 100 MG
1 CAPSULE ORAL
Qty: 90 | Refills: 0 | DISCHARGE
Start: 2024-08-16 | End: 2024-09-14

## 2024-08-16 RX ORDER — HYDROMORPHONE/SOD CHLOR,ISO/PF 2 MG/10 ML
1 SYRINGE (ML) INJECTION
Qty: 30 | Refills: 0
Start: 2024-08-16 | End: 2024-08-20

## 2024-08-16 RX ORDER — POLYETHYLENE GLYCOL 3350 17 G/17G
17 POWDER, FOR SOLUTION ORAL
Qty: 0 | Refills: 0 | DISCHARGE
Start: 2024-08-16

## 2024-08-16 RX ORDER — FOLIC ACID 1 MG/1
1 TABLET ORAL
Qty: 30 | Refills: 0
Start: 2024-08-16 | End: 2024-09-14

## 2024-08-16 RX ORDER — METRONIDAZOLE 250 MG
500 TABLET ORAL EVERY 12 HOURS
Refills: 0 | Status: DISCONTINUED | OUTPATIENT
Start: 2024-08-17 | End: 2024-08-17

## 2024-08-16 RX ORDER — URSODIOL 300 MG/1
1 CAPSULE ORAL
Qty: 60 | Refills: 0
Start: 2024-08-16 | End: 2024-09-14

## 2024-08-16 RX ORDER — MAGNESIUM OXIDE 400 MG
1 TABLET ORAL
Qty: 60 | Refills: 0
Start: 2024-08-16 | End: 2024-09-14

## 2024-08-16 RX ORDER — LEVOFLOXACIN 25 MG/ML
1 SOLUTION ORAL
Qty: 10 | Refills: 0
Start: 2024-08-16 | End: 2024-08-25

## 2024-08-16 RX ORDER — SOD PHOS DI, MONO/K PHOS MONO 250 MG
1 TABLET ORAL
Qty: 10 | Refills: 0
Start: 2024-08-16 | End: 2024-08-20

## 2024-08-16 RX ORDER — BENZONATATE 100 MG
1 CAPSULE ORAL
Qty: 90 | Refills: 0
Start: 2024-08-16 | End: 2024-09-14

## 2024-08-16 RX ORDER — SODIUM PHOSPHATE, DIBASIC, ANHYDROUS, POTASSIUM PHOSPHATE, MONOBASIC, AND SODIUM PHOSPHATE, MONOBASIC, MONOHYDRATE 852; 155; 130 MG/1; MG/1; MG/1
1 TABLET, COATED ORAL
Qty: 10 | Refills: 0 | DISCHARGE
Start: 2024-08-16 | End: 2024-08-20

## 2024-08-16 RX ORDER — BISACODYL 5 MG
1 TABLET, DELAYED RELEASE (ENTERIC COATED) ORAL
Qty: 0 | Refills: 0 | DISCHARGE
Start: 2024-08-16

## 2024-08-16 RX ORDER — RIFAXIMIN 550 MG/1
1 TABLET ORAL
Qty: 10 | Refills: 0
Start: 2024-08-16 | End: 2024-08-20

## 2024-08-16 RX ORDER — LEVOFLOXACIN 5 MG/ML
1 INJECTION, SOLUTION INTRAVENOUS
Qty: 10 | Refills: 0 | DISCHARGE
Start: 2024-08-16 | End: 2024-08-25

## 2024-08-16 RX ADMIN — ENOXAPARIN SODIUM 40 MILLIGRAM(S): 100 INJECTION SUBCUTANEOUS at 09:00

## 2024-08-16 RX ADMIN — Medication 4 MILLIGRAM(S): at 02:22

## 2024-08-16 RX ADMIN — MEROPENEM 1000 MILLIGRAM(S): 1 INJECTION INTRAVENOUS at 14:00

## 2024-08-16 RX ADMIN — Medication 4 MILLIGRAM(S): at 01:52

## 2024-08-16 RX ADMIN — Medication 400 MILLIGRAM(S): at 09:02

## 2024-08-16 RX ADMIN — Medication 2000 UNIT(S): at 21:24

## 2024-08-16 RX ADMIN — URSODIOL 300 MILLIGRAM(S): 300 CAPSULE ORAL at 09:02

## 2024-08-16 RX ADMIN — Medication 100 MILLIGRAM(S): at 13:57

## 2024-08-16 RX ADMIN — Medication 1 TABLET(S): at 21:25

## 2024-08-16 RX ADMIN — Medication 4 MILLIGRAM(S): at 21:58

## 2024-08-16 RX ADMIN — MEROPENEM 1000 MILLIGRAM(S): 1 INJECTION INTRAVENOUS at 05:19

## 2024-08-16 RX ADMIN — Medication 100 MILLIGRAM(S): at 05:16

## 2024-08-16 RX ADMIN — Medication 500 MILLILITER(S): at 10:18

## 2024-08-16 RX ADMIN — Medication 4 MILLIGRAM(S): at 21:28

## 2024-08-16 RX ADMIN — Medication 1 TABLET(S): at 13:58

## 2024-08-16 RX ADMIN — DEXTROMETHORPHAN HBR, GUAIFENESIN 600 MILLIGRAM(S): 200 LIQUID ORAL at 09:02

## 2024-08-16 RX ADMIN — FOLIC ACID 1 MILLIGRAM(S): 1 TABLET ORAL at 09:02

## 2024-08-16 RX ADMIN — ALBUMIN (HUMAN) 50 MILLILITER(S): 12.5 INJECTION, SOLUTION INTRAVENOUS at 09:01

## 2024-08-16 RX ADMIN — POLYETHYLENE GLYCOL 3350 17 GRAM(S): 17 POWDER, FOR SOLUTION ORAL at 09:01

## 2024-08-16 RX ADMIN — Medication 4 MILLIGRAM(S): at 16:20

## 2024-08-16 RX ADMIN — Medication 62.5 MILLIMOLE(S): at 11:23

## 2024-08-16 RX ADMIN — Medication 100 MILLIGRAM(S): at 21:23

## 2024-08-16 RX ADMIN — URSODIOL 300 MILLIGRAM(S): 300 CAPSULE ORAL at 21:26

## 2024-08-16 RX ADMIN — Medication 400 MILLIGRAM(S): at 18:15

## 2024-08-16 RX ADMIN — Medication 1 TABLET(S): at 06:35

## 2024-08-16 RX ADMIN — ALBUMIN (HUMAN) 50 MILLILITER(S): 12.5 INJECTION, SOLUTION INTRAVENOUS at 21:26

## 2024-08-16 RX ADMIN — MEROPENEM 1000 MILLIGRAM(S): 1 INJECTION INTRAVENOUS at 21:25

## 2024-08-16 RX ADMIN — Medication 4 MILLIGRAM(S): at 15:50

## 2024-08-16 RX ADMIN — DEXTROMETHORPHAN HBR, GUAIFENESIN 600 MILLIGRAM(S): 200 LIQUID ORAL at 21:24

## 2024-08-17 ENCOUNTER — TRANSCRIPTION ENCOUNTER (OUTPATIENT)
Age: 49
End: 2024-08-17

## 2024-08-17 VITALS
OXYGEN SATURATION: 98 % | DIASTOLIC BLOOD PRESSURE: 84 MMHG | RESPIRATION RATE: 18 BRPM | TEMPERATURE: 98 F | SYSTOLIC BLOOD PRESSURE: 120 MMHG | HEART RATE: 105 BPM

## 2024-08-17 LAB
ALBUMIN SERPL ELPH-MCNC: 2.1 G/DL — LOW (ref 3.3–5)
ALP SERPL-CCNC: 195 U/L — HIGH (ref 40–120)
ALT FLD-CCNC: 44 U/L — SIGNIFICANT CHANGE UP (ref 12–78)
ANION GAP SERPL CALC-SCNC: 5 MMOL/L — SIGNIFICANT CHANGE UP (ref 5–17)
AST SERPL-CCNC: 219 U/L — HIGH (ref 15–37)
BILIRUB DIRECT SERPL-MCNC: 13.4 MG/DL — HIGH (ref 0–0.3)
BILIRUB INDIRECT FLD-MCNC: 4.8 MG/DL — HIGH (ref 0.2–1)
BILIRUB SERPL-MCNC: 18.2 MG/DL — HIGH (ref 0.2–1.2)
BUN SERPL-MCNC: 7 MG/DL — SIGNIFICANT CHANGE UP (ref 7–23)
CALCIUM SERPL-MCNC: 8.5 MG/DL — SIGNIFICANT CHANGE UP (ref 8.5–10.1)
CHLORIDE SERPL-SCNC: 98 MMOL/L — SIGNIFICANT CHANGE UP (ref 96–108)
CO2 SERPL-SCNC: 29 MMOL/L — SIGNIFICANT CHANGE UP (ref 22–31)
CREAT SERPL-MCNC: 0.47 MG/DL — LOW (ref 0.5–1.3)
CRP SERPL-MCNC: 92 MG/L — HIGH
CULTURE RESULTS: SIGNIFICANT CHANGE UP
CULTURE RESULTS: SIGNIFICANT CHANGE UP
EGFR: 127 ML/MIN/1.73M2 — SIGNIFICANT CHANGE UP
EGFR: 127 ML/MIN/1.73M2 — SIGNIFICANT CHANGE UP
GLUCOSE SERPL-MCNC: 111 MG/DL — HIGH (ref 70–99)
HCT VFR BLD CALC: 26.3 % — LOW (ref 39–50)
HGB BLD-MCNC: 8.6 G/DL — LOW (ref 13–17)
INR BLD: 1.53 RATIO — HIGH (ref 0.85–1.18)
MAGNESIUM SERPL-MCNC: 1.6 MG/DL — SIGNIFICANT CHANGE UP (ref 1.6–2.6)
MCHC RBC-ENTMCNC: 31.2 PG — SIGNIFICANT CHANGE UP (ref 27–34)
MCHC RBC-ENTMCNC: 32.7 GM/DL — SIGNIFICANT CHANGE UP (ref 32–36)
MCV RBC AUTO: 95.3 FL — SIGNIFICANT CHANGE UP (ref 80–100)
OSMOLALITY SERPL: 266 MOSMOL/KG — LOW (ref 275–300)
PHOSPHATE SERPL-MCNC: 1.7 MG/DL — LOW (ref 2.5–4.5)
PLATELET # BLD AUTO: 299 K/UL — SIGNIFICANT CHANGE UP (ref 150–400)
POTASSIUM SERPL-MCNC: 3.8 MMOL/L — SIGNIFICANT CHANGE UP (ref 3.5–5.3)
POTASSIUM SERPL-SCNC: 3.8 MMOL/L — SIGNIFICANT CHANGE UP (ref 3.5–5.3)
PROT SERPL-MCNC: 6.3 GM/DL — SIGNIFICANT CHANGE UP (ref 6–8.3)
PROTHROM AB SERPL-ACNC: 17.1 SEC — HIGH (ref 9.5–13)
RBC # BLD: 2.76 M/UL — LOW (ref 4.2–5.8)
RBC # FLD: 15.7 % — HIGH (ref 10.3–14.5)
SODIUM SERPL-SCNC: 132 MMOL/L — LOW (ref 135–145)
SPECIMEN SOURCE: SIGNIFICANT CHANGE UP
SPECIMEN SOURCE: SIGNIFICANT CHANGE UP
WBC # BLD: 5.82 K/UL — SIGNIFICANT CHANGE UP (ref 3.8–10.5)
WBC # FLD AUTO: 5.82 K/UL — SIGNIFICANT CHANGE UP (ref 3.8–10.5)

## 2024-08-17 PROCEDURE — 99239 HOSP IP/OBS DSCHRG MGMT >30: CPT

## 2024-08-17 RX ADMIN — ENOXAPARIN SODIUM 40 MILLIGRAM(S): 100 INJECTION SUBCUTANEOUS at 09:15

## 2024-08-17 RX ADMIN — Medication 500 MILLIGRAM(S): at 09:16

## 2024-08-17 RX ADMIN — DEXTROMETHORPHAN HBR, GUAIFENESIN 600 MILLIGRAM(S): 200 LIQUID ORAL at 09:15

## 2024-08-17 RX ADMIN — Medication 400 MILLIGRAM(S): at 09:15

## 2024-08-17 RX ADMIN — Medication 4 MILLIGRAM(S): at 04:55

## 2024-08-17 RX ADMIN — Medication 100 MILLIGRAM(S): at 04:58

## 2024-08-17 RX ADMIN — FOLIC ACID 1 MILLIGRAM(S): 1 TABLET ORAL at 09:14

## 2024-08-17 RX ADMIN — Medication 1 TABLET(S): at 04:57

## 2024-08-17 RX ADMIN — Medication 4 MILLIGRAM(S): at 11:27

## 2024-08-17 RX ADMIN — ALBUMIN (HUMAN) 50 MILLILITER(S): 12.5 INJECTION, SOLUTION INTRAVENOUS at 09:33

## 2024-08-17 RX ADMIN — URSODIOL 300 MILLIGRAM(S): 300 CAPSULE ORAL at 09:15

## 2024-08-17 RX ADMIN — Medication 650 MILLIGRAM(S): at 06:21

## 2024-08-19 LAB — NON-GYNECOLOGICAL CYTOLOGY STUDY: SIGNIFICANT CHANGE UP

## 2024-08-21 LAB
CULTURE RESULTS: SIGNIFICANT CHANGE UP
SPECIMEN SOURCE: SIGNIFICANT CHANGE UP

## 2024-08-24 DIAGNOSIS — C18.9 MALIGNANT NEOPLASM OF COLON, UNSPECIFIED: ICD-10-CM

## 2024-08-24 DIAGNOSIS — E87.6 HYPOKALEMIA: ICD-10-CM

## 2024-08-24 DIAGNOSIS — C78.7 SECONDARY MALIGNANT NEOPLASM OF LIVER AND INTRAHEPATIC BILE DUCT: ICD-10-CM

## 2024-08-24 DIAGNOSIS — E83.42 HYPOMAGNESEMIA: ICD-10-CM

## 2024-08-24 DIAGNOSIS — C78.00 SECONDARY MALIGNANT NEOPLASM OF UNSPECIFIED LUNG: ICD-10-CM

## 2024-08-24 DIAGNOSIS — I10 ESSENTIAL (PRIMARY) HYPERTENSION: ICD-10-CM

## 2024-08-24 DIAGNOSIS — E83.39 OTHER DISORDERS OF PHOSPHORUS METABOLISM: ICD-10-CM

## 2024-08-24 DIAGNOSIS — D63.0 ANEMIA IN NEOPLASTIC DISEASE: ICD-10-CM

## 2024-08-24 DIAGNOSIS — K72.90 HEPATIC FAILURE, UNSPECIFIED WITHOUT COMA: ICD-10-CM

## 2024-08-24 DIAGNOSIS — D68.8 OTHER SPECIFIED COAGULATION DEFECTS: ICD-10-CM

## 2024-08-24 DIAGNOSIS — K83.8 OTHER SPECIFIED DISEASES OF BILIARY TRACT: ICD-10-CM

## 2024-08-24 DIAGNOSIS — N39.0 URINARY TRACT INFECTION, SITE NOT SPECIFIED: ICD-10-CM

## 2024-08-24 DIAGNOSIS — C78.6 SECONDARY MALIGNANT NEOPLASM OF RETROPERITONEUM AND PERITONEUM: ICD-10-CM

## 2024-08-24 DIAGNOSIS — E83.51 HYPOCALCEMIA: ICD-10-CM

## 2024-08-24 DIAGNOSIS — C78.1 SECONDARY MALIGNANT NEOPLASM OF MEDIASTINUM: ICD-10-CM

## 2024-08-24 DIAGNOSIS — K83.09 OTHER CHOLANGITIS: ICD-10-CM

## 2024-08-24 DIAGNOSIS — A41.59 OTHER GRAM-NEGATIVE SEPSIS: ICD-10-CM

## 2024-08-24 DIAGNOSIS — D84.9 IMMUNODEFICIENCY, UNSPECIFIED: ICD-10-CM

## 2024-08-24 DIAGNOSIS — K83.1 OBSTRUCTION OF BILE DUCT: ICD-10-CM

## 2024-08-24 DIAGNOSIS — C78.89 SECONDARY MALIGNANT NEOPLASM OF OTHER DIGESTIVE ORGANS: ICD-10-CM

## 2024-08-24 DIAGNOSIS — E87.1 HYPO-OSMOLALITY AND HYPONATREMIA: ICD-10-CM

## 2024-08-26 RX ORDER — HYDROMORPHONE/SOD CHLOR,ISO/PF 2 MG/10 ML
1 SYRINGE (ML) INJECTION
Qty: 30 | Refills: 0
Start: 2024-08-26 | End: 2024-08-30

## 2024-08-28 ENCOUNTER — INPATIENT (INPATIENT)
Facility: HOSPITAL | Age: 49
LOS: 7 days | Discharge: HOME | DRG: 853 | End: 2024-09-05
Attending: FAMILY MEDICINE | Admitting: INTERNAL MEDICINE
Payer: COMMERCIAL

## 2024-08-28 VITALS — HEIGHT: 73 IN

## 2024-08-28 DIAGNOSIS — Z90.49 ACQUIRED ABSENCE OF OTHER SPECIFIED PARTS OF DIGESTIVE TRACT: Chronic | ICD-10-CM

## 2024-08-28 DIAGNOSIS — Z98.890 OTHER SPECIFIED POSTPROCEDURAL STATES: Chronic | ICD-10-CM

## 2024-08-28 LAB
ALBUMIN SERPL ELPH-MCNC: 2 G/DL — LOW (ref 3.3–5)
ALP SERPL-CCNC: 256 U/L — HIGH (ref 40–120)
ALT FLD-CCNC: 32 U/L — SIGNIFICANT CHANGE UP (ref 12–78)
ANION GAP SERPL CALC-SCNC: 7 MMOL/L — SIGNIFICANT CHANGE UP (ref 5–17)
APTT BLD: 34.8 SEC — SIGNIFICANT CHANGE UP (ref 24.5–35.6)
AST SERPL-CCNC: 230 U/L — HIGH (ref 15–37)
BASOPHILS # BLD AUTO: 0.02 K/UL — SIGNIFICANT CHANGE UP (ref 0–0.2)
BASOPHILS NFR BLD AUTO: 0.2 % — SIGNIFICANT CHANGE UP (ref 0–2)
BILIRUB SERPL-MCNC: 19.1 MG/DL — HIGH (ref 0.2–1.2)
BUN SERPL-MCNC: 14 MG/DL — SIGNIFICANT CHANGE UP (ref 7–23)
CALCIUM SERPL-MCNC: 8.4 MG/DL — LOW (ref 8.5–10.1)
CHLORIDE SERPL-SCNC: 97 MMOL/L — SIGNIFICANT CHANGE UP (ref 96–108)
CO2 SERPL-SCNC: 26 MMOL/L — SIGNIFICANT CHANGE UP (ref 22–31)
CREAT SERPL-MCNC: 0.69 MG/DL — SIGNIFICANT CHANGE UP (ref 0.5–1.3)
EGFR: 113 ML/MIN/1.73M2 — SIGNIFICANT CHANGE UP
EOSINOPHIL # BLD AUTO: 0.02 K/UL — SIGNIFICANT CHANGE UP (ref 0–0.5)
EOSINOPHIL NFR BLD AUTO: 0.2 % — SIGNIFICANT CHANGE UP (ref 0–6)
FLUAV AG NPH QL: SIGNIFICANT CHANGE UP
FLUBV AG NPH QL: SIGNIFICANT CHANGE UP
GLUCOSE SERPL-MCNC: 124 MG/DL — HIGH (ref 70–99)
HCT VFR BLD CALC: 30.7 % — LOW (ref 39–50)
HGB BLD-MCNC: 10.3 G/DL — LOW (ref 13–17)
IMM GRANULOCYTES NFR BLD AUTO: 1 % — HIGH (ref 0–0.9)
INR BLD: 2.33 RATIO — HIGH (ref 0.85–1.18)
LACTATE SERPL-SCNC: 2.7 MMOL/L — HIGH (ref 0.7–2)
LYMPHOCYTES # BLD AUTO: 0.84 K/UL — LOW (ref 1–3.3)
LYMPHOCYTES # BLD AUTO: 8.2 % — LOW (ref 13–44)
MCHC RBC-ENTMCNC: 32 PG — SIGNIFICANT CHANGE UP (ref 27–34)
MCHC RBC-ENTMCNC: 33.6 GM/DL — SIGNIFICANT CHANGE UP (ref 32–36)
MCV RBC AUTO: 95.3 FL — SIGNIFICANT CHANGE UP (ref 80–100)
MONOCYTES # BLD AUTO: 0.79 K/UL — SIGNIFICANT CHANGE UP (ref 0–0.9)
MONOCYTES NFR BLD AUTO: 7.7 % — SIGNIFICANT CHANGE UP (ref 2–14)
NEUTROPHILS # BLD AUTO: 8.43 K/UL — HIGH (ref 1.8–7.4)
NEUTROPHILS NFR BLD AUTO: 82.7 % — HIGH (ref 43–77)
PLATELET # BLD AUTO: 271 K/UL — SIGNIFICANT CHANGE UP (ref 150–400)
POTASSIUM SERPL-MCNC: 4.1 MMOL/L — SIGNIFICANT CHANGE UP (ref 3.5–5.3)
POTASSIUM SERPL-SCNC: 4.1 MMOL/L — SIGNIFICANT CHANGE UP (ref 3.5–5.3)
PROT SERPL-MCNC: 6.9 GM/DL — SIGNIFICANT CHANGE UP (ref 6–8.3)
PROTHROM AB SERPL-ACNC: 25.7 SEC — HIGH (ref 9.5–13)
RBC # BLD: 3.22 M/UL — LOW (ref 4.2–5.8)
RBC # FLD: 16.7 % — HIGH (ref 10.3–14.5)
RSV RNA NPH QL NAA+NON-PROBE: SIGNIFICANT CHANGE UP
SARS-COV-2 RNA SPEC QL NAA+PROBE: SIGNIFICANT CHANGE UP
SODIUM SERPL-SCNC: 130 MMOL/L — LOW (ref 135–145)
WBC # BLD: 10.2 K/UL — SIGNIFICANT CHANGE UP (ref 3.8–10.5)
WBC # FLD AUTO: 10.2 K/UL — SIGNIFICANT CHANGE UP (ref 3.8–10.5)

## 2024-08-28 PROCEDURE — 71045 X-RAY EXAM CHEST 1 VIEW: CPT | Mod: 26

## 2024-08-28 PROCEDURE — 99285 EMERGENCY DEPT VISIT HI MDM: CPT

## 2024-08-28 PROCEDURE — 93010 ELECTROCARDIOGRAM REPORT: CPT

## 2024-08-28 PROCEDURE — 74177 CT ABD & PELVIS W/CONTRAST: CPT | Mod: 26,MC

## 2024-08-28 RX ORDER — MEROPENEM 500 MG/10ML
1000 INJECTION, POWDER, FOR SOLUTION INTRAVENOUS ONCE
Refills: 0 | Status: DISCONTINUED | OUTPATIENT
Start: 2024-08-28 | End: 2024-08-28

## 2024-08-28 RX ORDER — SODIUM CHLORIDE 9 MG/ML
2500 INJECTION INTRAMUSCULAR; INTRAVENOUS; SUBCUTANEOUS ONCE
Refills: 0 | Status: COMPLETED | OUTPATIENT
Start: 2024-08-28 | End: 2024-08-28

## 2024-08-28 RX ORDER — SODIUM CHLORIDE 9 MG/ML
3100 INJECTION INTRAMUSCULAR; INTRAVENOUS; SUBCUTANEOUS ONCE
Refills: 0 | Status: COMPLETED | OUTPATIENT
Start: 2024-08-28 | End: 2024-08-28

## 2024-08-28 RX ORDER — ACETAMINOPHEN 325 MG/1
1000 TABLET ORAL ONCE
Refills: 0 | Status: COMPLETED | OUTPATIENT
Start: 2024-08-28 | End: 2024-08-28

## 2024-08-28 RX ORDER — VANCOMYCIN/0.9 % SOD CHLORIDE 1.75G/25
1500 PLASTIC BAG, INJECTION (ML) INTRAVENOUS ONCE
Refills: 0 | Status: COMPLETED | OUTPATIENT
Start: 2024-08-28 | End: 2024-08-28

## 2024-08-28 RX ORDER — PIPERACILLIN SODIUM AND TAZOBACTAM SODIUM 3; .375 G/15ML; G/15ML
3.38 INJECTION, POWDER, FOR SOLUTION INTRAVENOUS ONCE
Refills: 0 | Status: COMPLETED | OUTPATIENT
Start: 2024-08-28 | End: 2024-08-28

## 2024-08-28 RX ADMIN — SODIUM CHLORIDE 3100 MILLILITER(S): 9 INJECTION INTRAMUSCULAR; INTRAVENOUS; SUBCUTANEOUS at 22:24

## 2024-08-28 RX ADMIN — ACETAMINOPHEN 400 MILLIGRAM(S): 325 TABLET ORAL at 22:26

## 2024-08-28 RX ADMIN — Medication 500 MILLIGRAM(S): at 23:57

## 2024-08-28 RX ADMIN — PIPERACILLIN SODIUM AND TAZOBACTAM SODIUM 200 GRAM(S): 3; .375 INJECTION, POWDER, FOR SOLUTION INTRAVENOUS at 23:03

## 2024-08-28 NOTE — ED ADULT NURSE NOTE - OBJECTIVE STATEMENT
The pt is a 50 y/o male A&OX4 ambulatory at baseline presenting to the ED c/o fever that started today. Pt reports he has a hx of of colon cancer with mets and not on chemo. Pt has bilateral biliary drains and the R drain was place three weeks ago, but endorsed decreased output and leaking x 1 week. Pt took Aleve PTA. Upon assessment pt noted to be jaundice. Respirations even and unlabored, no distress noted. Pt placed in gown and on a cardiac monitor.

## 2024-08-28 NOTE — ED PROVIDER NOTE - PHYSICAL EXAMINATION
Significant jaundice and scleral icterus, abdomen is distended with ascites, patient has bilateral biloma drains in the left upper quadrant and right upper quadrant with scant drainage.  Lungs are clear to auscultation bilaterally on cardiac exam, patient has tachycardia but regular rhythm

## 2024-08-28 NOTE — ED ADULT TRIAGE NOTE - CHIEF COMPLAINT QUOTE
Pt complaining of fever x 1 day. Tmax 101.8. Pt has hx of colon cancer with mets. Pt not on chemo. Pt has bilateral biliary drains. Pt endorses R drain was place three weeks ago, but endorsed decreased output and leaking x 1 week. Pt took Aleve PTA. Pt noted to be jaundice.

## 2024-08-28 NOTE — ED PROVIDER NOTE - CLINICAL SUMMARY MEDICAL DECISION MAKING FREE TEXT BOX
Subjective:    - Summary: The patient came to ER due to persistent fever and issues with bioloma drains. The patient reported that the drainage has decreased significantly and there is leakage around the drain insertion site. Patient was discharged from the same facility earlier today.    - Chief Complaint (CC): Significant decrease in bioloma drainage, with occurrence of leakage around drain insertion site and persistent fever.    - History of Present Illness: The patient with history of colon cancer, recently had drains placement due to a bioloma. The patient reported that in first few days post procedure, the drainage was functioning well but then it started to leak more from around the drain insertion site and the volume of drainage decreased to less than 20 ml per day. The patient also mentioned intermittent fever, recently managed at home with Naproxen, which relieved the fever symptoms. However, the patient is concerned about the decreased drainage and leakage from around the drain insertion site.    - Past Medical History: Significant for colon cancer. The patient was under care of their Oncologist at [facility] for pending initiation of chemotherapy. Recent drain placements at the facility due to bioloma. No other chronic illnesses, past treatments, hospitalizations or medications were mentioned in provided conversation.    - Past Surgical History: Recent history of drains placement due to bioloma. No other past surgical history was mentioned in provided conversation.    - Family History: Family history was not mentioned in the provided conversation.    - Social History: Social history including substance use, sexual history, hobbies was not mentioned in the provided conversation.    - Review of Systems: Overall, appears unwell. Reported persistent fever managed temporarily with use of over the counter Naproxen.    - General: Reported fever that was temporarily managed with Naproxen.    - Neurological: No neurological symptoms were mentioned in the provided conversation.    - Musculoskeletal: No musculoskeletal symptoms were mentioned in the provided conversation.    - Cardiovascular: No cardiovascular symptoms were mentioned in the provided conversation.    - Respiratory: No breathing difficulties or respiratory symptoms were mentioned in the provided conversation.    - Gastrointestinal: Presently concerned with decreased output from bioloma drains and leakage around the drain site.    - Genitourinary: No genitourinary symptoms were reported in the provided conversation.    - Integumentary: Observable leakage from the drain's site.    - Psychiatric: No psychiatric symptoms were reported in the provided conversation.    - Medications: The only mentioned medication is over the counter Naproxen taken for fever.    - Allergies: No allergies were reported in the provided conversation.     Objective:    - Diagnostic Results: The information about diagnostic results is pending and not mentioned in the provided conversation.    - Vital Signs: Vital signs not assessed in the provided conversation.    - Physical Examination (PE): Physical examination revleaed a potentially problematic drain site with observable leakage.     Assessment and Plan:    - Suspected Sepsis and Bioloma Drain Management: Patient presented with persistent fever, managed with Naproxen. History of recent bioloma drainage. Current complaint involves decrease in drainage and leakage around the drain site. These symptoms raise suspicion of an infection possibly leading towards sepsis. Immediate management involved administration of Zosyn antibiotic.      - Therapeutic Interventions: Initiation of Zosyn to immediately counter possible infection responsible for sustained fever. Further treatment plan to be decided post-diagnostic results.       - Diagnostic Tests: Order complete blood work, including lactate level check and conduct a Cat Scan to further investigate the internal scenario and existing blood collection.       - Referrals: Notify the radiology team and discuss possible drain removal depending on the drain function.       - Patient Education: Inform the patient regarding the tentative plan of admitting to [facility], possibility of drain removal upon consultation with the radiology team and the need to start treatment post managing the current infection.       - Follow-Up: Admit the patient to the [facility] to ensure close monitoring, timely diagnostics and immediate action based on results.

## 2024-08-29 DIAGNOSIS — C78.7 SECONDARY MALIGNANT NEOPLASM OF LIVER AND INTRAHEPATIC BILE DUCT: ICD-10-CM

## 2024-08-29 DIAGNOSIS — A41.9 SEPSIS, UNSPECIFIED ORGANISM: ICD-10-CM

## 2024-08-29 LAB
APPEARANCE UR: CLEAR — SIGNIFICANT CHANGE UP
BACTERIA # UR AUTO: ABNORMAL /HPF
BILIRUB UR-MCNC: SIGNIFICANT CHANGE UP
COLOR SPEC: ABNORMAL
DIFF PNL FLD: SIGNIFICANT CHANGE UP
EPI CELLS # UR: PRESENT
GLUCOSE UR QL: SIGNIFICANT CHANGE UP
KETONES UR-MCNC: SIGNIFICANT CHANGE UP
LACTATE SERPL-SCNC: 1.8 MMOL/L — SIGNIFICANT CHANGE UP (ref 0.7–2)
LEUKOCYTE ESTERASE UR-ACNC: SIGNIFICANT CHANGE UP
NITRITE UR-MCNC: SIGNIFICANT CHANGE UP
PH UR: SIGNIFICANT CHANGE UP (ref 5–8)
PROT UR-MCNC: SIGNIFICANT CHANGE UP
RBC CASTS # UR COMP ASSIST: 2 /HPF — SIGNIFICANT CHANGE UP (ref 0–4)
SP GR SPEC: SIGNIFICANT CHANGE UP (ref 1–1.03)
UROBILINOGEN FLD QL: SIGNIFICANT CHANGE UP (ref 0.2–1)
WBC UR QL: 4 /HPF — SIGNIFICANT CHANGE UP (ref 0–5)

## 2024-08-29 PROCEDURE — 84157 ASSAY OF PROTEIN OTHER: CPT

## 2024-08-29 PROCEDURE — 82042 OTHER SOURCE ALBUMIN QUAN EA: CPT

## 2024-08-29 PROCEDURE — 82105 ALPHA-FETOPROTEIN SERUM: CPT

## 2024-08-29 PROCEDURE — 82945 GLUCOSE OTHER FLUID: CPT

## 2024-08-29 PROCEDURE — 87186 SC STD MICRODIL/AGAR DIL: CPT

## 2024-08-29 PROCEDURE — 87077 CULTURE AEROBIC IDENTIFY: CPT

## 2024-08-29 PROCEDURE — 99497 ADVNCD CARE PLAN 30 MIN: CPT | Mod: 25

## 2024-08-29 PROCEDURE — 88108 CYTOPATH CONCENTRATE TECH: CPT

## 2024-08-29 PROCEDURE — 99221 1ST HOSP IP/OBS SF/LOW 40: CPT

## 2024-08-29 PROCEDURE — 49424 ASSESS CYST CONTRAST INJECT: CPT

## 2024-08-29 PROCEDURE — 87040 BLOOD CULTURE FOR BACTERIA: CPT

## 2024-08-29 PROCEDURE — 83930 ASSAY OF BLOOD OSMOLALITY: CPT

## 2024-08-29 PROCEDURE — 49083 ABD PARACENTESIS W/IMAGING: CPT

## 2024-08-29 PROCEDURE — 76080 X-RAY EXAM OF FISTULA: CPT

## 2024-08-29 PROCEDURE — 82140 ASSAY OF AMMONIA: CPT

## 2024-08-29 PROCEDURE — 87015 SPECIMEN INFECT AGNT CONCNTJ: CPT

## 2024-08-29 PROCEDURE — 85610 PROTHROMBIN TIME: CPT

## 2024-08-29 PROCEDURE — 89051 BODY FLUID CELL COUNT: CPT

## 2024-08-29 PROCEDURE — 87184 SC STD DISK METHOD PER PLATE: CPT

## 2024-08-29 PROCEDURE — 87070 CULTURE OTHR SPECIMN AEROBIC: CPT

## 2024-08-29 PROCEDURE — 84145 PROCALCITONIN (PCT): CPT

## 2024-08-29 PROCEDURE — 83880 ASSAY OF NATRIURETIC PEPTIDE: CPT

## 2024-08-29 PROCEDURE — 80053 COMPREHEN METABOLIC PANEL: CPT

## 2024-08-29 PROCEDURE — 84100 ASSAY OF PHOSPHORUS: CPT

## 2024-08-29 PROCEDURE — 82784 ASSAY IGA/IGD/IGG/IGM EACH: CPT

## 2024-08-29 PROCEDURE — 87086 URINE CULTURE/COLONY COUNT: CPT

## 2024-08-29 PROCEDURE — 83521 IG LIGHT CHAINS FREE EACH: CPT

## 2024-08-29 PROCEDURE — C1769: CPT

## 2024-08-29 PROCEDURE — 85027 COMPLETE CBC AUTOMATED: CPT

## 2024-08-29 PROCEDURE — 36415 COLL VENOUS BLD VENIPUNCTURE: CPT

## 2024-08-29 PROCEDURE — P9047: CPT

## 2024-08-29 PROCEDURE — 88305 TISSUE EXAM BY PATHOLOGIST: CPT

## 2024-08-29 PROCEDURE — 87075 CULTR BACTERIA EXCEPT BLOOD: CPT

## 2024-08-29 PROCEDURE — 83735 ASSAY OF MAGNESIUM: CPT

## 2024-08-29 PROCEDURE — 87102 FUNGUS ISOLATION CULTURE: CPT

## 2024-08-29 PROCEDURE — 80076 HEPATIC FUNCTION PANEL: CPT

## 2024-08-29 PROCEDURE — 83615 LACTATE (LD) (LDH) ENZYME: CPT

## 2024-08-29 PROCEDURE — 76705 ECHO EXAM OF ABDOMEN: CPT

## 2024-08-29 PROCEDURE — 85025 COMPLETE CBC W/AUTO DIFF WBC: CPT

## 2024-08-29 PROCEDURE — 80048 BASIC METABOLIC PNL TOTAL CA: CPT

## 2024-08-29 PROCEDURE — 84484 ASSAY OF TROPONIN QUANT: CPT

## 2024-08-29 PROCEDURE — 99223 1ST HOSP IP/OBS HIGH 75: CPT | Mod: 25

## 2024-08-29 PROCEDURE — 86140 C-REACTIVE PROTEIN: CPT

## 2024-08-29 RX ORDER — OXYCODONE HYDROCHLORIDE 5 MG/1
5 TABLET ORAL ONCE
Refills: 0 | Status: DISCONTINUED | OUTPATIENT
Start: 2024-08-29 | End: 2024-08-29

## 2024-08-29 RX ORDER — FOLIC ACID 1 MG
1 TABLET ORAL DAILY
Refills: 0 | Status: DISCONTINUED | OUTPATIENT
Start: 2024-08-29 | End: 2024-09-05

## 2024-08-29 RX ORDER — ONDANSETRON 2 MG/ML
4 INJECTION, SOLUTION INTRAMUSCULAR; INTRAVENOUS EVERY 8 HOURS
Refills: 0 | Status: DISCONTINUED | OUTPATIENT
Start: 2024-08-29 | End: 2024-09-05

## 2024-08-29 RX ORDER — PIPERACILLIN SODIUM AND TAZOBACTAM SODIUM 3; .375 G/15ML; G/15ML
3.38 INJECTION, POWDER, FOR SOLUTION INTRAVENOUS EVERY 8 HOURS
Refills: 0 | Status: DISCONTINUED | OUTPATIENT
Start: 2024-08-29 | End: 2024-09-04

## 2024-08-29 RX ORDER — BENZONATATE 100 MG
100 CAPSULE ORAL EVERY 8 HOURS
Refills: 0 | Status: DISCONTINUED | OUTPATIENT
Start: 2024-08-29 | End: 2024-09-05

## 2024-08-29 RX ORDER — ACETAMINOPHEN 325 MG/1
650 TABLET ORAL EVERY 6 HOURS
Refills: 0 | Status: DISCONTINUED | OUTPATIENT
Start: 2024-08-29 | End: 2024-09-05

## 2024-08-29 RX ORDER — HYDROMORPHONE HYDROCHLORIDE 2 MG/1
4 TABLET ORAL EVERY 4 HOURS
Refills: 0 | Status: DISCONTINUED | OUTPATIENT
Start: 2024-08-29 | End: 2024-08-30

## 2024-08-29 RX ORDER — ONDANSETRON 2 MG/ML
4 INJECTION, SOLUTION INTRAMUSCULAR; INTRAVENOUS EVERY 6 HOURS
Refills: 0 | Status: DISCONTINUED | OUTPATIENT
Start: 2024-08-29 | End: 2024-08-29

## 2024-08-29 RX ORDER — POLYETHYLENE GLYCOL 3350 17 G/17G
17 POWDER, FOR SOLUTION ORAL DAILY
Refills: 0 | Status: DISCONTINUED | OUTPATIENT
Start: 2024-08-29 | End: 2024-09-05

## 2024-08-29 RX ORDER — HYDROMORPHONE HYDROCHLORIDE 2 MG/1
4 TABLET ORAL ONCE
Refills: 0 | Status: DISCONTINUED | OUTPATIENT
Start: 2024-08-29 | End: 2024-08-29

## 2024-08-29 RX ORDER — MAGNESIUM OXIDE TAB 400 MG (240 MG ELEMENTAL MG) 400 (240 MG) MG
400 TAB ORAL
Refills: 0 | Status: DISCONTINUED | OUTPATIENT
Start: 2024-08-29 | End: 2024-09-05

## 2024-08-29 RX ORDER — URSODIOL 500 MG/1
300 TABLET, FILM COATED ORAL EVERY 12 HOURS
Refills: 0 | Status: DISCONTINUED | OUTPATIENT
Start: 2024-08-29 | End: 2024-09-04

## 2024-08-29 RX ORDER — MAGNESIUM, ALUMINUM HYDROXIDE 200-225/5
30 SUSPENSION, ORAL (FINAL DOSE FORM) ORAL EVERY 4 HOURS
Refills: 0 | Status: DISCONTINUED | OUTPATIENT
Start: 2024-08-29 | End: 2024-09-05

## 2024-08-29 RX ORDER — FOLIC ACID/MULTIVIT,IRON,MINER 0.4MG-18MG
2000 TABLET,CHEWABLE ORAL DAILY
Refills: 0 | Status: DISCONTINUED | OUTPATIENT
Start: 2024-08-29 | End: 2024-09-05

## 2024-08-29 RX ADMIN — PIPERACILLIN SODIUM AND TAZOBACTAM SODIUM 25 GRAM(S): 3; .375 INJECTION, POWDER, FOR SOLUTION INTRAVENOUS at 15:47

## 2024-08-29 RX ADMIN — Medication 1 MILLIGRAM(S): at 11:23

## 2024-08-29 RX ADMIN — Medication 2 MILLIGRAM(S): at 17:12

## 2024-08-29 RX ADMIN — Medication 2 MILLIGRAM(S): at 11:22

## 2024-08-29 RX ADMIN — URSODIOL 300 MILLIGRAM(S): 500 TABLET, FILM COATED ORAL at 21:39

## 2024-08-29 RX ADMIN — HYDROMORPHONE HYDROCHLORIDE 4 MILLIGRAM(S): 2 TABLET ORAL at 03:35

## 2024-08-29 RX ADMIN — URSODIOL 300 MILLIGRAM(S): 500 TABLET, FILM COATED ORAL at 11:23

## 2024-08-29 RX ADMIN — PIPERACILLIN SODIUM AND TAZOBACTAM SODIUM 25 GRAM(S): 3; .375 INJECTION, POWDER, FOR SOLUTION INTRAVENOUS at 21:40

## 2024-08-29 RX ADMIN — OXYCODONE HYDROCHLORIDE 5 MILLIGRAM(S): 5 TABLET ORAL at 00:52

## 2024-08-29 RX ADMIN — HYDROMORPHONE HYDROCHLORIDE 4 MILLIGRAM(S): 2 TABLET ORAL at 02:03

## 2024-08-29 RX ADMIN — Medication 2000 UNIT(S): at 11:23

## 2024-08-29 RX ADMIN — MAGNESIUM OXIDE TAB 400 MG (240 MG ELEMENTAL MG) 400 MILLIGRAM(S): 400 (240 MG) TAB at 17:13

## 2024-08-29 RX ADMIN — HYDROMORPHONE HYDROCHLORIDE 4 MILLIGRAM(S): 2 TABLET ORAL at 21:39

## 2024-08-29 NOTE — CONSULT NOTE ADULT - ASSESSMENT
Assessment:  49M with CA with liver and lung metastases on panitumumab, HTN, recurrent polymicrobial bacteremia due to recurrent acute cholangitis in setting of malignant biliary obstruction, s/p biliary drain placement presents 8/29 for decreased drainage from his catheters and low-grade fevers.   Most recently, patient was just discharged 8/17 for Polymicrobial Bacteremia, Kleb oxytoca, Pseudomonas putida due to Recurrent Ascending Cholangitis likely due to Malignant biliary obstruction, treated with IV antibiotic and eventually completed 2-weeks of PO antibiotic on 8/26/2024  Afebrile on admission, on RA  No leukocytosis  Elevated AST/ALT in 200s, Bili 2  CTAP with Mild interval increase in size of the patient&apos;s large mass in the rightmlobe of the liver. Additional lesions are similar to the prior examination.mExtensive retroperitoneal and periportal adenopathy, as previously., Peritoneal carcinomatosis., Metastases at the lung bases. Mild-to-moderate ascites.  RVP negative    Prior cultures:  BCx 8/1  Kleb oxytoca, Pseudomonas putida  BCx 8/4 NGTD  BCx 8/8 with Pseudomonas putida   BCx 8/12 NGTD    Antimicrobials:  piperacillin/tazobactam IVPB.. 3.375 every 8 hours (8/28 --- )    Impression:   #Fever  #Hx of Recurrent Ascending Cholangitis likely due to Malignant biliary obstruction  #Hx of Polymicrobial Bacteremia, Kleb oxytoca, Pseudomonas putida  #Metastatic Colon Cancer  - chemoport R chest wall without s/o infection, biliary drain exit site with some redness, but appears more irritation rather than soft tissue infection  - afebrile on admission, but concerning for recurrent ascending cholangitis and bacteremia    Recommendations:  - continue Zosyn 3.375 q8  - monitor temperature curve  - trend WBC  - side effects of antibiotic discussed, tolerating abx well so far  - follow up BCx x2  - Heme/onc on board, ?discussion for therapy in setting of malignancy progression  - plan for IR eval for concern of malfunction drain catheter  - Patient is high risk for recurrent bacteremia/cholangitis given malignancy    Clinical team may change from intravenous to oral antibiotics when the following criteria are met:   1. Patient is clinically improving/stable       a)	Improved signs and symptoms of infection from initial presentation       b)	Afebrile for 24 hours       c)	Leukocytosis trending towards normal range   2. Patient is tolerating oral intake   3. Initial/repeat blood cultures are negative OR do not need to wait for preliminary blood cultures to result    When above criteria met, may change iv antibiotics to: agent, dose, frequency, duration.  Cannot advise changing to oral antibiotic therapy until culture sensitivity is available.   Assessment:  49M with CA with liver and lung metastases on panitumumab, HTN, recurrent polymicrobial bacteremia due to recurrent acute cholangitis in setting of malignant biliary obstruction, s/p biliary drain placement presents 8/29 for decreased drainage from his catheters and low-grade fevers.   Most recently, patient was just discharged 8/17 for Polymicrobial Bacteremia, Kleb oxytoca, Pseudomonas putida due to Recurrent Ascending Cholangitis likely due to Malignant biliary obstruction, treated with IV antibiotic and eventually completed 2-weeks of PO antibiotic on 8/26/2024  Afebrile on admission, on RA  No leukocytosis  Elevated AST/ALT in 200s, Bili 2  CTAP with Mild interval increase in size of the patient&apos;s large mass in the rightmlobe of the liver. Additional lesions are similar to the prior examination.mExtensive retroperitoneal and periportal adenopathy, as previously., Peritoneal carcinomatosis., Metastases at the lung bases. Mild-to-moderate ascites.  RVP negative    Prior cultures:  BCx 8/1  Kleb oxytoca, Pseudomonas putida  BCx 8/4 NGTD  BCx 8/8 with Pseudomonas putida   BCx 8/12 NGTD    Antimicrobials:  piperacillin/tazobactam IVPB.. 3.375 every 8 hours (8/28 --- )    Impression:   #Fever   #Hx of Recurrent Ascending Cholangitis likely due to Malignant biliary obstruction  #Hx of Polymicrobial Bacteremia, Kleb oxytoca, Pseudomonas putida  #Metastatic Colon Cancer  - chemoport R chest wall without s/o infection, biliary drain exit site with some redness, but appears more irritation rather than soft tissue infection  - afebrile on admission, but had fever at home concerning for recurrent ascending cholangitis and bacteremia  - decrease drainage from R catheter, ?malfunction    Recommendations:  - continue Zosyn 3.375 q8  - monitor temperature curve  - trend WBC  - side effects of antibiotic discussed, tolerating abx well so far  - follow up BCx x2  - Heme/onc on board, ?discussion for therapy in setting of malignancy progression  - plan for IR eval for concern of malfunction drain catheter  - Patient is high risk for recurrent bacteremia/cholangitis given malignancy    Clinical team may change from intravenous to oral antibiotics when the following criteria are met:   1. Patient is clinically improving/stable       a)	Improved signs and symptoms of infection from initial presentation       b)	Afebrile for 24 hours       c)	Leukocytosis trending towards normal range   2. Patient is tolerating oral intake   3. Initial/repeat blood cultures are negative OR do not need to wait for preliminary blood cultures to result    When above criteria met, may change iv antibiotics to: agent, dose, frequency, duration.  Cannot advise changing to oral antibiotic therapy until culture sensitivity is available.   Assessment:  49M with CA with liver and lung metastases on panitumumab, HTN, recurrent polymicrobial bacteremia due to recurrent acute cholangitis in setting of malignant biliary obstruction, s/p biliary drain placement presents 8/29 for decreased drainage from his catheters and low-grade fevers.   Most recently, patient was just discharged 8/17 for Polymicrobial Bacteremia, Kleb oxytoca, Pseudomonas putida due to Recurrent Ascending Cholangitis likely due to Malignant biliary obstruction, treated with IV antibiotic and eventually completed 2-weeks of PO antibiotic on 8/26/2024  Afebrile on admission, on RA  No leukocytosis  Elevated AST/ALT in 200s, Bili 2  CTAP with Mild interval increase in size of the patient&apos;s large mass in the rightmlobe of the liver. Additional lesions are similar to the prior examination.mExtensive retroperitoneal and periportal adenopathy, as previously., Peritoneal carcinomatosis., Metastases at the lung bases. Mild-to-moderate ascites.  RVP negative    Prior cultures:  BCx 8/1  Kleb oxytoca, Pseudomonas putida  BCx 8/4 NGTD  BCx 8/8 with Pseudomonas putida   BCx 8/12 NGTD    Antimicrobials:  piperacillin/tazobactam IVPB.. 3.375 every 8 hours (8/28 --- )    Impression:   #Fever   #Hx of Recurrent Ascending Cholangitis likely due to Malignant biliary obstruction  #Hx of Polymicrobial Bacteremia, Kleb oxytoca, Pseudomonas putida  #Metastatic Colon Cancer  - chemoport R chest wall without s/o infection, biliary drain exit site with some redness, but appears more irritation rather than soft tissue infection  - afebrile on admission, but had fever at home concerning for recurrent ascending cholangitis and bacteremia  - decrease drainage from R catheter, ?malfunction    Recommendations:  - continue Zosyn 3.375 q8  - monitor temperature curve  - trend WBC  - side effects of antibiotic discussed, tolerating abx well so far  - follow up BCx x2  - Heme/onc on board, ?discussion for therapy in setting of malignancy progression  - plan for IR eval for concern of malfunction drain catheter  - Patient is high risk for recurrent bacteremia/cholangitis given malignancy    Clinical team may change from intravenous to oral antibiotics when the following criteria are met:   1. Patient is clinically improving/stable       a)	Improved signs and symptoms of infection from initial presentation       b)	Afebrile for 24 hours       c)	Leukocytosis trending towards normal range   2. Patient is tolerating oral intake   3. Initial/repeat blood cultures are negative OR do not need to wait for preliminary blood cultures to result    Cannot advise changing to oral antibiotic therapy until culture sensitivity is available.

## 2024-08-29 NOTE — H&P ADULT - NSHPPHYSICALEXAM_GEN_ALL_CORE
ICU Vital Signs Last 24 Hrs  T(C): 36.3 (29 Aug 2024 08:40), Max: 37.4 (28 Aug 2024 21:50)  T(F): 97.4 (29 Aug 2024 08:40), Max: 99.4 (28 Aug 2024 21:50)  HR: 98 (29 Aug 2024 08:40) (88 - 133)  BP: 117/74 (29 Aug 2024 08:40) (101/76 - 124/86)  BP(mean): 94 (29 Aug 2024 06:49) (82 - 98)  ABP: --  ABP(mean): --  RR: 16 (29 Aug 2024 08:40) (16 - 20)  SpO2: 98% (29 Aug 2024 08:40) (94% - 100%)    O2 Parameters below as of 29 Aug 2024 08:40  Patient On (Oxygen Delivery Method): room air    General: Awake and alert, cooperative with exam. No acute distress.   Skin: Warm, dry, and pink. Positive jaundice.   Eyes: Pupils equal and reactive to light. Extraocular eye movements intact. No conjunctival injection, discharge. Positive scleral icterus.   HEENT: Atraumatic, normocephalic. Moist mucus membranes.   Cardiology: Normal S1, S2. No murmurs, rubs, or gallops. Regular rate and rhythm.   Respiratory: Lungs clear to ascultation bilaterally. Good air exchange. No wheezes, rales, or rhonchi. Normal chest expansion.   Gastrointestinal: Positive bowel sounds. Soft, non-tender. No guarding, rigidity, or rebound tenderness. No hepatosplenomegaly. Positive for abdominal distension and fluid wave. Positive for reducible umbilical hernia. 2 biliary drains with bile draining.   Musculoskeletal: 5/5 motor strength in all extremities. Normal range of motion.   Extremities: No peripheral edema bilaterally. Dorsalis pedis pulses 2+ bilaterally.   Neurological: A+Ox3 (person, place, and time). Cranial nerves 2-12 intact. Normal speech. No facial droop. No focal neurological deficits.   Psychiatric: Normal affect. Normal mood.

## 2024-08-29 NOTE — CONSULT NOTE ADULT - PROBLEM SELECTOR RECOMMENDATION 9
- Reviewed with Dr. Wilson, pts fever and elevated bilirubin not likely to be d/t decreased output from biloma drain, as on imaging there is no residual fluid collection around the biloma drain, and no biliary ductal dilation around the biliary drain. Right biloma drain was clogged, and unclogged with flushing. However, pt stated that the output has been bloody for the past several weeks. This could potentially be due to the drains proximity to the liver mass. Recommend observing biloma drain output overnight. If output remains low and bloody, can consider removal tomorrow. Also recommended to primary team to workup other causes of fever/hyperbilirubinemia, although it may be 2/2 metastatic disease.

## 2024-08-29 NOTE — H&P ADULT - NSICDXFAMILYHX_GEN_ALL_CORE_FT
[Patient] : patient
FAMILY HISTORY:  Father  Still living? Unknown  FH: heart attack, Age at diagnosis: Age Unknown

## 2024-08-29 NOTE — H&P ADULT - NSICDXPASTMEDICALHX_GEN_ALL_CORE_FT
PAST MEDICAL HISTORY:  Colon cancer     H/O bacteremia     H/O sepsis     History of cholangitis     Hypertension     Metastasis to liver     Metastasis to lung     Obstructive jaundice

## 2024-08-29 NOTE — H&P ADULT - HISTORY OF PRESENT ILLNESS
48 y/o M with PMH metastatic colon CA with liver and lung metastases on panitumumab (last dose 7/10/24) who follows at [facility] [place], HTN, obstructive jaundice s/p biliary drain, sepsis, bacteremia, UTI, cholangitis, biloma s/p IR drain placement presents with fever. Pt states that he had a Tmax of 101.8F at home. He spoke to Dr. Michaels who advised him to come to the hosptial. He reports nausea and decreased drainage of right biliary drain (about 15 ml/day) when compared to left biliary drain (about 150-200 ml/day). Denies chills, chest pain, URI symptoms, cough, shortness of breath, chest pain, abdominal pain, vomiting, diarrhea, constipation, burning on urination. Please refer to recent admission below, pt was discharged on Levaquin and Flagyl which he completed yesterday. Pt was supposed to start chemotherapy and immunotherapy for his cancer 3 weeks ago but has been hospitalized.    Prior admission:   - 8/17/24: sepsis secondary to klebsiella oxytoca bacteremia, UTI with citrobacter, pseudomonas bacteremia, cholangitis d/t malignant biliary obstruction, biloma s/p IR drain placement, progression of metastatic disease -> discharged on Levaquin and Flagyl     ER course: HR . Labs: Hb 10.3 (baseline ~9), neutrophils 82.7%, INR 2.33, lactate 2.7 -> 1.8, glucose 124, albumin 2.0, total bilirubin 19.1, , . UA: unable to be performed due to color interference. RVP negative. EKG: Sinus tachycardia with  bpm, incomplete RBBB,  ms, no ST changes (personally reviewed).     Imaging:   - CT abd/pelvis: 1. Mild interval increase in size of the patient's large mass in the right  lobe of the liver. Additional lesions are similar to the prior examination. 2. Extensive retroperitoneal and periportal adenopathy, as previously. 3. Peritoneal carcinomatosis. 4. Metastases at the lung bases. 5. Mild-to-moderate ascites. 6. Cirrhosis.  - CXR: RLL infiltrate, small bilateral pleural effusions, no pneumothorax (personally reviewed).     Pt was given Meropenem, Zosyn, Vancomycin, 3100 ml of NS, Dilaudid, Oxycodone, IV tylenol. He was admitted to med/surg for further management.

## 2024-08-29 NOTE — PATIENT PROFILE ADULT - STATED REASON FOR ADMISSION
I went to [facility] [place] and said bili was high and was advised to come to the ED

## 2024-08-29 NOTE — CONSULT NOTE ADULT - ASSESSMENT
50yo M with biliary obstruction 2/2 metastatic colon CA to liver s/p IR left biliary drain, and biloma s/p IR biloma drain p/w fever, elevated bilirubin, low output from biloma drain  - Biloma drain was found to be clogged, cleared with flushing. However as before, output was noted to be bloody  - Left biliary drain flushed with no issues found  - No residual collection around biloma drain pigtail on CT yesterday  - No biliary ductal dilation noted on CT  - bilirubin 19.1  - INR 2.33

## 2024-08-29 NOTE — H&P ADULT - ASSESSMENT
50 y/o M presents with fever     1. Severe sepsis secondary to likely recurrent cholangitis in the setting of obstruction (r/o bacteremia, UTI)   - Admit to med/surg   - Tmax 101.8F reported at home, HR , lactate 2.7 -> 1.8   - s/p Meropenem, Zosyn, Vancomycin in ER   - BCx (8/8/24): Pseudomonas   - c/w Zosyn for now    - f/u UCx, BCx x 2, left and right biliary drains; adjust abx based on sensitivities   - Trend WBC, monitor for temperatures   - Tylenol for temperatures PRN   - s/p 3100 ml of NS, monitor off additional IVF   - Monitor BP closely   - ID consult - Dr. Mcnair     2. Right biliary drain with decreased drainage   - Drains exchanged 8/22 per IR   - SCDs for now if procedure needed   - Pt had breakfast already this morning -> will need to be NPO if intervention planned   - IR consult - Dr. Mascorro     3. Hyperbilirubinemia, transaminitis, obstructive jaundice secondary to increase in size of large mass in the right lobe of the liver and progression of metastatic cancer   - Trend LFTs and bilirubin   - Heme/Onc consult - Dr. Arguello     4. Mild to moderate ascites   - May need paracentesis, will f/u with IR and GI   - GI consult - Dr. Quintero     5. Hypoalbuminemia   - Albumin 2.0   - Nutrition consult    6. History of metastatic colon CA with liver and lung metastases on panitumumab (last dose 7/10/24) who follows at [facility], HTN, obstructive jaundice s/p biliary drain, sepsis, bacteremia, UTI, cholangitis, biloma s/p IR drain placement  - c/w home medications; verified with pt at the bedside   - Hb 10.3 (baseline ~9), INR 2.33, glucose 124, monitor closely     DVT ppx: SCDs for now -> advance to pharmacological if no planned procedures   Code status: Full code   Emergency contact: Pt will update his wife    I spent a total of 80 minutes on the date of this encounter coordinating the patient's care. This includes reviewing prior documentation, results and imaging in addition to completing a full history and physical examination on the patient. Further tests, medications, and procedures have been ordered as indicated. Laboratory results and the plan of care were communicated to the patient and/or their family member. Supporting documentation was completed and added to the patient's chart.

## 2024-08-29 NOTE — ED ADULT NURSE REASSESSMENT NOTE - NS ED NURSE REASSESS COMMENT FT1
Pt resting in stretcher with safety maintained. Respirations even and unlabored, no distress noted. Pt calm and cooperative.

## 2024-08-29 NOTE — CONSULT NOTE ADULT - ASSESSMENT
49-year-old male with a significant history of asthma and colon cancer, initially diagnosed in 2019. He underwent neoadjuvant chemotherapy followed by surgical resection and adjuvant treatment. In 2022, he was found to have progressive disease with metastasis to the liver and lung, confirmed via biomarkers.  The patient has undergone multiple lines of therapy and is currently on panitumumab under the care of Dr. Blood. He has had several hospitalizations and has experienced recurrent myelomas, which have been drained multiple times. Recently, he has been unable to complete systemic therapy due to complications.  The patient presented with decreased drainage from his catheters and low-grade fevers. His most recent blood cultures were positive for Klebsiella oxytoca, and he was recommended to continue a 2-week course of antibiotics. He reported high fevers, approximately 38.8°C (101.84°F), and decreased output from his drainage catheters.  Upon examination, the patient did not exhibit significant pain but had low-grade fever. Blood work revealed an AST of 230, ALP of 256, ALT of 32, and sodium of 130. Hemoglobin was 10.3, and hematocrit was 30.7. Imaging showed an increase in the size of the right lobe liver lesion compared to previous scans, with stable retroperitoneal and periportal lymph nodes.    Impression: :  The patient has a history of metastatic colon cancer with current complications including persistent bacteremia and drainage issues. He has been on panitumumab (Vectibix) but has not received other systemic therapies recently due to ongoing infections.  Recent imaging indicates an increase in the size of the right lobe liver lesion, while other metastatic sites remain stable. Blood cultures have identified Klebsiella oxytoca, necessitating a continued course of antibiotics. The patient's drainage catheters are not functioning optimally, contributing to his current symptoms.    Plan:  1. Continue the current antibiotic regimen for Klebsiella oxytoca as previously recommended.- in the interim would have ID reconsulted - now on ZOSYN   2. Consult Interventional Radiology (IR) to evaluate and potentially replace or remove the malfunctioning drainage catheters.  3. Coordinate with Dr. Blood to discuss the next steps in systemic therapy, considering the patient's persistent bacteremia and recent imaging findings.  4. Monitor the patient's liver function tests and electrolytes closely, addressing any abnormalities as needed.  5. REviewed recent CT abdomen suggestive of progression of RIGHT hepatic lobe lesion - patient had been scheduled to have follow up appt with Dr. Blood tomorrow.     Additional Notes:  Patient has a history of colon cancer with metastasis to liver and lung, currently experiencing complications related to drainage catheters and low-grade fever.

## 2024-08-29 NOTE — PATIENT PROFILE ADULT - ARE SIGNIFICANT INDICATORS COMPLETE.
Quality 226: Preventive Care And Screening: Tobacco Use: Screening And Cessation Intervention: Patient screened for tobacco use and is an ex/non-smoker
Quality 130: Documentation Of Current Medications In The Medical Record: Current Medications Documented
Quality 431: Preventive Care And Screening: Unhealthy Alcohol Use - Screening: Patient not identified as an unhealthy alcohol user when screened for unhealthy alcohol use using a systematic screening method
Detail Level: Detailed
Yes

## 2024-08-29 NOTE — H&P ADULT - NSHPREVIEWOFSYSTEMS_GEN_ALL_CORE
Constitutional: negative for fatigue, positive for fever, negative for chills, negative for decreased appetite.  Skin: negative for rashes, negative for open wounds, negative for jaundice.   Eyes: negative for blurry vision, negative for double vision.   Ears, nose, throat: negative for ear pain, negative for nasal congestion, negative for sore throat, negative for lymph node swelling.   Cardiovascular: negative for chest pain, negative for palpitations, negative for lower extremity swelling.   Respiratory: negative for shortness of breath, negative for wheezing, negative for cough.   Gastrointestinal: negative for abdominal pain, negative for nausea, negative for vomiting, negative for diarrhea, negative for constipation, negative for blood in the stool, negative for black tarry stools.   Genitourinary: negative for burning on urination, negative for urinary urgency or frequency, negative for blood in the urine.   Endocrine: negative for cold intolerance, negative for heat intolerance, negative for increased thirst.   Hematologic: negative for easy bruising or bleeding.   Musculoskeletal: negative for muscle/joint pain, negative for decreased range of motion.   Neurological: negative for dizziness, negative for headaches, negative for loss of consciousness, negative for motor weakness, negative for sensory deficits.   Psychiatric: negative for depression, negative for anxiety.

## 2024-08-29 NOTE — CONSULT NOTE ADULT - ASSESSMENT
Imp:  LLikely malignant ascites, which does note commonly become infected    Rec:  I don't think there's anything I can offer from a GI perspective as I don't think paracentesis would

## 2024-08-29 NOTE — ED ADULT NURSE REASSESSMENT NOTE - NS ED NURSE REASSESS COMMENT FT1
Pt resting in stretcher with safety maintained. Respirations even and unlabored, no distress noted. Pt denies pain/ discomfort at this time.

## 2024-08-30 LAB
-  BLOOD PCR PANEL: SIGNIFICANT CHANGE UP
ALBUMIN SERPL ELPH-MCNC: 1.6 G/DL — LOW (ref 3.3–5)
ALP SERPL-CCNC: 239 U/L — HIGH (ref 40–120)
ALT FLD-CCNC: 32 U/L — SIGNIFICANT CHANGE UP (ref 12–78)
ANION GAP SERPL CALC-SCNC: 6 MMOL/L — SIGNIFICANT CHANGE UP (ref 5–17)
AST SERPL-CCNC: 239 U/L — HIGH (ref 15–37)
BASOPHILS # BLD AUTO: 0.02 K/UL — SIGNIFICANT CHANGE UP (ref 0–0.2)
BASOPHILS NFR BLD AUTO: 0.3 % — SIGNIFICANT CHANGE UP (ref 0–2)
BILIRUB SERPL-MCNC: 15.3 MG/DL — HIGH (ref 0.2–1.2)
BUN SERPL-MCNC: 10 MG/DL — SIGNIFICANT CHANGE UP (ref 7–23)
CALCIUM SERPL-MCNC: 8.3 MG/DL — LOW (ref 8.5–10.1)
CHLORIDE SERPL-SCNC: 101 MMOL/L — SIGNIFICANT CHANGE UP (ref 96–108)
CO2 SERPL-SCNC: 26 MMOL/L — SIGNIFICANT CHANGE UP (ref 22–31)
CREAT SERPL-MCNC: 0.43 MG/DL — LOW (ref 0.5–1.3)
EGFR: 130 ML/MIN/1.73M2 — SIGNIFICANT CHANGE UP
EOSINOPHIL # BLD AUTO: 0.06 K/UL — SIGNIFICANT CHANGE UP (ref 0–0.5)
EOSINOPHIL NFR BLD AUTO: 0.9 % — SIGNIFICANT CHANGE UP (ref 0–6)
GLUCOSE SERPL-MCNC: 95 MG/DL — SIGNIFICANT CHANGE UP (ref 70–99)
GRAM STN FLD: ABNORMAL
GRAM STN FLD: SIGNIFICANT CHANGE UP
HCT VFR BLD CALC: 27.8 % — LOW (ref 39–50)
HGB BLD-MCNC: 9.3 G/DL — LOW (ref 13–17)
IMM GRANULOCYTES NFR BLD AUTO: 0.8 % — SIGNIFICANT CHANGE UP (ref 0–0.9)
LYMPHOCYTES # BLD AUTO: 0.72 K/UL — LOW (ref 1–3.3)
LYMPHOCYTES # BLD AUTO: 10.8 % — LOW (ref 13–44)
MCHC RBC-ENTMCNC: 32.5 PG — SIGNIFICANT CHANGE UP (ref 27–34)
MCHC RBC-ENTMCNC: 33.5 GM/DL — SIGNIFICANT CHANGE UP (ref 32–36)
MCV RBC AUTO: 97.2 FL — SIGNIFICANT CHANGE UP (ref 80–100)
METHOD TYPE: SIGNIFICANT CHANGE UP
MONOCYTES # BLD AUTO: 0.65 K/UL — SIGNIFICANT CHANGE UP (ref 0–0.9)
MONOCYTES NFR BLD AUTO: 9.8 % — SIGNIFICANT CHANGE UP (ref 2–14)
NEUTROPHILS # BLD AUTO: 5.16 K/UL — SIGNIFICANT CHANGE UP (ref 1.8–7.4)
NEUTROPHILS NFR BLD AUTO: 77.4 % — HIGH (ref 43–77)
PLATELET # BLD AUTO: 176 K/UL — SIGNIFICANT CHANGE UP (ref 150–400)
POTASSIUM SERPL-MCNC: 3.6 MMOL/L — SIGNIFICANT CHANGE UP (ref 3.5–5.3)
POTASSIUM SERPL-SCNC: 3.6 MMOL/L — SIGNIFICANT CHANGE UP (ref 3.5–5.3)
PROT SERPL-MCNC: 6 GM/DL — SIGNIFICANT CHANGE UP (ref 6–8.3)
RBC # BLD: 2.86 M/UL — LOW (ref 4.2–5.8)
RBC # FLD: 16.5 % — HIGH (ref 10.3–14.5)
SODIUM SERPL-SCNC: 133 MMOL/L — LOW (ref 135–145)
SPECIMEN SOURCE: SIGNIFICANT CHANGE UP
WBC # BLD: 6.66 K/UL — SIGNIFICANT CHANGE UP (ref 3.8–10.5)
WBC # FLD AUTO: 6.66 K/UL — SIGNIFICANT CHANGE UP (ref 3.8–10.5)

## 2024-08-30 PROCEDURE — 76080 X-RAY EXAM OF FISTULA: CPT | Mod: 26

## 2024-08-30 PROCEDURE — 49424 ASSESS CYST CONTRAST INJECT: CPT

## 2024-08-30 PROCEDURE — 99232 SBSQ HOSP IP/OBS MODERATE 35: CPT

## 2024-08-30 RX ORDER — OXYCODONE HYDROCHLORIDE 5 MG/1
10 TABLET ORAL EVERY 6 HOURS
Refills: 0 | Status: DISCONTINUED | OUTPATIENT
Start: 2024-08-30 | End: 2024-08-31

## 2024-08-30 RX ORDER — FUROSEMIDE 40 MG
20 TABLET ORAL DAILY
Refills: 0 | Status: DISCONTINUED | OUTPATIENT
Start: 2024-08-31 | End: 2024-09-01

## 2024-08-30 RX ADMIN — URSODIOL 300 MILLIGRAM(S): 500 TABLET, FILM COATED ORAL at 09:35

## 2024-08-30 RX ADMIN — PIPERACILLIN SODIUM AND TAZOBACTAM SODIUM 25 GRAM(S): 3; .375 INJECTION, POWDER, FOR SOLUTION INTRAVENOUS at 18:25

## 2024-08-30 RX ADMIN — URSODIOL 300 MILLIGRAM(S): 500 TABLET, FILM COATED ORAL at 20:29

## 2024-08-30 RX ADMIN — Medication 1 MILLIGRAM(S): at 09:34

## 2024-08-30 RX ADMIN — Medication 2 MILLIGRAM(S): at 23:13

## 2024-08-30 RX ADMIN — OXYCODONE HYDROCHLORIDE 10 MILLIGRAM(S): 5 TABLET ORAL at 20:29

## 2024-08-30 RX ADMIN — PIPERACILLIN SODIUM AND TAZOBACTAM SODIUM 25 GRAM(S): 3; .375 INJECTION, POWDER, FOR SOLUTION INTRAVENOUS at 05:29

## 2024-08-30 RX ADMIN — Medication 2 MILLIGRAM(S): at 23:28

## 2024-08-30 RX ADMIN — HYDROMORPHONE HYDROCHLORIDE 4 MILLIGRAM(S): 2 TABLET ORAL at 09:34

## 2024-08-30 RX ADMIN — OXYCODONE HYDROCHLORIDE 10 MILLIGRAM(S): 5 TABLET ORAL at 20:59

## 2024-08-30 RX ADMIN — MAGNESIUM OXIDE TAB 400 MG (240 MG ELEMENTAL MG) 400 MILLIGRAM(S): 400 (240 MG) TAB at 09:37

## 2024-08-30 RX ADMIN — MAGNESIUM OXIDE TAB 400 MG (240 MG ELEMENTAL MG) 400 MILLIGRAM(S): 400 (240 MG) TAB at 18:25

## 2024-08-30 NOTE — PROGRESS NOTE ADULT - SUBJECTIVE AND OBJECTIVE BOX
Subjective:  Chief complain :  Fever      HPI:  48 y/o M with PMH metastatic colon CA with liver and lung metastases on panitumumab (last dose 7/10/24) who [place] [facility] [place], HTN, obstructive jaundice s/p biliary drain, sepsis, bacteremia, UTI, cholangitis, biloma s/p IR drain placement presents with fever. Pt states that he had a Tmax of 101.8F at home. He spoke to Dr. Michaels who advised him to come to the hosptial. He reports nausea and decreased drainage of right biliary drain (about 15 ml/day) when compared to left biliary drain (about 150-200 ml/day). Denies chills, chest pain, URI symptoms, cough, shortness of breath, chest pain, abdominal pain, vomiting, diarrhea, constipation, burning on urination. Please refer to recent admission below, pt was discharged on Levaquin and Flagyl which he completed yesterday. Pt was supposed to start chemotherapy and immunotherapy for his cancer 3 weeks ago but has been hospitalized.    Prior admission:   - 8/17/24: sepsis secondary to klebsiella oxytoca bacteremia, UTI with citrobacter, pseudomonas bacteremia, cholangitis d/t malignant biliary obstruction, biloma s/p IR drain placement, progression of metastatic disease -> discharged on Levaquin and Flagyl     ER course: HR . Labs: Hb 10.3 (baseline ~9), neutrophils 82.7%, INR 2.33, lactate 2.7 -> 1.8, glucose 124, albumin 2.0, total bilirubin 19.1, , . UA: unable to be performed due to color interference. RVP negative. EKG: Sinus tachycardia with  bpm, incomplete RBBB,  ms, no ST changes (personally reviewed).     Imaging:   - CT abd/pelvis: 1. Mild interval increase in size of the patient's large mass in the right  lobe of the liver. Additional lesions are similar to the prior examination. 2. Extensive retroperitoneal and periportal adenopathy, as previously. 3. Peritoneal carcinomatosis. 4. Metastases at the lung bases. 5. Mild-to-moderate ascites. 6. Cirrhosis.  - CXR: RLL infiltrate, small bilateral pleural effusions, no pneumothorax (personally reviewed).     Pt was given Meropenem, Zosyn, Vancomycin, 3100 ml of NS, Dilaudid, Oxycodone, IV tylenol. He was admitted to med/surg for further management.        8/30  -  Patient seen and examined at bedside earlier today, afebrile, denies cp, dyspnea, abdominal pain only at night, tolerating some po intake, + bad taste, plan discussed    Review of system- Rest of the review of system are negative except mentioned in HPI     Vital sings reviewed for last 24 h  T(C): 36.8 (08-30-24 @ 17:00), Max: 36.8 (08-29-24 @ 23:56)  HR: 111 (08-30-24 @ 17:00) (104 - 119)  BP: 104/76 (08-30-24 @ 17:00) (104/76 - 118/93)  RR: 18 (08-30-24 @ 17:00) (18 - 19)  SpO2: 98% (08-30-24 @ 17:00) (95% - 99%)        Physical exam:   General : NAD, appear to be of stated age , well groomed   NERVOUS SYSTEM:  Alert & Oriented X3, non- focal exam, Motor Strength 5/5 B/L upper and lower extremities; DTRs 2+ intact and symmetric  HEAD:  Atraumatic, Normocephalic  EYES: EOMI, PERRLA, conjunctiva and sclera clear  HEENT: Moist mucous membranes, Supple neck , No JVD  CHEST: BS decreased at bases bilaterally; No rales, no rhonchi, no wheezing  HEART: Regular rate and rhythm; No murmurs, no rubs or gallops  ABDOMEN: Soft, Non-tender, + distended; + ascites ,  Bowel sounds present, no guarding , no peritoneal irritation   GENITOURINARY- Voiding, no suprapubic tenderness  EXTREMITIES:  2+ Peripheral Pulses, No clubbing, cyanosis,   edema  MUSCULOSKELETAL:- No muscle tenderness, Muscle tone normal, No joint tenderness, no Joint swelling,  Joint ROM –normal   SKIN-no rash, no lesion    Labs radiologic and other test : all reviewed and interpreted :                         9.3    6.66  )-----------( 176      ( 30 Aug 2024 07:39 )             27.8     08-30    133<L>  |  101  |  10  ----------------------------<  95  3.6   |  26  |  0.43<L>    Ca    8.3<L>      30 Aug 2024 07:39    TPro  6.0  /  Alb  1.6<L>  /  TBili  15.3<H>  /  DBili  x   /  AST  239<H>  /  ALT  32  /  AlkPhos  239<H>  08-30    PT/INR - ( 28 Aug 2024 21:48 )   PT: 25.7 sec;   INR: 2.33 ratio         PTT - ( 28 Aug 2024 21:48 )  PTT:34.8 sec        < from: CT Abdomen and Pelvis w/ IV Cont (08.28.24 @ 23:51) >  IMPRESSION:  1.   Metastatic disease with interval tendency to increase in size of the   hepatic metastases, some pulmonary metastases and abdominal   lymphadenopathy.    2.  Mild biliary duct dilatation in the right hepatic lobe despite   central CBD stent extending to the right biliary ductal system, no   pneumobilia. Stent patency is to be questioned. Stable left biliary   ductal percutaneous stent.    3. Right subhepatic percutaneous catheter without residual localized   collection but surrounding ascites. Correlate with drain output.    4. Peritoneal carcinomatosis      < end of copied text >      RECENT CULTURES:      Cardiac testing : reviewed   EKG < from: 12 Lead ECG (08.28.24 @ 21:31) >  Ventricular Rate 128 BPM    Atrial Rate 128 BPM    P-R Interval 122 ms    QRS Duration 110 ms    Q-T Interval 330 ms    QTC Calculation(Bazett) 481 ms    P Axis 51 degrees    R Axis 28 degrees    T Axis 42 degrees    Diagnosis Line Sinus tachycardia  Incomplete right bundle branch block  ST & T wave abnormality, consider anterior ischemia  Abnormal ECG    < end of copied text >      Procedures :     Devices:     Current medications:  acetaminophen     Tablet .. 650 milliGRAM(s) Oral every 6 hours PRN  aluminum hydroxide/magnesium hydroxide/simethicone Suspension 30 milliLiter(s) Oral every 4 hours PRN  benzonatate 100 milliGRAM(s) Oral every 8 hours PRN  bisacodyl 5 milliGRAM(s) Oral daily PRN  cholecalciferol 2000 Unit(s) Oral daily  folic acid 1 milliGRAM(s) Oral daily  magnesium oxide 400 milliGRAM(s) Oral two times a day with meals  melatonin 3 milliGRAM(s) Oral at bedtime PRN  morphine  - Injectable 2 milliGRAM(s) IV Push every 4 hours PRN  morphine  IR 15 milliGRAM(s) Oral every 6 hours PRN  ondansetron Injectable 4 milliGRAM(s) IV Push every 8 hours PRN  oxyCODONE    IR 10 milliGRAM(s) Oral every 6 hours PRN  piperacillin/tazobactam IVPB.. 3.375 Gram(s) IV Intermittent every 8 hours  polyethylene glycol 3350 17 Gram(s) Oral daily PRN  rifAXIMin 550 milliGRAM(s) Oral two times a day  ursodiol Capsule 300 milliGRAM(s) Oral every 12 hours

## 2024-08-30 NOTE — PROGRESS NOTE ADULT - SUBJECTIVE AND OBJECTIVE BOX
Date of Service:08-30-24 @ 12:55  Interval History/ROS: Afebrile overnight. Patient assessed at bedside this morning. Denied any acute complaints.      REVIEW OF SYSTEMS  [  ] ROS unobtainable because:    [ x ] All other systems negative except as noted below    Constitutional:  [ ] fever [ ] chills  [ ] weight loss  [ ]night sweat  [ ]poor appetite/PO intake [ ]fatigue   Skin:  [ ] rash [ ] phlebitis	  Eyes: [ ] icterus [ ] pain  [ ] discharge	  ENMT: [ ] sore throat  [ ] thrush [ ] ulcers [ ] exudates [ ]anosmia  Respiratory: [ ] dyspnea [ ] hemoptysis [ ] cough [ ] sputum	  Cardiovascular:  [ ] chest pain [ ] palpitations [ ] edema	  Gastrointestinal:  [ ] nausea [ ] vomiting [ ] diarrhea [ ] constipation [ ] pain	  Genitourinary:  [ ] dysuria [ ] frequency [ ] hematuria [ ] discharge [ ] flank pain  [ ] incontinence  Musculoskeletal:  [ ] myalgias [ ] arthralgias [ ] arthritis  [ ] back pain  Neurological:  [ ] headache [ ] weakness [ ] seizures  [ ] confusion/altered mental status    Allergies  No Known Allergies        ANTIMICROBIALS:    piperacillin/tazobactam IVPB.. 3.375 every 8 hours  rifAXIMin 550 two times a day        OTHER MEDS: MEDICATIONS  (STANDING):  acetaminophen     Tablet .. 650 every 6 hours PRN  aluminum hydroxide/magnesium hydroxide/simethicone Suspension 30 every 4 hours PRN  benzonatate 100 every 8 hours PRN  bisacodyl 5 daily PRN  HYDROmorphone   Tablet 4 every 4 hours PRN  melatonin 3 at bedtime PRN  morphine  - Injectable 2 every 4 hours PRN  ondansetron Injectable 4 every 8 hours PRN  polyethylene glycol 3350 17 daily PRN  ursodiol Capsule 300 every 12 hours      Vital Signs Last 24 Hrs  T(F): 98.2 (08-30-24 @ 09:11), Max: 99.4 (08-28-24 @ 21:50)    Vital Signs Last 24 Hrs  HR: 119 (08-30-24 @ 09:11) (99 - 119)  BP: 111/77 (08-30-24 @ 09:11) (107/68 - 112/71)  RR: 18 (08-30-24 @ 09:11)  SpO2: 97% (08-30-24 @ 09:11) (96% - 98%)  Wt(kg): --    EXAM:    Constitutional:  well preserved, comfortable  Head/Eyes: +icterus  LUNGS:  CTA  CVS:  regular rhythm  Abd:  soft, non-tender; +distended +drainage  Ext:  no edema  Vascular:  IV site no erythema tenderness or discharge  Neuro: AAO X 3, non- focal    Labs:                        9.3    6.66  )-----------( 176      ( 30 Aug 2024 07:39 )             27.8     08-30    133<L>  |  101  |  10  ----------------------------<  95  3.6   |  26  |  0.43<L>    Ca    8.3<L>      30 Aug 2024 07:39    TPro  6.0  /  Alb  1.6<L>  /  TBili  15.3<H>  /  DBili  x   /  AST  239<H>  /  ALT  32  /  AlkPhos  239<H>  08-30      WBC Trend:  WBC Count: 6.66 (08-30-24 @ 07:39)  WBC Count: 10.20 (08-28-24 @ 21:48)      Creatine Trend:  Creatinine: 0.43 (08-30)  Creatinine: 0.69 (08-28)      Liver Biochemical Testing Trend:  Alanine Aminotransferase (ALT/SGPT): 32 (08-30)  Alanine Aminotransferase (ALT/SGPT): 32 (08-28)  Alanine Aminotransferase (ALT/SGPT): 44 (08-17)  Alanine Aminotransferase (ALT/SGPT): 40 (08-16)  Alanine Aminotransferase (ALT/SGPT): 38 (08-15)  Aspartate Aminotransferase (AST/SGOT): 239 (08-30-24 @ 07:39)  Aspartate Aminotransferase (AST/SGOT): 230 (08-28-24 @ 21:48)  Aspartate Aminotransferase (AST/SGOT): 219 (08-17-24 @ 06:09)  Aspartate Aminotransferase (AST/SGOT): 225 (08-16-24 @ 05:31)  Aspartate Aminotransferase (AST/SGOT): 239 (08-15-24 @ 05:39)  Bilirubin Total: 15.3 (08-30)  Bilirubin Total: 19.1 (08-28)  Bilirubin Direct: 13.4 (08-17)  Bilirubin Total: 18.2 (08-17)  Bilirubin Direct: 12.0 (08-16)      Trend LDH      Urinalysis Basic - ( 30 Aug 2024 07:39 )    Color: x / Appearance: x / SG: x / pH: x  Gluc: 95 mg/dL / Ketone: x  / Bili: x / Urobili: x   Blood: x / Protein: x / Nitrite: x   Leuk Esterase: x / RBC: x / WBC x   Sq Epi: x / Non Sq Epi: x / Bacteria: x        MICROBIOLOGY:        Urinalysis with Rflx Culture (collected 29 Aug 2024 00:47)    Culture - Blood (collected 28 Aug 2024 21:48)  Source: .Blood None  Preliminary Report:    Growth in aerobic bottle: Gram Negative Rods    Direct identification is available within approximately 3-5    hours either by Blood Panel Multiplexed PCR or Direct    MALDI-TOF. Details: [email or web address]  Organism: Blood Culture PCR  Organism: Blood Culture PCR    Sensitivities:      Method Type: PCR      -  Blood PCR Panel: NEG    Culture - Blood (collected 28 Aug 2024 21:48)  Source: .Blood None  Preliminary Report:    Growth in aerobic bottle: Gram Negative Rods    Culture - Fungal, Body Fluid (collected 15 Aug 2024 15:07)  Source: Peritoneal Peritoneal Fluid  Preliminary Report:    No fungus isolated at 1 week.    Culture - Body Fluid with Gram Stain (collected 15 Aug 2024 15:07)  Source: Ascites Fl Ascites Fluid  Final Report:    No growth at 5 days    Culture - Blood (collected 12 Aug 2024 09:25)  Source: .Blood None  Final Report:    No growth at 5 days    Culture - Blood (collected 12 Aug 2024 09:24)  Source: .Blood None  Final Report:    No growth at 5 days    Culture - Blood (collected 08 Aug 2024 20:09)  Source: .Blood None  Final Report:    Growth in aerobic bottle: Pseudomonas putida group    Direct identification is available within approximately 3-5    hours either by Blood Panel Multiplexed PCR or Direct    MALDI-TOF. Details: [email or web address]  Organism: Blood Culture PCR  Pseudomonas putida group  Organism: Pseudomonas putida group    Sensitivities:      -  Levofloxacin: S <=0.5      -  Tobramycin: S <=2      -  Aztreonam: R >16      -  Gentamicin: S <=2      -  Cefepime: S 4      -  Piperacillin/Tazobactam: S <=8      -  Ciprofloxacin: S 1      -  Ceftriaxone: I 32      Method Type: AIDAN      -  Meropenem: S 2      -  Amikacin: S <=16      -  Trimethoprim/Sulfamethoxazole: R >2/38  Organism: Blood Culture PCR    Sensitivities:      Method Type: PCR      -  Blood PCR Panel: NEG    Culture - Blood (collected 08 Aug 2024 20:09)  Source: .Blood None  Final Report:    Growth in aerobic bottle: Pseudomonas putida group    See previous culture [id number]    Culture - Body Fluid with Gram Stain (collected 05 Aug 2024 15:59)  Source: .Body Fluid SUB HEPATIC FLUID COLLECTION  Final Report:    No growth at 5 days      Lactate, Blood: 1.8 (08-29 @ 01:14)  Lactate, Blood: 2.7 (08-28 @ 21:48)        RADIOLOGY:  imaging below personally reviewed      CT Abdomen and Pelvis w/ IV Cont:   ACC: [id number] EXAM:  CT ABDOMEN AND PELVIS IC   ORDERED BY: HAYDE VÁSQUEZ     PROCEDURE DATE:  08/28/2024          INTERPRETATION:  PROCEDURE INFORMATION:  Exam: CT Abdomen And Pelvis With Contrast  Exam date and time: 8/28/2024 11:49 PM  Age: 49 years old  Clinical indication: History of metastatic colon cancer with bilomas with   biliary drains presents for    TECHNIQUE:  Imaging protocol: Computed tomography of the abdomen and pelvis with   contrast.  Contrast material: IV: OMNIPAQUE 350; Contrast volume: 90 ml; Contrast   route: IV;    COMPARISON:  CT ABDOMEN AND PELVIS WITH IV CONTRAST 8/9/2024 8:22 AM    FINDINGS:  Tubes, catheters and devices:  Left upper abdomen percutaneous biliary pigtail catheter coiling in the   biliary system ofthe left hepatic lobe in unchanged position compared to   8/9/2024.  Right subhepatic percutaneous drainage catheter with the pigtail next to   the gallbladder bed. No surrounding significant residual fluid collection.  Partially visualized central venous catheter in the right atrium.    Lungs: Bilateral numerous metastases at the lung bases, some stable, some   mildly increased in size, for example in the left lower lobe a pulmonary   nodule measures 2.2 cm, previously 1.5 cm. Lower lobe atelectatic changes.  Pleural spaces: Moderate right pleural effusion. Small left pleural   effusion.  Lower mediastinum: There is a large infracarinal neoplastic mass   measuring 5.1 x 6.3 cm which is partially visualized.    Liver: Large mass in the right lobe of the liver measures approximately   10.2 x 7.4 cm, image 2:41, previously 8.6 x 7.1 cm. Additional   hypoattenuating liver lesions with tendency to increase in size, for   example segment 7, 3.0 cm, image 2:32, previously 2.4 cm.    Gallbladderand biliary ducts: Biliary stent extending from the CBD into   the right biliary system. Mild biliary dilatation in the right hepatic   lobe. No pneumobilia. Mildly dilated bile ducts distal to the mass in the   right lobe. Left percutaneous approachbiliary drain is unchanged. No   left hepatic biliary dilatation.    Pancreas: Within normal limits. No ductal dilation.  Spleen: Normal. No splenomegaly.  Adrenal glands: Within normal limits.  Kidneys and ureters: Within normal limits. Symmetric enhancement. No   hydronephrosis.  Stomach and bowel: Unremarkable. No obstruction. No mucosal thickening.   Colocolonic anastomosis is patent. Appendix: No evidence of appendicitis.    Peritoneum: There is moderate volume ascites in the pelvis, perisplenic   and perihepatic. Diffuse nodularity in the peritoneum (2:66) and omentum   including the anterior abdominal wall fat-containing hernia;  findings   are compatible with peritoneal carcinomatosis.    Vasculature: No abdominal aortic aneurysm. Portal vein, left and right   hepatic veins, IVC, SMV and splenic vein are patent. Middle hepatic vein   is obscured by tumor.  Lymph nodes: Extensive adenopathy in the retroperitoneum, retrocrural and   periportal/perihepatic  demonstrates slight tendency to increase in size   since prior study 8/9/2024, for example portacaval adenopathy 3.5 x 4.8   cm from  3.5 x 4.4 cm, image 2:55, and retrocaval, 3.5 x 3.8 cm, image   2:69, previously 3.3 x 3.4 cm.  Urinary bladder: Unremarkable as visualized.  Reproductive: Prostate within normal limits.  Soft tissues: Fat and nodular metastases are seen in a periumbilical   hernia .  Bones/joints: No acute osseous abnormality.    IMPRESSION:  1.   Metastatic disease with interval tendency to increase in size of the   hepatic metastases, some pulmonary metastases and abdominal   lymphadenopathy.    2.  Mild biliary duct dilatation in the right hepatic lobe despite   central CBD stent extending to the right biliary ductal system, no   pneumobilia. Stent patency is to be questioned. Stable left biliary   ductal percutaneous stent.    3. Right subhepatic percutaneous catheter without residual localized   collection but surrounding ascites. Correlate with drain output.    4. Peritoneal carcinomatosis    --- End of Report ---      ALBERTO COSTA MD; Attending Radiologist  This document has been electronically signed. Aug 29 2024  7:03PM (08-28-24 @ 23:51)

## 2024-08-30 NOTE — PROGRESS NOTE ADULT - ASSESSMENT
49-year-old male with a significant history of asthma and colon cancer, initially diagnosed in 2019. He underwent neoadjuvant chemotherapy followed by surgical resection and adjuvant treatment. In 2022, he was found to have progressive disease with metastasis to the liver and lung, confirmed via biomarkers.    # metastatic colon ca  The patient has undergone multiple lines of therapy and is currently on panitumumab under the care of Dr. Blood.   He has had several hospitalizations and has experienced recurrent biliomas, which have been drained multiple times.   Recently, he has been unable to complete systemic therapy due to complications.  CT a/p with increased in hepatic mets and pulm mets w/ abd LAD, mild biliary duct dilatation in right hepatic lobe despite central cbd stent and peritoneal carcinomatosis   bili 15.3 today from 19   IR consulted for evaluation of drain   Coordinate with Dr. Blood to discuss the next steps in systemic therapy, considering the patient's persistent bacteremia and recent imaging findings. Had televisit today that was cancelled due to hospitalization but will have [facility] reach out     # GNR bacteremia   The patient presented with decreased drainage from his catheters and low-grade fevers. His most recent blood cultures were positive for Klebsiella oxytoca, and he was recommended to continue a 2-week course of antibiotics.   He reported high fevers, approximately 38.8°C (101.84°F), and decreased output from his drainage catheters.  IR consulted  Continue the current antibiotic regimen for Klebsiella oxytoca as previously recommended.- in the interim would have ID reconsulted - now on ZOSYN     will follow daily

## 2024-08-30 NOTE — PROGRESS NOTE ADULT - ASSESSMENT
48 y/o M presents with fever     # Severe sepsis secondary to likely recurrent cholangitis in the setting of obstruction (r/o bacteremia, UTI)   - Tmax 101.8F reported at home, HR , lactate 2.7 -> 1.8   - s/p Meropenem, Zosyn, Vancomycin in ER   - BCx (8/8/24): Pseudomonas   - c/w Zosyn for now    - f/u UCx, BCx x 2, left and right biliary drains; adjust abx based on sensitivities   - Trend WBC, monitor for temperatures   - Tylenol for temperatures PRN   - s/p 3100 ml of NS, monitor off additional IVF   - Monitor BP closely   - ID consult - Dr. Mcnair   - IR consult - right bilary drain removed    # Right biliary drain with decreased drainage   - Drains exchanged 8/22 per IR   - SCDs for now if procedure needed   - Pt had breakfast already this morning -> will need to be NPO if intervention planned   - IR consult - Dr. Mascorro   - right drain removed 8/30/24     3. Hyperbilirubinemia, transaminitis, obstructive jaundice secondary to increase in size of large mass in the right lobe of the liver and progression of metastatic cancer   - Trend LFTs and bilirubin   - Heme/Onc consult - Dr. Arguello   - Bili 19--> 15    4. Mild to moderate ascites   - May need paracentesis, will f/u with IR and GI   - GI consult - Dr. Quintero   - start lasix 20     5. Hypoalbuminemia   - Albumin 2.0   - Nutrition consult  - add supplements    6. History of metastatic colon CA with liver and lung metastases on panitumumab (last dose 7/10/24) who follows at [facility], HTN, obstructive jaundice s/p biliary drain, sepsis, bacteremia, UTI, cholangitis, biloma s/p IR drain placement  - c/w home medications; verified with pt at the bedside   - Hb 10.3 (baseline ~9), INR 2.33, glucose 124, monitor closely     DVT ppx: SCDs for now -> advance to pharmacological if no planned procedures   Code status: Full code   Emergency contact: Pt will update his wife

## 2024-08-30 NOTE — PROGRESS NOTE ADULT - SUBJECTIVE AND OBJECTIVE BOX
INTERVAL HPI/OVERNIGHT EVENTS:  Patient S&E at bedside.   pt sitting in chair at bedside   he denies any pain  bcx positive- discussed with ID   was planned for televisit with [facility] today but cancelled due to hospitalization   bili 15.3 today     PAST MEDICAL & SURGICAL HISTORY:  Colon cancer      Hypertension      Metastasis to liver      Metastasis to lung      Obstructive jaundice      H/O sepsis      H/O bacteremia      History of cholangitis      History of biliary stent insertion      S/P hernia surgery      History of ablation of neoplasm of liver      S/P colon resection          FAMILY HISTORY:  FH: heart attack (Father)        VITAL SIGNS:  T(F): 98.2 (08-30-24 @ 09:11)  HR: 119 (08-30-24 @ 09:11)  BP: 111/77 (08-30-24 @ 09:11)  RR: 18 (08-30-24 @ 09:11)  SpO2: 97% (08-30-24 @ 09:11)  Wt(kg): --    PHYSICAL EXAM:    Constitutional: NAD  Eyes: EOMI, sclera icteric  Respiratory: CTA b/l,   Cardiovascular: RRR,   Gastrointestinal: soft, drain in place  Neurological: AAOx3      MEDICATIONS  (STANDING):  cholecalciferol 2000 Unit(s) Oral daily  folic acid 1 milliGRAM(s) Oral daily  magnesium oxide 400 milliGRAM(s) Oral two times a day with meals  piperacillin/tazobactam IVPB.. 3.375 Gram(s) IV Intermittent every 8 hours  rifAXIMin 550 milliGRAM(s) Oral two times a day  ursodiol Capsule 300 milliGRAM(s) Oral every 12 hours    MEDICATIONS  (PRN):  acetaminophen     Tablet .. 650 milliGRAM(s) Oral every 6 hours PRN Temp greater or equal to 38C (100.4F), Mild Pain (1 - 3)  aluminum hydroxide/magnesium hydroxide/simethicone Suspension 30 milliLiter(s) Oral every 4 hours PRN Dyspepsia  benzonatate 100 milliGRAM(s) Oral every 8 hours PRN for cough  bisacodyl 5 milliGRAM(s) Oral daily PRN Constipation  HYDROmorphone   Tablet 4 milliGRAM(s) Oral every 4 hours PRN for severe pain  melatonin 3 milliGRAM(s) Oral at bedtime PRN Insomnia  morphine  - Injectable 2 milliGRAM(s) IV Push every 4 hours PRN Severe Pain (7 - 10)  ondansetron Injectable 4 milliGRAM(s) IV Push every 8 hours PRN Nausea and/or Vomiting  polyethylene glycol 3350 17 Gram(s) Oral daily PRN for constipation      Allergies    No Known Allergies    Intolerances        LABS:                        9.3    6.66  )-----------( 176      ( 30 Aug 2024 07:39 )             27.8     08-30    133<L>  |  101  |  10  ----------------------------<  95  3.6   |  26  |  0.43<L>    Ca    8.3<L>      30 Aug 2024 07:39    TPro  6.0  /  Alb  1.6<L>  /  TBili  15.3<H>  /  DBili  x   /  AST  239<H>  /  ALT  32  /  AlkPhos  239<H>  08-30    PT/INR - ( 28 Aug 2024 21:48 )   PT: 25.7 sec;   INR: 2.33 ratio         PTT - ( 28 Aug 2024 21:48 )  PTT:34.8 sec  Urinalysis Basic - ( 30 Aug 2024 07:39 )    Color: x / Appearance: x / SG: x / pH: x  Gluc: 95 mg/dL / Ketone: x  / Bili: x / Urobili: x   Blood: x / Protein: x / Nitrite: x   Leuk Esterase: x / RBC: x / WBC x   Sq Epi: x / Non Sq Epi: x / Bacteria: x        RADIOLOGY & ADDITIONAL TESTS:  Studies reviewed.

## 2024-08-30 NOTE — PROGRESS NOTE ADULT - ASSESSMENT
Assessment:  49M with CA with liver and lung metastases on panitumumab, HTN, recurrent polymicrobial bacteremia due to recurrent acute cholangitis in setting of malignant biliary obstruction, s/p biliary drain placement presents 8/29 for decreased drainage from his catheters and low-grade fevers.   Most recently, patient was just discharged 8/17 for Polymicrobial Bacteremia, Kleb oxytoca, Pseudomonas putida due to Recurrent Ascending Cholangitis likely due to Malignant biliary obstruction, treated with IV antibiotic and eventually completed 2-weeks of PO antibiotic on 8/26/2024  Afebrile on admission, on RA  No leukocytosis  Elevated AST/ALT in 200s, Bili 2  CTAP with Mild interval increase in size of the patient&apos;s large mass in the rightmlobe of the liver. Additional lesions are similar to the prior examination.mExtensive retroperitoneal and periportal adenopathy, as previously., Peritoneal carcinomatosis., Metastases at the lung bases. Mild-to-moderate ascites.  RVP negative  BCx 8/28 GNR  CT with mild biliary duct dilatation in the right hepatic lobe despite central CBD stent extending to the right biliary ductal system, no pneumobilia. Stent patency is to be questioned.     Prior cultures:  BCx 8/1  Kleb oxytoca, Pseudomonas putida  BCx 8/4 NGTD  BCx 8/8 with Pseudomonas putida   BCx 8/12 NGTD    Antimicrobials:  piperacillin/tazobactam IVPB.. 3.375 every 8 hours (8/28 --- )    Impression:   #Recurrent GNR Bacteremia   #Fever   #Hx of Recurrent Ascending Cholangitis likely due to Malignant biliary obstruction  #Hx of Polymicrobial Bacteremia, Kleb oxytoca, Pseudomonas putida  #Metastatic Colon Cancer  - chemoport R chest wall without s/o infection, biliary drain exit site with some redness, but appears more irritation rather than soft tissue infection  - again presents with recurrent GNR bacteremia, likely in setting of malignant biliary obstruction versus drain malfunction  - remains afebrile, nontoxic appearing, no acute complaints   - CT showing concern of central CBD stent patency    Recommendations:  - continue Zosyn 3.375 q8  - monitor temperature curve  - trend WBC  - side effects of antibiotic discussed, tolerating abx well so far  - follow up repeat BCx x2 (8/30)  - Heme/onc following  - GI eval noted  - IR eval for concern of malfunction drain catheter  - Patient is high risk for recurrent bacteremia/cholangitis given malignancy  - Unfortunately, patient has history of recurrent Pseudomonas species bacteremia, with only Fluoroquinolone as PO option. I would strongly advice not to do Fluoroquinolone suppressive therapy as it has high-risk of side effects, not limited to QTC prolongation, Tendinopathy, Aortic dissection, C diff infection, Seizures     Clinical team may change from intravenous to oral antibiotics when the following criteria are met:   1. Patient is clinically improving/stable       a)	Improved signs and symptoms of infection from initial presentation       b)	Afebrile for 24 hours       c)	Leukocytosis trending towards normal range   2. Patient is tolerating oral intake   3. Initial/repeat blood cultures are negative OR do not need to wait for preliminary blood cultures to result    Cannot advise changing to oral antibiotic therapy until culture sensitivity is available.

## 2024-08-30 NOTE — CONSULT NOTE ADULT - CONVERSATION DETAILS
Met with Patient at bedside, pt remembered prior palliative care discussions from recent visit. We discussed his overall condition and progressive underlying metastatic colon cancer complicated by recurrent cholangitis and biliary obstruction.     We again discussed Palliative Care team being a supportive team when a patient has ongoing illnesses.  We also discussed that it is not an end of life care service, but can help navigate symptoms and emotional support throughout their hospital stay here.    Patient feels as though he is currently doing well. He was scheduled to have an [place] [facility] [place], however since pt is admitted, the appointment is canceled. Patient reports that he is doing well, he believes he is going to be admitted for a few days to receive IV abx and to remove the right drain.     We discussed at length patients underlying clinical diagnosis at length.  We discussed resuscitation and risk and benefits of CPR. Risks include, broken ribs, lung puncture, pain and discomfort. We also discussed mechanical ventilation in  an event that they could no longer breath on their own.  Risk and benefits of mechanical ventilation were discussed at length.  At this time, patient wishes to remain FULL CODE.     All additional questions and concerns were addressed. Palliative team will continue to follow.

## 2024-08-30 NOTE — CONSULT NOTE ADULT - ASSESSMENT
48 y/o M with PMH metastatic colon CA with liver and lung metastases on panitumumab (last dose 7/10/24) who follows at [facility], HTN, obstructive jaundice s/p biliary drain, sepsis, bacteremia, UTI, cholangitis, biloma s/p IR drain placement presents with fever. Pt states that he had a Tmax of 101.8F at home.  He reports nausea and decreased drainage of right biliary drain (about 15 ml/day) when compared to left biliary drain (about 150-200 ml/day). Denies chills, chest pain, URI symptoms, cough, shortness of breath, chest pain, abdominal pain, vomiting, diarrhea, constipation, burning on urination. Please refer to recent admission below, pt was discharged on Levaquin and Flagyl which he completed yesterday. Pt was supposed to start chemotherapy and immunotherapy for his cancer 3 weeks ago but has been hospitalized. CT imaging in the ER reveal mild interval increase in size of the patient's large mass in the right lobe of the liver. Pt started on IV abx and restarted home pain regimen of Dilaudid 4mg PO q4hr PRN as well as MSIR 2mg IV q4hr PRN severe pain while hospitalized. Palliative medicine is consulted for further assistance with complex GOC and pain and symptoms management in setting of progressive metastatic disease.     Process of Care  --Reviewed dx/treatment problems and alignment with Goals of Care    Physical Aspects of Care  --Pain  patient denies at this time  c/w current managment  - dilaudid 4mg PO q4hr PRN  - morphine 2mg IV q4hr PRN     --Bowel Regimen  denies constipation  risk for constipation d/t immobility  daily dulcolax    --Dyspnea  No SOB at this time  comfortable and in NAD    --Nausea Vomiting  denies    --Weakness  PT as tolerated     Psychological and Psychiatric Aspects of Care:   --Greif/Bereavment: emotional support provided  --Hx of psychiatric dx: none  -Pastoral Care Available PRN     Social Aspects of Care  -SW involved     Cultural Aspects  -Primary Language: English    Goals of Care:     We discussed Palliative Care team being a supportive team when a patient has ongoing illnesses.  We also discussed that it is not an end of life care service, but can help navigate symptoms and emotional support throughout their hospital stay here.      Prognosis: Death can occur at anytime, but if disease continues to progress naturally patient likely has days to weeks.    Ethical and Legal Aspects:   NA        Capacity: pt has capacity for medical decision making   HCP/Surrogate:     Code Status: FULL CODE  MOLST: n/a  Dispo Plan: continue current abx, return home     Discussed With: Case coordinated with attending and SW and RN     Time Spent: 45 minutes including the care, coordination and counseling of this patient, 50% of which was spent coordinating and counseling.

## 2024-08-31 LAB
-  AMIKACIN: SIGNIFICANT CHANGE UP
-  AZTREONAM: SIGNIFICANT CHANGE UP
-  CEFEPIME: SIGNIFICANT CHANGE UP
-  CEFTRIAXONE: SIGNIFICANT CHANGE UP
-  CIPROFLOXACIN: SIGNIFICANT CHANGE UP
-  GENTAMICIN: SIGNIFICANT CHANGE UP
-  LEVOFLOXACIN: SIGNIFICANT CHANGE UP
-  MEROPENEM: SIGNIFICANT CHANGE UP
-  PIPERACILLIN/TAZOBACTAM: SIGNIFICANT CHANGE UP
-  TOBRAMYCIN: SIGNIFICANT CHANGE UP
-  TRIMETHOPRIM/SULFAMETHOXAZOLE: SIGNIFICANT CHANGE UP
ALBUMIN SERPL ELPH-MCNC: 1.6 G/DL — LOW (ref 3.3–5)
ALP SERPL-CCNC: 267 U/L — HIGH (ref 40–120)
ALT FLD-CCNC: 29 U/L — SIGNIFICANT CHANGE UP (ref 12–78)
AMMONIA BLD-MCNC: 48 UMOL/L — HIGH (ref 11–32)
ANION GAP SERPL CALC-SCNC: 6 MMOL/L — SIGNIFICANT CHANGE UP (ref 5–17)
AST SERPL-CCNC: 242 U/L — HIGH (ref 15–37)
BASOPHILS # BLD AUTO: 0.03 K/UL — SIGNIFICANT CHANGE UP (ref 0–0.2)
BASOPHILS NFR BLD AUTO: 0.5 % — SIGNIFICANT CHANGE UP (ref 0–2)
BILIRUB DIRECT SERPL-MCNC: 11.2 MG/DL — HIGH (ref 0–0.3)
BILIRUB INDIRECT FLD-MCNC: 2.7 MG/DL — HIGH (ref 0.2–1)
BILIRUB SERPL-MCNC: 13.9 MG/DL — HIGH (ref 0.2–1.2)
BUN SERPL-MCNC: 9 MG/DL — SIGNIFICANT CHANGE UP (ref 7–23)
CALCIUM SERPL-MCNC: 8.3 MG/DL — LOW (ref 8.5–10.1)
CHLORIDE SERPL-SCNC: 100 MMOL/L — SIGNIFICANT CHANGE UP (ref 96–108)
CO2 SERPL-SCNC: 25 MMOL/L — SIGNIFICANT CHANGE UP (ref 22–31)
CREAT SERPL-MCNC: 0.36 MG/DL — LOW (ref 0.5–1.3)
CRP SERPL-MCNC: 70 MG/L — HIGH
CULTURE RESULTS: ABNORMAL
CULTURE RESULTS: ABNORMAL
EGFR: 137 ML/MIN/1.73M2 — SIGNIFICANT CHANGE UP
EOSINOPHIL # BLD AUTO: 0.05 K/UL — SIGNIFICANT CHANGE UP (ref 0–0.5)
EOSINOPHIL NFR BLD AUTO: 0.8 % — SIGNIFICANT CHANGE UP (ref 0–6)
GLUCOSE SERPL-MCNC: 102 MG/DL — HIGH (ref 70–99)
HCT VFR BLD CALC: 26.5 % — LOW (ref 39–50)
HGB BLD-MCNC: 8.8 G/DL — LOW (ref 13–17)
IMM GRANULOCYTES NFR BLD AUTO: 0.6 % — SIGNIFICANT CHANGE UP (ref 0–0.9)
INR BLD: 2.23 RATIO — HIGH (ref 0.85–1.18)
LYMPHOCYTES # BLD AUTO: 0.87 K/UL — LOW (ref 1–3.3)
LYMPHOCYTES # BLD AUTO: 13.4 % — SIGNIFICANT CHANGE UP (ref 13–44)
MAGNESIUM SERPL-MCNC: 1.8 MG/DL — SIGNIFICANT CHANGE UP (ref 1.6–2.6)
MCHC RBC-ENTMCNC: 32.2 PG — SIGNIFICANT CHANGE UP (ref 27–34)
MCHC RBC-ENTMCNC: 33.2 GM/DL — SIGNIFICANT CHANGE UP (ref 32–36)
MCV RBC AUTO: 97.1 FL — SIGNIFICANT CHANGE UP (ref 80–100)
MELD SCORE WITH DIALYSIS: 39 POINTS — SIGNIFICANT CHANGE UP
MELD SCORE WITHOUT DIALYSIS: 28 POINTS — SIGNIFICANT CHANGE UP
METHOD TYPE: SIGNIFICANT CHANGE UP
MONOCYTES # BLD AUTO: 0.6 K/UL — SIGNIFICANT CHANGE UP (ref 0–0.9)
MONOCYTES NFR BLD AUTO: 9.3 % — SIGNIFICANT CHANGE UP (ref 2–14)
NEUTROPHILS # BLD AUTO: 4.89 K/UL — SIGNIFICANT CHANGE UP (ref 1.8–7.4)
NEUTROPHILS NFR BLD AUTO: 75.4 % — SIGNIFICANT CHANGE UP (ref 43–77)
NT-PROBNP SERPL-SCNC: 219 PG/ML — HIGH (ref 0–125)
ORGANISM # SPEC MICROSCOPIC CNT: ABNORMAL
ORGANISM # SPEC MICROSCOPIC CNT: ABNORMAL
ORGANISM # SPEC MICROSCOPIC CNT: SIGNIFICANT CHANGE UP
PHOSPHATE SERPL-MCNC: 2.4 MG/DL — LOW (ref 2.5–4.5)
PLATELET # BLD AUTO: 169 K/UL — SIGNIFICANT CHANGE UP (ref 150–400)
POTASSIUM SERPL-MCNC: 3.6 MMOL/L — SIGNIFICANT CHANGE UP (ref 3.5–5.3)
POTASSIUM SERPL-SCNC: 3.6 MMOL/L — SIGNIFICANT CHANGE UP (ref 3.5–5.3)
PROCALCITONIN SERPL-MCNC: 0.52 NG/ML — HIGH (ref 0.02–0.1)
PROT SERPL-MCNC: 6 GM/DL — SIGNIFICANT CHANGE UP (ref 6–8.3)
PROTHROM AB SERPL-ACNC: 24.6 SEC — HIGH (ref 9.5–13)
RBC # BLD: 2.73 M/UL — LOW (ref 4.2–5.8)
RBC # FLD: 16.6 % — HIGH (ref 10.3–14.5)
SODIUM SERPL-SCNC: 131 MMOL/L — LOW (ref 135–145)
SPECIMEN SOURCE: SIGNIFICANT CHANGE UP
SPECIMEN SOURCE: SIGNIFICANT CHANGE UP
TROPONIN I, HIGH SENSITIVITY RESULT: <3 NG/L — SIGNIFICANT CHANGE UP
WBC # BLD: 6.48 K/UL — SIGNIFICANT CHANGE UP (ref 3.8–10.5)
WBC # FLD AUTO: 6.48 K/UL — SIGNIFICANT CHANGE UP (ref 3.8–10.5)

## 2024-08-31 PROCEDURE — 99232 SBSQ HOSP IP/OBS MODERATE 35: CPT

## 2024-08-31 RX ORDER — SODIUM PHOSPHATE, MONOBASIC, MONOHYDRATE AND SODIUM PHOSPHATE, DIBASIC ANHYDROUS 276; 142 MG/ML; MG/ML
15 INJECTION, SOLUTION INTRAVENOUS ONCE
Refills: 0 | Status: COMPLETED | OUTPATIENT
Start: 2024-08-31 | End: 2024-08-31

## 2024-08-31 RX ORDER — PHYTONADIONE (VIT K1) 1 MG/0.5ML
2.5 AMPUL (ML) INJECTION ONCE
Refills: 0 | Status: COMPLETED | OUTPATIENT
Start: 2024-08-31 | End: 2024-08-31

## 2024-08-31 RX ORDER — ZOLPIDEM TARTRATE 5 MG/1
5 TABLET, FILM COATED ORAL AT BEDTIME
Refills: 0 | Status: DISCONTINUED | OUTPATIENT
Start: 2024-08-31 | End: 2024-09-05

## 2024-08-31 RX ORDER — TRAMADOL HYDROCHLORIDE 200 MG/1
50 TABLET, EXTENDED RELEASE ORAL EVERY 6 HOURS
Refills: 0 | Status: DISCONTINUED | OUTPATIENT
Start: 2024-08-31 | End: 2024-09-01

## 2024-08-31 RX ADMIN — Medication 20 MILLIGRAM(S): at 09:27

## 2024-08-31 RX ADMIN — SODIUM PHOSPHATE, MONOBASIC, MONOHYDRATE AND SODIUM PHOSPHATE, DIBASIC ANHYDROUS 62.5 MILLIMOLE(S): 276; 142 INJECTION, SOLUTION INTRAVENOUS at 11:28

## 2024-08-31 RX ADMIN — Medication 1 MILLIGRAM(S): at 09:27

## 2024-08-31 RX ADMIN — PIPERACILLIN SODIUM AND TAZOBACTAM SODIUM 25 GRAM(S): 3; .375 INJECTION, POWDER, FOR SOLUTION INTRAVENOUS at 21:44

## 2024-08-31 RX ADMIN — URSODIOL 300 MILLIGRAM(S): 500 TABLET, FILM COATED ORAL at 21:43

## 2024-08-31 RX ADMIN — TRAMADOL HYDROCHLORIDE 50 MILLIGRAM(S): 200 TABLET, EXTENDED RELEASE ORAL at 10:16

## 2024-08-31 RX ADMIN — Medication 2.5 MILLIGRAM(S): at 11:28

## 2024-08-31 RX ADMIN — MAGNESIUM OXIDE TAB 400 MG (240 MG ELEMENTAL MG) 400 MILLIGRAM(S): 400 (240 MG) TAB at 17:25

## 2024-08-31 RX ADMIN — PIPERACILLIN SODIUM AND TAZOBACTAM SODIUM 25 GRAM(S): 3; .375 INJECTION, POWDER, FOR SOLUTION INTRAVENOUS at 05:31

## 2024-08-31 RX ADMIN — Medication 2 MILLIGRAM(S): at 18:17

## 2024-08-31 RX ADMIN — PIPERACILLIN SODIUM AND TAZOBACTAM SODIUM 25 GRAM(S): 3; .375 INJECTION, POWDER, FOR SOLUTION INTRAVENOUS at 14:44

## 2024-08-31 RX ADMIN — TRAMADOL HYDROCHLORIDE 50 MILLIGRAM(S): 200 TABLET, EXTENDED RELEASE ORAL at 11:00

## 2024-08-31 RX ADMIN — Medication 2000 UNIT(S): at 09:28

## 2024-08-31 RX ADMIN — Medication 15 MILLIGRAM(S): at 14:44

## 2024-08-31 RX ADMIN — Medication 2 MILLIGRAM(S): at 18:47

## 2024-08-31 RX ADMIN — URSODIOL 300 MILLIGRAM(S): 500 TABLET, FILM COATED ORAL at 09:27

## 2024-08-31 RX ADMIN — Medication 100 GRAM(S): at 10:08

## 2024-08-31 RX ADMIN — Medication 15 MILLIGRAM(S): at 21:52

## 2024-08-31 RX ADMIN — MAGNESIUM OXIDE TAB 400 MG (240 MG ELEMENTAL MG) 400 MILLIGRAM(S): 400 (240 MG) TAB at 09:28

## 2024-08-31 NOTE — PROGRESS NOTE ADULT - SUBJECTIVE AND OBJECTIVE BOX
INTERVAL HPI/OVERNIGHT EVENTS:  Patient S&E at bedside.   pt sitting in chair at bedside   he denies any pain  bcx positive- discussed with ID   was planned for televisit with [facility] today but cancelled due to hospitalization   bili 15.3 today     PAST MEDICAL & SURGICAL HISTORY:  Colon cancer      Hypertension      Metastasis to liver      Metastasis to lung      Obstructive jaundice      H/O sepsis      H/O bacteremia      History of cholangitis      History of biliary stent insertion      S/P hernia surgery      History of ablation of neoplasm of liver      S/P colon resection          FAMILY HISTORY:  FH: heart attack (Father)        Vital Signs Last 24 Hrs  T(C): 36.7 (08-31-24 @ 08:50), Max: 37.2 (08-30-24 @ 22:03)  T(F): 98 (08-31-24 @ 08:50), Max: 99 (08-30-24 @ 22:03)  HR: 113 (08-31-24 @ 08:50) (102 - 113)  BP: 113/79 (08-31-24 @ 08:50) (104/76 - 118/93)  BP(mean): --  RR: 18 (08-31-24 @ 08:50) (18 - 19)  SpO2: 100% (08-31-24 @ 08:50) (95% - 100%)      PHYSICAL EXAM:    Constitutional: NAD  Eyes: EOMI, sclera icteric  Respiratory: CTA b/l,   Cardiovascular: RRR,   Gastrointestinal: soft, drain in place  Neurological: AAOx3    MEDICATIONS  (STANDING):  cholecalciferol 2000 Unit(s) Oral daily  folic acid 1 milliGRAM(s) Oral daily  furosemide    Tablet 20 milliGRAM(s) Oral daily  magnesium oxide 400 milliGRAM(s) Oral two times a day with meals  phytonadione   Solution 2.5 milliGRAM(s) Oral once  piperacillin/tazobactam IVPB.. 3.375 Gram(s) IV Intermittent every 8 hours  rifAXIMin 550 milliGRAM(s) Oral two times a day  sodium phosphate 15 milliMole(s)/250 mL IVPB 15 milliMole(s) IV Intermittent once  ursodiol Capsule 300 milliGRAM(s) Oral every 12 hours    MEDICATIONS  (PRN):  acetaminophen     Tablet .. 650 milliGRAM(s) Oral every 6 hours PRN Temp greater or equal to 38C (100.4F), Mild Pain (1 - 3)  aluminum hydroxide/magnesium hydroxide/simethicone Suspension 30 milliLiter(s) Oral every 4 hours PRN Dyspepsia  benzonatate 100 milliGRAM(s) Oral every 8 hours PRN for cough  bisacodyl 5 milliGRAM(s) Oral daily PRN Constipation  melatonin 3 milliGRAM(s) Oral at bedtime PRN Insomnia  morphine  - Injectable 2 milliGRAM(s) IV Push every 4 hours PRN Severe Pain (7 - 10)  morphine  IR 15 milliGRAM(s) Oral every 6 hours PRN Severe Pain (7 - 10)  ondansetron Injectable 4 milliGRAM(s) IV Push every 8 hours PRN Nausea and/or Vomiting  polyethylene glycol 3350 17 Gram(s) Oral daily PRN for constipation  traMADol 50 milliGRAM(s) Oral every 6 hours PRN Moderate Pain (4 - 6)        Allergies    No Known Allergies    Intolerances        LABS:                           8.8    6.48  )-----------( 169      ( 31 Aug 2024 06:34 )             26.5                        9.3    6.66  )-----------( 176      ( 30 Aug 2024 07:39 )             27.8     08-30    133<L>  |  101  |  10  ----------------------------<  95  3.6   |  26  |  0.43<L>    Ca    8.3<L>      30 Aug 2024 07:39    TPro  6.0  /  Alb  1.6<L>  /  TBili  15.3<H>  /  DBili  x   /  AST  239<H>  /  ALT  32  /  AlkPhos  239<H>  08-30    PT/INR - ( 28 Aug 2024 21:48 )   PT: 25.7 sec;   INR: 2.33 ratio         PTT - ( 28 Aug 2024 21:48 )  PTT:34.8 sec  Urinalysis Basic - ( 30 Aug 2024 07:39 )    Color: x / Appearance: x / SG: x / pH: x  Gluc: 95 mg/dL / Ketone: x  / Bili: x / Urobili: x   Blood: x / Protein: x / Nitrite: x   Leuk Esterase: x / RBC: x / WBC x   Sq Epi: x / Non Sq Epi: x / Bacteria: x        RADIOLOGY & ADDITIONAL TESTS:  Studies reviewed.   INTERVAL HPI/OVERNIGHT EVENTS:  Patient S&E at bedside.   he denies any pain  pt's wife spoke to Dr Blood yesterday.  No plans for additional chemotherapy   may consider palliative care   bili 13.9 today   jaundiced +++    PAST MEDICAL & SURGICAL HISTORY:  Colon cancer      Hypertension      Metastasis to liver      Metastasis to lung      Obstructive jaundice      H/O sepsis      H/O bacteremia      History of cholangitis      History of biliary stent insertion      S/P hernia surgery      History of ablation of neoplasm of liver      S/P colon resection          FAMILY HISTORY:  FH: heart attack (Father)        Vital Signs Last 24 Hrs  T(C): 36.7 (08-31-24 @ 08:50), Max: 37.2 (08-30-24 @ 22:03)  T(F): 98 (08-31-24 @ 08:50), Max: 99 (08-30-24 @ 22:03)  HR: 113 (08-31-24 @ 08:50) (102 - 113)  BP: 113/79 (08-31-24 @ 08:50) (104/76 - 118/93)  BP(mean): --  RR: 18 (08-31-24 @ 08:50) (18 - 19)  SpO2: 100% (08-31-24 @ 08:50) (95% - 100%)      PHYSICAL EXAM:    Constitutional: NAD  Eyes: EOMI, sclera icteric  Respiratory: CTA b/l,   Cardiovascular: RRR,   Gastrointestinal: soft, drain in place  Neurological: AAOx3    MEDICATIONS  (STANDING):  cholecalciferol 2000 Unit(s) Oral daily  folic acid 1 milliGRAM(s) Oral daily  furosemide    Tablet 20 milliGRAM(s) Oral daily  magnesium oxide 400 milliGRAM(s) Oral two times a day with meals  phytonadione   Solution 2.5 milliGRAM(s) Oral once  piperacillin/tazobactam IVPB.. 3.375 Gram(s) IV Intermittent every 8 hours  rifAXIMin 550 milliGRAM(s) Oral two times a day  sodium phosphate 15 milliMole(s)/250 mL IVPB 15 milliMole(s) IV Intermittent once  ursodiol Capsule 300 milliGRAM(s) Oral every 12 hours    MEDICATIONS  (PRN):  acetaminophen     Tablet .. 650 milliGRAM(s) Oral every 6 hours PRN Temp greater or equal to 38C (100.4F), Mild Pain (1 - 3)  aluminum hydroxide/magnesium hydroxide/simethicone Suspension 30 milliLiter(s) Oral every 4 hours PRN Dyspepsia  benzonatate 100 milliGRAM(s) Oral every 8 hours PRN for cough  bisacodyl 5 milliGRAM(s) Oral daily PRN Constipation  melatonin 3 milliGRAM(s) Oral at bedtime PRN Insomnia  morphine  - Injectable 2 milliGRAM(s) IV Push every 4 hours PRN Severe Pain (7 - 10)  morphine  IR 15 milliGRAM(s) Oral every 6 hours PRN Severe Pain (7 - 10)  ondansetron Injectable 4 milliGRAM(s) IV Push every 8 hours PRN Nausea and/or Vomiting  polyethylene glycol 3350 17 Gram(s) Oral daily PRN for constipation  traMADol 50 milliGRAM(s) Oral every 6 hours PRN Moderate Pain (4 - 6)        Allergies    No Known Allergies    Intolerances        LABS:                           8.8    6.48  )-----------( 169      ( 31 Aug 2024 06:34 )             26.5                        9.3    6.66  )-----------( 176      ( 30 Aug 2024 07:39 )             27.8     08-30    133<L>  |  101  |  10  ----------------------------<  95  3.6   |  26  |  0.43<L>    Ca    8.3<L>      30 Aug 2024 07:39    TPro  6.0  /  Alb  1.6<L>  /  TBili  15.3<H>  /  DBili  x   /  AST  239<H>  /  ALT  32  /  AlkPhos  239<H>  08-30    PT/INR - ( 28 Aug 2024 21:48 )   PT: 25.7 sec;   INR: 2.33 ratio         PTT - ( 28 Aug 2024 21:48 )  PTT:34.8 sec  Urinalysis Basic - ( 30 Aug 2024 07:39 )    Color: x / Appearance: x / SG: x / pH: x  Gluc: 95 mg/dL / Ketone: x  / Bili: x / Urobili: x   Blood: x / Protein: x / Nitrite: x   Leuk Esterase: x / RBC: x / WBC x   Sq Epi: x / Non Sq Epi: x / Bacteria: x        RADIOLOGY & ADDITIONAL TESTS:  Studies reviewed.

## 2024-08-31 NOTE — PROGRESS NOTE ADULT - ASSESSMENT
50 y/o M presents with fever     # Severe sepsis secondary to likely recurrent cholangitis in the setting of obstruction (r/o bacteremia, UTI)   - Tmax 101.8F reported at home, HR , lactate 2.7 -> 1.8   - s/p Meropenem, Zosyn, Vancomycin in ER   - BCx (8/8/24): Pseudomonas   - c/w Zosyn for now    - f/u UCx, BCx x 2, left and right biliary drains; adjust abx based on sensitivities   - Trend WBC, monitor for temperatures   - Tylenol for temperatures PRN   - s/p 3100 ml of NS, monitor off additional IVF   - Monitor BP closely   - ID consult - Dr. Mcnair   - IR consult - right bilary drain removed  - biliary fluid 1 of 2 cultures positive for stenotrophomonas maltophilia - will d/w ID team  - repeat blood cultures 8/30 - neg    # Right biliary drain with decreased drainage   - Drains exchanged 8/22 per IR   - SCDs for now if procedure needed   - Pt had breakfast already this morning -> will need to be NPO if intervention planned   - IR consult - Dr. Mascorro   - right drain removed 8/30/24     3. Hyperbilirubinemia, transaminitis, obstructive jaundice secondary to increase in size of large mass in the right lobe of the liver and progression of metastatic cancer   - Trend LFTs and bilirubin   - Heme/Onc consult - Dr. Arguello   - Bili 19--> 15--> 13    4. Mild to moderate ascites , Hypervolumic hyponatremia  - May need paracentesis, will f/u with IR and GI   - GI consult - Dr. Quintero   - start lasix 20     5. Hypoalbuminemia   - Albumin 2.0   - Nutrition consult  - add supplements    6. History of metastatic colon CA with liver and lung metastases on panitumumab (last dose 7/10/24) who follows at [facility], HTN, obstructive jaundice s/p biliary drain, sepsis, bacteremia, UTI, cholangitis, biloma s/p IR drain placement  - c/w home medications; verified with pt at the bedside   - Hb 10.3 (baseline ~9), INR 2.33, glucose 124, monitor closely     7. Coagulopathy  - INR > 2  - 8/31 - vit K 2.5 mg x1 dose    8. Abdominal pain  - stop dialudid - hepatic excretion   - trial of tramadol and morphine PO prn with IV morphine prn    9. Insomnia  - ambien    DVT ppx: SCDs for now -> advance to pharmacological if no planned procedures   Code status: Full code   Emergency contact: Pt will update his wife

## 2024-08-31 NOTE — PROGRESS NOTE ADULT - ASSESSMENT
49-year-old male with a significant history of asthma and colon cancer, initially diagnosed in 2019. He underwent neoadjuvant chemotherapy followed by surgical resection and adjuvant treatment. In 2022, he was found to have progressive disease with metastasis to the liver and lung, confirmed via biomarkers.    # metastatic colon ca  The patient has undergone multiple lines of therapy and is currently on panitumumab under the care of Dr. Blood.   He has had several hospitalizations and has experienced recurrent biliomas, which have been drained multiple times.   Recently, he has been unable to complete systemic therapy due to complications.  CT a/p with increased in hepatic mets and pulm mets w/ abd LAD, mild biliary duct dilatation in right hepatic lobe despite central cbd stent and peritoneal carcinomatosis   bili 15.3 today from 19   IR consulted for evaluation of drain   Coordinate with Dr. Blood to discuss the next steps in systemic therapy, considering the patient's persistent bacteremia and recent imaging findings. Had televisit today that was cancelled due to hospitalization but will have [facility] reach out     # GNR bacteremia   The patient presented with decreased drainage from his catheters and low-grade fevers. His most recent blood cultures were positive for Klebsiella oxytoca, and he was recommended to continue a 2-week course of antibiotics.   He reported high fevers, approximately 38.8°C (101.84°F), and decreased output from his drainage catheters.  IR consulted  Continue the current antibiotic regimen for Klebsiella oxytoca as previously recommended.- in the interim would have ID reconsulted - now on ZOSYN     will follow daily      49-year-old male with a significant history of asthma and colon cancer, initially diagnosed in 2019. He underwent neoadjuvant chemotherapy followed by surgical resection and adjuvant treatment. In 2022, he was found to have progressive disease with metastasis to the liver and lung, confirmed via biomarkers.    # metastatic colon ca  The patient has undergone multiple lines of therapy and is currently on panitumumab under the care of Dr. Blood.   He has had several hospitalizations and has experienced recurrent biliomas, which have been drained multiple times.   Recently, he has been unable to complete systemic therapy due to complications.  CT a/p with increased in hepatic mets and pulm mets w/ abd LAD, mild biliary duct dilatation in right hepatic lobe despite central cbd stent and peritoneal carcinomatosis   bili 13.9 today from 19   IR consulted for evaluation of drain - drain removed 8/30  pt's wife d/w Dr Blood last night.  no further plans for anticancer therapy from Onc perspective   palliative care following.  GOC conversations ongoing  hospice appropriate.  currently full code.      # GNR bacteremia   The patient presented with decreased drainage from his catheters and low-grade fevers. His most recent blood cultures were positive for Klebsiella oxytoca, and he was recommended to continue a 2-week course of antibiotics.   He reported high fevers, approximately 38.8°C (101.84°F), and decreased output from his drainage catheters.  biloma drain removed 8/30  Continue the current antibiotic regimen for Klebsiella oxytoca as previously recommended.- ID following - now on ZOSYN   BCx 8/30 pending    will follow daily     will update GERALDINE Nicole MD  [facility]  [phone]

## 2024-08-31 NOTE — PROGRESS NOTE ADULT - SUBJECTIVE AND OBJECTIVE BOX
Subjective:  Chief complain :  Fever      HPI:  50 y/o M with PMH metastatic colon CA with liver and lung metastases on panitumumab (last dose 7/10/24) who [place] [facility] [place], HTN, obstructive jaundice s/p biliary drain, sepsis, bacteremia, UTI, cholangitis, biloma s/p IR drain placement presents with fever. Pt states that he had a Tmax of 101.8F at home. He spoke to Dr. Michaels who advised him to come to the hosptial. He reports nausea and decreased drainage of right biliary drain (about 15 ml/day) when compared to left biliary drain (about 150-200 ml/day). Denies chills, chest pain, URI symptoms, cough, shortness of breath, chest pain, abdominal pain, vomiting, diarrhea, constipation, burning on urination. Please refer to recent admission below, pt was discharged on Levaquin and Flagyl which he completed yesterday. Pt was supposed to start chemotherapy and immunotherapy for his cancer 3 weeks ago but has been hospitalized.    Prior admission:   - 8/17/24: sepsis secondary to klebsiella oxytoca bacteremia, UTI with citrobacter, pseudomonas bacteremia, cholangitis d/t malignant biliary obstruction, biloma s/p IR drain placement, progression of metastatic disease -> discharged on Levaquin and Flagyl     ER course: HR . Labs: Hb 10.3 (baseline ~9), neutrophils 82.7%, INR 2.33, lactate 2.7 -> 1.8, glucose 124, albumin 2.0, total bilirubin 19.1, , . UA: unable to be performed due to color interference. RVP negative. EKG: Sinus tachycardia with  bpm, incomplete RBBB,  ms, no ST changes (personally reviewed).     Imaging:   - CT abd/pelvis: 1. Mild interval increase in size of the patient's large mass in the right  lobe of the liver. Additional lesions are similar to the prior examination. 2. Extensive retroperitoneal and periportal adenopathy, as previously. 3. Peritoneal carcinomatosis. 4. Metastases at the lung bases. 5. Mild-to-moderate ascites. 6. Cirrhosis.  - CXR: RLL infiltrate, small bilateral pleural effusions, no pneumothorax (personally reviewed).     Pt was given Meropenem, Zosyn, Vancomycin, 3100 ml of NS, Dilaudid, Oxycodone, IV tylenol. He was admitted to med/surg for further management.        8/30  -  Patient seen and examined at bedside earlier today, afebrile, denies cp, dyspnea, abdominal pain only at night, tolerating some po intake, + bad taste, plan discussed  8/31- + epigastric abdominal pain, denies nausea, vomiting , poor po intake, + abdominal distension, plan discussed    Review of system- Rest of the review of system are negative except mentioned in HPI     Vital sings reviewed for last 24 h  T(C): 37.4 (08-31-24 @ 16:18), Max: 37.4 (08-31-24 @ 16:18)  T(F): 99.3 (08-31-24 @ 16:18), Max: 99.3 (08-31-24 @ 16:18)  HR: 101 (08-31-24 @ 16:18) (101 - 113)  BP: 107/68 (08-31-24 @ 16:18) (107/68 - 113/79)  RR: 18 (08-31-24 @ 16:18) (18 - 18)  SpO2: 97% (08-31-24 @ 16:18) (97% - 100%)        Physical exam:   General : NAD, appear to be of stated age , well groomed   NERVOUS SYSTEM:  Alert & Oriented X3, non- focal exam, Motor Strength 5/5 B/L upper and lower extremities; DTRs 2+ intact and symmetric  HEAD:  Atraumatic, Normocephalic  EYES: EOMI, PERRLA, conjunctiva and sclera clear  HEENT: Moist mucous membranes, Supple neck , No JVD  CHEST: BS decreased at bases bilaterally; No rales, no rhonchi, no wheezing  HEART: Regular rate and rhythm; No murmurs, no rubs or gallops  ABDOMEN: Soft, Non-tender, + distended; + ascites ,  Bowel sounds present, no guarding , no peritoneal irritation   GENITOURINARY- Voiding, no suprapubic tenderness  EXTREMITIES:  2+ Peripheral Pulses, No clubbing, cyanosis,   edema  MUSCULOSKELETAL:- No muscle tenderness, Muscle tone normal, No joint tenderness, no Joint swelling,  Joint ROM –normal   SKIN-no rash, no lesion    Labs radiologic and other test : all reviewed and interpreted :                         9.3    6.66  )-----------( 176      ( 30 Aug 2024 07:39 )             27.8     08-30    133<L>  |  101  |  10  ----------------------------<  95  3.6   |  26  |  0.43<L>    Ca    8.3<L>      30 Aug 2024 07:39    TPro  6.0  /  Alb  1.6<L>  /  TBili  15.3<H>  /  DBili  x   /  AST  239<H>  /  ALT  32  /  AlkPhos  239<H>  08-30    PT/INR - ( 28 Aug 2024 21:48 )   PT: 25.7 sec;   INR: 2.33 ratio         PTT - ( 28 Aug 2024 21:48 )  PTT:34.8 sec      CT Abdomen and Pelvis w/ IV Cont (08.28.24 @ 23:51) >  IMPRESSION:  1.   Metastatic disease with interval tendency to increase in size of the   hepatic metastases, some pulmonary metastases and abdominal   lymphadenopathy.    2.  Mild biliary duct dilatation in the right hepatic lobe despite   central CBD stent extending to the right biliary ductal system, no   pneumobilia. Stent patency is to be questioned. Stable left biliary   ductal percutaneous stent.    3. Right subhepatic percutaneous catheter without residual localized   collection but surrounding ascites. Correlate with drain output.    4. Peritoneal carcinomatosis        RECENT CULTURES:      Cardiac testing : reviewed   EKG      12 Lead ECG (08.28.24 @ 21:31) >  Ventricular Rate 128 BPM  QTC Calculation(Bazett) 481 ms  Sinus tachycardia  Incomplete right bundle branch block  ST & T wave abnormality, consider anterior ischemia  Abnormal ECG    Procedures :     Devices:     Current medications:  acetaminophen     Tablet .. 650 milliGRAM(s) Oral every 6 hours PRN  aluminum hydroxide/magnesium hydroxide/simethicone Suspension 30 milliLiter(s) Oral every 4 hours PRN  benzonatate 100 milliGRAM(s) Oral every 8 hours PRN  bisacodyl 5 milliGRAM(s) Oral daily PRN  cholecalciferol 2000 Unit(s) Oral daily  folic acid 1 milliGRAM(s) Oral daily  magnesium oxide 400 milliGRAM(s) Oral two times a day with meals  melatonin 3 milliGRAM(s) Oral at bedtime PRN  morphine  - Injectable 2 milliGRAM(s) IV Push every 4 hours PRN  morphine  IR 15 milliGRAM(s) Oral every 6 hours PRN  ondansetron Injectable 4 milliGRAM(s) IV Push every 8 hours PRN  oxyCODONE    IR 10 milliGRAM(s) Oral every 6 hours PRN  piperacillin/tazobactam IVPB.. 3.375 Gram(s) IV Intermittent every 8 hours  polyethylene glycol 3350 17 Gram(s) Oral daily PRN  rifAXIMin 550 milliGRAM(s) Oral two times a day  ursodiol Capsule 300 milliGRAM(s) Oral every 12 hours

## 2024-09-01 LAB
ALBUMIN SERPL ELPH-MCNC: 1.6 G/DL — LOW (ref 3.3–5)
ALP SERPL-CCNC: 279 U/L — HIGH (ref 40–120)
ALT FLD-CCNC: 32 U/L — SIGNIFICANT CHANGE UP (ref 12–78)
AMMONIA BLD-MCNC: 56 UMOL/L — HIGH (ref 11–32)
ANION GAP SERPL CALC-SCNC: 6 MMOL/L — SIGNIFICANT CHANGE UP (ref 5–17)
AST SERPL-CCNC: 267 U/L — HIGH (ref 15–37)
BILIRUB DIRECT SERPL-MCNC: 11.7 MG/DL — HIGH (ref 0–0.3)
BILIRUB INDIRECT FLD-MCNC: 3.2 MG/DL — HIGH (ref 0.2–1)
BILIRUB SERPL-MCNC: 14.9 MG/DL — HIGH (ref 0.2–1.2)
BUN SERPL-MCNC: 9 MG/DL — SIGNIFICANT CHANGE UP (ref 7–23)
CALCIUM SERPL-MCNC: 8.3 MG/DL — LOW (ref 8.5–10.1)
CHLORIDE SERPL-SCNC: 99 MMOL/L — SIGNIFICANT CHANGE UP (ref 96–108)
CO2 SERPL-SCNC: 25 MMOL/L — SIGNIFICANT CHANGE UP (ref 22–31)
CREAT SERPL-MCNC: 0.35 MG/DL — LOW (ref 0.5–1.3)
CRP SERPL-MCNC: 65 MG/L — HIGH
EGFR: 138 ML/MIN/1.73M2 — SIGNIFICANT CHANGE UP
GLUCOSE SERPL-MCNC: 91 MG/DL — SIGNIFICANT CHANGE UP (ref 70–99)
GRAM STN FLD: ABNORMAL
HCT VFR BLD CALC: 28.3 % — LOW (ref 39–50)
HGB BLD-MCNC: 9.2 G/DL — LOW (ref 13–17)
INR BLD: 1.93 RATIO — HIGH (ref 0.85–1.18)
MAGNESIUM SERPL-MCNC: 1.8 MG/DL — SIGNIFICANT CHANGE UP (ref 1.6–2.6)
MCHC RBC-ENTMCNC: 31.5 PG — SIGNIFICANT CHANGE UP (ref 27–34)
MCHC RBC-ENTMCNC: 32.5 GM/DL — SIGNIFICANT CHANGE UP (ref 32–36)
MCV RBC AUTO: 96.9 FL — SIGNIFICANT CHANGE UP (ref 80–100)
NT-PROBNP SERPL-SCNC: 232 PG/ML — HIGH (ref 0–125)
PHOSPHATE SERPL-MCNC: 2.4 MG/DL — LOW (ref 2.5–4.5)
PLATELET # BLD AUTO: 176 K/UL — SIGNIFICANT CHANGE UP (ref 150–400)
POTASSIUM SERPL-MCNC: 3.4 MMOL/L — LOW (ref 3.5–5.3)
POTASSIUM SERPL-SCNC: 3.4 MMOL/L — LOW (ref 3.5–5.3)
PROCALCITONIN SERPL-MCNC: 0.52 NG/ML — HIGH (ref 0.02–0.1)
PROT SERPL-MCNC: 6.2 GM/DL — SIGNIFICANT CHANGE UP (ref 6–8.3)
PROTHROM AB SERPL-ACNC: 21.4 SEC — HIGH (ref 9.5–13)
RBC # BLD: 2.92 M/UL — LOW (ref 4.2–5.8)
RBC # FLD: 16.2 % — HIGH (ref 10.3–14.5)
SODIUM SERPL-SCNC: 130 MMOL/L — LOW (ref 135–145)
WBC # BLD: 6.98 K/UL — SIGNIFICANT CHANGE UP (ref 3.8–10.5)
WBC # FLD AUTO: 6.98 K/UL — SIGNIFICANT CHANGE UP (ref 3.8–10.5)

## 2024-09-01 PROCEDURE — 99232 SBSQ HOSP IP/OBS MODERATE 35: CPT

## 2024-09-01 RX ORDER — POTASSIUM CHLORIDE 10 MEQ
20 TABLET, EXT RELEASE, PARTICLES/CRYSTALS ORAL
Refills: 0 | Status: COMPLETED | OUTPATIENT
Start: 2024-09-01 | End: 2024-09-01

## 2024-09-01 RX ORDER — SULFAMETHOXAZOLE AND TRIMETHOPRIM 800; 160 MG/1; MG/1
1 TABLET ORAL EVERY 12 HOURS
Refills: 0 | Status: DISCONTINUED | OUTPATIENT
Start: 2024-09-01 | End: 2024-09-05

## 2024-09-01 RX ORDER — SODIUM PHOSPHATE, MONOBASIC, MONOHYDRATE AND SODIUM PHOSPHATE, DIBASIC ANHYDROUS 276; 142 MG/ML; MG/ML
15 INJECTION, SOLUTION INTRAVENOUS ONCE
Refills: 0 | Status: COMPLETED | OUTPATIENT
Start: 2024-09-01 | End: 2024-09-01

## 2024-09-01 RX ORDER — ALBUMIN (HUMAN) 5 G/20ML
100 SOLUTION INTRAVENOUS EVERY 12 HOURS
Refills: 0 | Status: DISCONTINUED | OUTPATIENT
Start: 2024-09-01 | End: 2024-09-03

## 2024-09-01 RX ORDER — FUROSEMIDE 40 MG
20 TABLET ORAL EVERY 12 HOURS
Refills: 0 | Status: COMPLETED | OUTPATIENT
Start: 2024-09-01 | End: 2024-09-03

## 2024-09-01 RX ADMIN — Medication 1 MILLIGRAM(S): at 09:33

## 2024-09-01 RX ADMIN — Medication 2000 UNIT(S): at 09:32

## 2024-09-01 RX ADMIN — Medication 2 MILLIGRAM(S): at 15:59

## 2024-09-01 RX ADMIN — PIPERACILLIN SODIUM AND TAZOBACTAM SODIUM 25 GRAM(S): 3; .375 INJECTION, POWDER, FOR SOLUTION INTRAVENOUS at 22:58

## 2024-09-01 RX ADMIN — PIPERACILLIN SODIUM AND TAZOBACTAM SODIUM 25 GRAM(S): 3; .375 INJECTION, POWDER, FOR SOLUTION INTRAVENOUS at 16:40

## 2024-09-01 RX ADMIN — URSODIOL 300 MILLIGRAM(S): 500 TABLET, FILM COATED ORAL at 20:26

## 2024-09-01 RX ADMIN — Medication 20 MILLIEQUIVALENT(S): at 10:46

## 2024-09-01 RX ADMIN — PIPERACILLIN SODIUM AND TAZOBACTAM SODIUM 25 GRAM(S): 3; .375 INJECTION, POWDER, FOR SOLUTION INTRAVENOUS at 05:52

## 2024-09-01 RX ADMIN — Medication 20 MILLIEQUIVALENT(S): at 16:00

## 2024-09-01 RX ADMIN — Medication 2 MILLIGRAM(S): at 21:00

## 2024-09-01 RX ADMIN — Medication 15 MILLIGRAM(S): at 05:51

## 2024-09-01 RX ADMIN — Medication 2 MILLIGRAM(S): at 11:49

## 2024-09-01 RX ADMIN — URSODIOL 300 MILLIGRAM(S): 500 TABLET, FILM COATED ORAL at 09:32

## 2024-09-01 RX ADMIN — Medication 20 MILLIGRAM(S): at 16:38

## 2024-09-01 RX ADMIN — ZOLPIDEM TARTRATE 5 MILLIGRAM(S): 5 TABLET, FILM COATED ORAL at 22:07

## 2024-09-01 RX ADMIN — SODIUM PHOSPHATE, MONOBASIC, MONOHYDRATE AND SODIUM PHOSPHATE, DIBASIC ANHYDROUS 62.5 MILLIMOLE(S): 276; 142 INJECTION, SOLUTION INTRAVENOUS at 11:57

## 2024-09-01 RX ADMIN — MAGNESIUM OXIDE TAB 400 MG (240 MG ELEMENTAL MG) 400 MILLIGRAM(S): 400 (240 MG) TAB at 09:32

## 2024-09-01 RX ADMIN — MAGNESIUM OXIDE TAB 400 MG (240 MG ELEMENTAL MG) 400 MILLIGRAM(S): 400 (240 MG) TAB at 17:33

## 2024-09-01 RX ADMIN — Medication 15 MILLIGRAM(S): at 17:33

## 2024-09-01 RX ADMIN — ALBUMIN (HUMAN) 50 MILLILITER(S): 5 SOLUTION INTRAVENOUS at 13:25

## 2024-09-01 RX ADMIN — Medication 20 MILLIGRAM(S): at 09:32

## 2024-09-01 RX ADMIN — Medication 2 MILLIGRAM(S): at 10:45

## 2024-09-01 RX ADMIN — Medication 2 MILLIGRAM(S): at 20:26

## 2024-09-01 RX ADMIN — SULFAMETHOXAZOLE AND TRIMETHOPRIM 1 TABLET(S): 800; 160 TABLET ORAL at 20:26

## 2024-09-01 NOTE — CONSULT NOTE ADULT - ASSESSMENT
49-year-old male [facility] colon cancer has been treated for many years, most recently at [facility] [place] presented with right upper quadrant pain and now post drainage catheter placement for fluid collection around gall bladder.  He is again admitted with fever and decreased drainage from catheters. He has metastatic colon cancer currently treated at [facility] [place] with Dr Ramirez, most recent treatment has been panitumumab and following with Dr Ramirez.     1) Metastatic colon cancer  Patient currently on therapy with panitumumab but seems to have progressed  Information given to see us in our office for full NGS testing and to review [facility] [place] records in detail with him  WIll need to obtain ERIK WT status, MMR status, BRAF status, and TMB (tumor mutational burden status), TRK fusion status, etc from previous pathology reports  If tumor mutational burden level  elevated we can determine if he would be an immunotherapy candidate, this can  be done in the outpatient setting  based on previous lines of therapy can consider treatment with irinotecan + mikaela, or mikaela plus chemo doublet, cap/mikaela, trifluridine-tipiracil, regorafenib, fruquitinib etc but will discuss more when all records available and pt follows in our office  -Will discuss his case with [facility] specialist and to see if trials are available for him- will discuss with him in the days ahead    Thank you for the courtesy of this consultation and we will continue to follow as needed    Fei Crowley MD  [facility]  Cell: [phone]

## 2024-09-01 NOTE — PROVIDER CONTACT NOTE (CRITICAL VALUE NOTIFICATION) - TEST AND RESULT REPORTED:
Lactate 2.7
body fluid culture positive for gram negative rods
8/30 Body fluid culture prelim rare staphylococcus epidermidis
body fluid culture - numerous Stenotrophomonas maltophilia

## 2024-09-01 NOTE — PROGRESS NOTE ADULT - ASSESSMENT
49-year-old male with a significant history of asthma and colon cancer, initially diagnosed in 2019. He underwent neoadjuvant chemotherapy followed by surgical resection and adjuvant treatment. In 2022, he was found to have progressive disease with metastasis to the liver and lung, confirmed via biomarkers.    # metastatic colon ca  The patient has undergone multiple lines of therapy and is currently on panitumumab under the care of Dr. Blood.   He has had several hospitalizations and has experienced recurrent biliomas, which have been drained multiple times.   Recently, he has been unable to complete systemic therapy due to complications.  CT a/p with increased in hepatic mets and pulm mets w/ abd LAD, mild biliary duct dilatation in right hepatic lobe despite central cbd stent and peritoneal carcinomatosis   bili 13.9 today from 19   IR consulted for evaluation of drain - drain removed 8/30  pt's wife d/w Dr Blood last night.  no further plans for anticancer therapy from Onc perspective   palliative care following.  GOC conversations ongoing  hospice appropriate.  currently full code.      # GNR bacteremia   The patient presented with decreased drainage from his catheters and low-grade fevers. His most recent blood cultures were positive for Klebsiella oxytoca, and he was recommended to continue a 2-week course of antibiotics.   He reported high fevers, approximately 38.8°C (101.84°F), and decreased output from his drainage catheters.  biloma drain removed 8/30  Continue the current antibiotic regimen for Klebsiella oxytoca as previously recommended.- ID following - now on ZOSYN   BCx 8/30 pending    will follow daily     will update GERALDINE Nicole MD  [facility]  [phone]       49-year-old male with a significant history of asthma and colon cancer, initially diagnosed in 2019. He underwent neoadjuvant chemotherapy followed by surgical resection and adjuvant treatment. In 2022, he was found to have progressive disease with metastasis to the liver and lung, confirmed via biomarkers.    # metastatic colon ca  The patient has undergone multiple lines of therapy and is currently on panitumumab under the care of Dr. Blood.   He has had several hospitalizations and has experienced recurrent biliomas, which have been drained multiple times.   Recently, he has been unable to complete systemic therapy due to complications.  CT a/p with increased in hepatic mets and pulm mets w/ abd LAD, mild biliary duct dilatation in right hepatic lobe despite central cbd stent and peritoneal carcinomatosis   bili 14.9 today   IR consulted for evaluation of drain - right drain removed 8/30  pt's wife d/w Dr Blood 8/30.  no further plans for anticancer therapy from Onc perspective   palliative care following.  GOC conversations ongoing  currently full code.      # GNR bacteremia   The patient presented with decreased drainage from his catheters and low-grade fevers. His most recent blood cultures were positive for Klebsiella oxytoca, and he was recommended to continue a 2-week course of antibiotics.   He reported high fevers, approximately 38.8°C (101.84°F), and decreased output from his drainage catheters.  biloma drain removed 8/30  Continue the current antibiotic regimen for Klebsiella oxytoca as previously recommended.- ID following - now on ZOSYN   BCx 8/30 - negative   Fluid cultures -  stenotrophomonas maltophilia; ID following   pt afebrile       will follow daily     will update GERALDINE Nicole MD  [facility]  [phone]

## 2024-09-01 NOTE — PROGRESS NOTE ADULT - SUBJECTIVE AND OBJECTIVE BOX
INTERVAL HPI/OVERNIGHT EVENTS:  Patient S&E at bedside.   he denies any pain  pt's wife spoke to Dr Blood yesterday.  No plans for additional chemotherapy   may consider palliative care   bili 13.9 today   jaundiced +++    PAST MEDICAL & SURGICAL HISTORY:  Colon cancer      Hypertension      Metastasis to liver      Metastasis to lung      Obstructive jaundice      H/O sepsis      H/O bacteremia      History of cholangitis      History of biliary stent insertion      S/P hernia surgery      History of ablation of neoplasm of liver      S/P colon resection          FAMILY HISTORY:  FH: heart attack (Father)        Vital Signs Last 24 Hrs  T(C): 36.9 (09-01-24 @ 05:47), Max: 37.4 (08-31-24 @ 16:18)  T(F): 98.4 (09-01-24 @ 05:47), Max: 99.3 (08-31-24 @ 16:18)  HR: 115 (09-01-24 @ 05:47) (101 - 115)  BP: 111/78 (09-01-24 @ 05:47) (107/68 - 112/64)  BP(mean): 78 (08-31-24 @ 16:18) (78 - 78)  RR: 18 (09-01-24 @ 05:47) (18 - 18)  SpO2: 97% (09-01-24 @ 05:47) (97% - 98%)        PHYSICAL EXAM:    Constitutional: NAD  Eyes: EOMI, sclera icteric  Respiratory: CTA b/l,   Cardiovascular: RRR,   Gastrointestinal: soft, drain in place  Neurological: AAOx3    MEDICATIONS  (STANDING):  cholecalciferol 2000 Unit(s) Oral daily  folic acid 1 milliGRAM(s) Oral daily  furosemide    Tablet 20 milliGRAM(s) Oral daily  magnesium oxide 400 milliGRAM(s) Oral two times a day with meals  phytonadione   Solution 2.5 milliGRAM(s) Oral once  piperacillin/tazobactam IVPB.. 3.375 Gram(s) IV Intermittent every 8 hours  rifAXIMin 550 milliGRAM(s) Oral two times a day  sodium phosphate 15 milliMole(s)/250 mL IVPB 15 milliMole(s) IV Intermittent once  ursodiol Capsule 300 milliGRAM(s) Oral every 12 hours    MEDICATIONS  (PRN):  acetaminophen     Tablet .. 650 milliGRAM(s) Oral every 6 hours PRN Temp greater or equal to 38C (100.4F), Mild Pain (1 - 3)  aluminum hydroxide/magnesium hydroxide/simethicone Suspension 30 milliLiter(s) Oral every 4 hours PRN Dyspepsia  benzonatate 100 milliGRAM(s) Oral every 8 hours PRN for cough  bisacodyl 5 milliGRAM(s) Oral daily PRN Constipation  melatonin 3 milliGRAM(s) Oral at bedtime PRN Insomnia  morphine  - Injectable 2 milliGRAM(s) IV Push every 4 hours PRN Severe Pain (7 - 10)  morphine  IR 15 milliGRAM(s) Oral every 6 hours PRN Severe Pain (7 - 10)  ondansetron Injectable 4 milliGRAM(s) IV Push every 8 hours PRN Nausea and/or Vomiting  polyethylene glycol 3350 17 Gram(s) Oral daily PRN for constipation  traMADol 50 milliGRAM(s) Oral every 6 hours PRN Moderate Pain (4 - 6)        Allergies    No Known Allergies    Intolerances        LABS:                            9.2    6.98  )-----------( 176      ( 01 Sep 2024 06:48 )             28.3                            8.8    6.48  )-----------( 169      ( 31 Aug 2024 06:34 )             26.5                        9.3    6.66  )-----------( 176      ( 30 Aug 2024 07:39 )             27.8     08-30    133<L>  |  101  |  10  ----------------------------<  95  3.6   |  26  |  0.43<L>    Ca    8.3<L>      30 Aug 2024 07:39    TPro  6.0  /  Alb  1.6<L>  /  TBili  15.3<H>  /  DBili  x   /  AST  239<H>  /  ALT  32  /  AlkPhos  239<H>  08-30    PT/INR - ( 28 Aug 2024 21:48 )   PT: 25.7 sec;   INR: 2.33 ratio         PTT - ( 28 Aug 2024 21:48 )  PTT:34.8 sec  Urinalysis Basic - ( 30 Aug 2024 07:39 )    Color: x / Appearance: x / SG: x / pH: x  Gluc: 95 mg/dL / Ketone: x  / Bili: x / Urobili: x   Blood: x / Protein: x / Nitrite: x   Leuk Esterase: x / RBC: x / WBC x   Sq Epi: x / Non Sq Epi: x / Bacteria: x        RADIOLOGY & ADDITIONAL TESTS:  Studies reviewed.   INTERVAL HPI/OVERNIGHT EVENTS:  Patient S&E at bedside.   he denies any pain  pt's wife spoke to Dr Blood 8/30.  No plans for additional chemotherapy   may consider palliative care   bili 14.9 today   jaundiced +++  no acute complaints     PAST MEDICAL & SURGICAL HISTORY:  Colon cancer      Hypertension      Metastasis to liver      Metastasis to lung      Obstructive jaundice      H/O sepsis      H/O bacteremia      History of cholangitis      History of biliary stent insertion      S/P hernia surgery      History of ablation of neoplasm of liver      S/P colon resection          FAMILY HISTORY:  FH: heart attack (Father)        Vital Signs Last 24 Hrs  T(C): 36.9 (09-01-24 @ 05:47), Max: 37.4 (08-31-24 @ 16:18)  T(F): 98.4 (09-01-24 @ 05:47), Max: 99.3 (08-31-24 @ 16:18)  HR: 115 (09-01-24 @ 05:47) (101 - 115)  BP: 111/78 (09-01-24 @ 05:47) (107/68 - 112/64)  BP(mean): 78 (08-31-24 @ 16:18) (78 - 78)  RR: 18 (09-01-24 @ 05:47) (18 - 18)  SpO2: 97% (09-01-24 @ 05:47) (97% - 98%)        PHYSICAL EXAM:    Constitutional: jaundiced   Eyes: EOMI, sclera icteric  Respiratory: CTA b/l,   Cardiovascular: RRR,   Gastrointestinal: soft, drain in place  Neurological: AAOx3    MEDICATIONS  (STANDING):  cholecalciferol 2000 Unit(s) Oral daily  folic acid 1 milliGRAM(s) Oral daily  furosemide    Tablet 20 milliGRAM(s) Oral daily  magnesium oxide 400 milliGRAM(s) Oral two times a day with meals  phytonadione   Solution 2.5 milliGRAM(s) Oral once  piperacillin/tazobactam IVPB.. 3.375 Gram(s) IV Intermittent every 8 hours  rifAXIMin 550 milliGRAM(s) Oral two times a day  sodium phosphate 15 milliMole(s)/250 mL IVPB 15 milliMole(s) IV Intermittent once  ursodiol Capsule 300 milliGRAM(s) Oral every 12 hours    MEDICATIONS  (PRN):  acetaminophen     Tablet .. 650 milliGRAM(s) Oral every 6 hours PRN Temp greater or equal to 38C (100.4F), Mild Pain (1 - 3)  aluminum hydroxide/magnesium hydroxide/simethicone Suspension 30 milliLiter(s) Oral every 4 hours PRN Dyspepsia  benzonatate 100 milliGRAM(s) Oral every 8 hours PRN for cough  bisacodyl 5 milliGRAM(s) Oral daily PRN Constipation  melatonin 3 milliGRAM(s) Oral at bedtime PRN Insomnia  morphine  - Injectable 2 milliGRAM(s) IV Push every 4 hours PRN Severe Pain (7 - 10)  morphine  IR 15 milliGRAM(s) Oral every 6 hours PRN Severe Pain (7 - 10)  ondansetron Injectable 4 milliGRAM(s) IV Push every 8 hours PRN Nausea and/or Vomiting  polyethylene glycol 3350 17 Gram(s) Oral daily PRN for constipation  traMADol 50 milliGRAM(s) Oral every 6 hours PRN Moderate Pain (4 - 6)        Allergies    No Known Allergies    Intolerances        LABS:                            9.2    6.98  )-----------( 176      ( 01 Sep 2024 06:48 )             28.3                            8.8    6.48  )-----------( 169      ( 31 Aug 2024 06:34 )             26.5                        9.3    6.66  )-----------( 176      ( 30 Aug 2024 07:39 )             27.8     08-30    133<L>  |  101  |  10  ----------------------------<  95  3.6   |  26  |  0.43<L>    Ca    8.3<L>      30 Aug 2024 07:39    TPro  6.0  /  Alb  1.6<L>  /  TBili  15.3<H>  /  DBili  x   /  AST  239<H>  /  ALT  32  /  AlkPhos  239<H>  08-30    PT/INR - ( 28 Aug 2024 21:48 )   PT: 25.7 sec;   INR: 2.33 ratio         PTT - ( 28 Aug 2024 21:48 )  PTT:34.8 sec  Urinalysis Basic - ( 30 Aug 2024 07:39 )    Color: x / Appearance: x / SG: x / pH: x  Gluc: 95 mg/dL / Ketone: x  / Bili: x / Urobili: x   Blood: x / Protein: x / Nitrite: x   Leuk Esterase: x / RBC: x / WBC x   Sq Epi: x / Non Sq Epi: x / Bacteria: x        RADIOLOGY & ADDITIONAL TESTS:  Studies reviewed.

## 2024-09-01 NOTE — PROGRESS NOTE ADULT - SUBJECTIVE AND OBJECTIVE BOX
Subjective:  Chief complain :  Fever      HPI:  50 y/o M with PMH metastatic colon CA with liver and lung metastases on panitumumab (last dose 7/10/24) [place] [facility] [place], obstructive jaundice s/p biliary drain, sepsis, bacteremia, UTI, cholangitis, biloma s/p IR drain placement presents with fever. Pt states that he had a Tmax of 101.8F at home. He spoke to Dr. Michaels who advised him to come to the hosptial. He reports nausea and decreased drainage of right biliary drain (about 15 ml/day) when compared to left biliary drain (about 150-200 ml/day). Denies chills, chest pain, URI symptoms, cough, shortness of breath, chest pain, abdominal pain, vomiting, diarrhea, constipation, burning on urination. Please refer to recent admission below, pt was discharged on Levaquin and Flagyl which he completed yesterday. Pt was supposed to start chemotherapy and immunotherapy for his cancer 3 weeks ago but has been hospitalized.    Prior admission:   - 8/17/24: sepsis secondary to klebsiella oxytoca bacteremia, UTI with citrobacter, pseudomonas bacteremia, cholangitis d/t malignant biliary obstruction, biloma s/p IR drain placement, progression of metastatic disease -> discharged on Levaquin and Flagyl     ER course: HR . Labs: Hb 10.3 (baseline ~9), neutrophils 82.7%, INR 2.33, lactate 2.7 -> 1.8, glucose 124, albumin 2.0, total bilirubin 19.1, , . UA: unable to be performed due to color interference. RVP negative. EKG: Sinus tachycardia with  bpm, incomplete RBBB,  ms, no ST changes (personally reviewed).     Imaging:   - CT abd/pelvis: 1. Mild interval increase in size of the patient's large mass in the right  lobe of the liver. Additional lesions are similar to the prior examination. 2. Extensive retroperitoneal and periportal adenopathy, as previously. 3. Peritoneal carcinomatosis. 4. Metastases at the lung bases. 5. Mild-to-moderate ascites. 6. Cirrhosis.  - CXR: RLL infiltrate, small bilateral pleural effusions, no pneumothorax (personally reviewed).     Pt was given Meropenem, Zosyn, Vancomycin, 3100 ml of NS, Dilaudid, Oxycodone, IV tylenol. He was admitted to med/surg for further management.        8/30  -  Patient seen and examined at bedside earlier today, afebrile, denies cp, dyspnea, abdominal pain only at night, tolerating some po intake, + bad taste, plan discussed  8/31- + epigastric abdominal pain, denies nausea, vomiting , poor po intake, + abdominal distension, plan discussed  9/1 - + epigastric pain controlled , denies nausea, cp, dyspnea, cough, + abdominal distension, + swelling hands, tolerating some po intake    Review of system- Rest of the review of system are negative except mentioned in HPI    Vital sings reviewed for last 24 h  T(C): 36.7 (09-01-24 @ 08:58), Max: 37.4 (08-31-24 @ 16:18)  T(F): 98 (09-01-24 @ 08:58), Max: 99.3 (08-31-24 @ 16:18)  HR: 107 (09-01-24 @ 08:58) (101 - 115)  BP: 113/78 (09-01-24 @ 08:58) (107/68 - 113/78)  RR: 17 (09-01-24 @ 08:58) (17 - 18)  SpO2: 97% (09-01-24 @ 08:58) (97% - 98%)        Physical exam:   General : NAD, appear to be of stated age , well groomed   NERVOUS SYSTEM:  Alert & Oriented X3, non- focal exam, Motor Strength 5/5 B/L upper and lower extremities; DTRs 2+ intact and symmetric  HEAD:  Atraumatic, Normocephalic  EYES: EOMI, PERRLA, conjunctiva and sclera clear  HEENT: Moist mucous membranes, Supple neck , No JVD  CHEST: BS decreased at bases bilaterally; No rales, no rhonchi, no wheezing  HEART: Regular rate and rhythm; No murmurs, no rubs or gallops  ABDOMEN: Soft, Non-tender, + distended; + ascites ,  Bowel sounds present, no guarding , no peritoneal irritation   GENITOURINARY- Voiding, no suprapubic tenderness  EXTREMITIES:  2+ Peripheral Pulses, No clubbing, cyanosis,   edema  MUSCULOSKELETAL:- No muscle tenderness, Muscle tone normal, No joint tenderness, no Joint swelling,  Joint ROM –normal   SKIN-no rash, no lesion    Labs radiologic and other test : all reviewed and interpreted :                           9.2    6.98  )-----------( 176      ( 01 Sep 2024 06:48 )             28.3     09-01    130<L>  |  99  |  9   ----------------------------<  91  3.4<L>   |  25  |  0.35<L>    Ca    8.3<L>      01 Sep 2024 06:48  Phos  2.4     09-01  Mg     1.8     09-01    TPro  6.2  /  Alb  1.6<L>  /  TBili  14.9<H>  /  DBili  11.7<H>  /  AST  267<H>  /  ALT  32  /  AlkPhos  279<H>  09-01        LIVER FUNCTIONS - ( 01 Sep 2024 06:48 )  Alb: 1.6 g/dL / Pro: 6.2 gm/dL / ALK PHOS: 279 U/L / ALT: 32 U/L / AST: 267 U/L / GGT: x           < from: Xray Chest 1 View-PORTABLE IMMEDIATE (08.28.24 @ 22:09) >    IMPRESSION: Right superior mediastinal mass somewhat increased. Pulmonary   metastases again noted. Small infiltrate off right hilum is presently   suggested.         CT Abdomen and Pelvis w/ IV Cont (08.28.24 @ 23:51) >  IMPRESSION:  1.   Metastatic disease with interval tendency to increase in size of the   hepatic metastases, some pulmonary metastases and abdominal   lymphadenopathy.    2.  Mild biliary duct dilatation in the right hepatic lobe despite   central CBD stent extending to the right biliary ductal system, no   pneumobilia. Stent patency is to be questioned. Stable left biliary   ductal percutaneous stent.    3. Right subhepatic percutaneous catheter without residual localized   collection but surrounding ascites. Correlate with drain output.    4. Peritoneal carcinomatosis        RECENT CULTURES:      Cardiac testing : reviewed   EKG      12 Lead ECG (08.28.24 @ 21:31) >  Ventricular Rate 128 BPM  QTC Calculation(Bazett) 481 ms  Sinus tachycardia  Incomplete right bundle branch block  ST & T wave abnormality, consider anterior ischemia  Abnormal ECG    Procedures :     Devices:     Current medications:  acetaminophen     Tablet .. 650 milliGRAM(s) Oral every 6 hours PRN  aluminum hydroxide/magnesium hydroxide/simethicone Suspension 30 milliLiter(s) Oral every 4 hours PRN  benzonatate 100 milliGRAM(s) Oral every 8 hours PRN  bisacodyl 5 milliGRAM(s) Oral daily PRN  cholecalciferol 2000 Unit(s) Oral daily  folic acid 1 milliGRAM(s) Oral daily  magnesium oxide 400 milliGRAM(s) Oral two times a day with meals  melatonin 3 milliGRAM(s) Oral at bedtime PRN  morphine  - Injectable 2 milliGRAM(s) IV Push every 4 hours PRN  morphine  IR 15 milliGRAM(s) Oral every 6 hours PRN  ondansetron Injectable 4 milliGRAM(s) IV Push every 8 hours PRN  oxyCODONE    IR 10 milliGRAM(s) Oral every 6 hours PRN  piperacillin/tazobactam IVPB.. 3.375 Gram(s) IV Intermittent every 8 hours  polyethylene glycol 3350 17 Gram(s) Oral daily PRN  rifAXIMin 550 milliGRAM(s) Oral two times a day  ursodiol Capsule 300 milliGRAM(s) Oral every 12 hours

## 2024-09-01 NOTE — CONSULT NOTE ADULT - SUBJECTIVE AND OBJECTIVE BOX
48 y/o M with PMH metastatic colon CA with liver and lung metastases on panitumumab (last dose 7/10/24) who follows at [facility] [place], HTN, obstructive jaundice s/p biliary drain, sepsis, bacteremia, UTI, cholangitis, biloma s/p IR drain placement presents with fever. Pt states that he had a Tmax of 101.8F at home.  He reports nausea and decreased drainage of right biliary drain (about 15 ml/day) when compared to left biliary drain (about 150-200 ml/day). Denies chills, chest pain, URI symptoms, cough, shortness of breath, chest pain, abdominal pain, vomiting, diarrhea, constipation, burning on urination. Please refer to recent admission below, pt was discharged on Levaquin and Flagyl which he completed yesterday. Pt was supposed to start chemotherapy and immunotherapy for his cancer 3 weeks ago but has been hospitalized. CT imaging in the ER reveal mild interval increase in size of the patient's large mass in the right lobe of the liver. Pt started on IV abx and restarted home pain regimen of Dilaudid 4mg PO q4hr PRN as well as MSIR 2mg IV q4hr PRN severe pain while hospitalized. Palliative medicine is consulted for further assistance with complex GOC and pain and symptoms management in setting of progressive metastatic disease.     Examined pt at bedside, reports having some mild abdominal discomfort, RN at bedside with PRN dilaudid medication. When asked what brought him into the hospital, pt states that he had a fever and came in to be evaluated. He feels stressed about his biliary drain and feels it is not working properly and needs to be removed. When asked what the medical team has shared about the findings so far, he states that there is an infection and his goal is to stay in the hospital for a few days for IV abx and return home.     PAIN: (x )Yes   ( )No  Level: abdomen   Location: right abdomen   Intensity:   4 /10  Quality: constant pressure like  Aggravating Factors: n/a   Alleviating Factors: rest   Radiation: n/a  Duration/Timing: constant   Impact on ADLs: n/a     DYSPNEA: ( ) Yes  ( x) No    PAST MEDICAL & SURGICAL HISTORY:  Colon cancer      Hypertension      Metastasis to liver      Metastasis to lung      Obstructive jaundice      H/O sepsis      H/O bacteremia      History of cholangitis      History of biliary stent insertion      S/P hernia surgery      History of ablation of neoplasm of liver      S/P colon resection          SOCIAL HX:    Hx opiate tolerance ( )YES  ( )NO    Baseline ADLs  (Prior to Admission)  ( ) Independent   ( )Dependent    FAMILY HISTORY:  FH: heart attack (Father)    REVIEW OF SYSTEMS:  Constitutional: No fever, no chills, no headache.  HEENT:  No blurred vision, sore throat, earache, or congestion. No neck pain.  COR:  No chest pain. No palpitations.  Lungs:  No shortness of breath or cough.  GI:  No nausea, no vomiting, no diarrhea, no constipation. Abdominal pain.   :  No dysuria, frequency or urgency. No hematuria.  Musculoskeletal:  No joint pain or swelling or edema.  Psychiatric:  No anxiety, no depression.      All other systems reviewed and negative  Unable to obtain/Limited due to:      PHYSICAL EXAM:    Vital Signs Last 24 Hrs  T(C): 36.8 (30 Aug 2024 09:11), Max: 36.8 (29 Aug 2024 23:56)  T(F): 98.2 (30 Aug 2024 09:11), Max: 98.3 (29 Aug 2024 23:56)  HR: 119 (30 Aug 2024 09:11) (99 - 119)  BP: 111/77 (30 Aug 2024 09:11) (107/68 - 112/71)  BP(mean): --  RR: 18 (30 Aug 2024 09:11) (16 - 18)  SpO2: 97% (30 Aug 2024 09:11) (96% - 98%)    Parameters below as of 30 Aug 2024 09:11  Patient On (Oxygen Delivery Method): room air      Daily     Daily     PPSV2: 60  %      General: awake, alert, NAD   Lungs: clear to auscultation b/l   Cardiac: regular rate and rhythm   GI: distended, mild tenderness to palpation over RUQ  Ext: well perfused, no edema       LABS:                        9.3    6.66  )-----------( 176      ( 30 Aug 2024 07:39 )             27.8     08-30    133<L>  |  101  |  10  ----------------------------<  95  3.6   |  26  |  0.43<L>    Ca    8.3<L>      30 Aug 2024 07:39    TPro  6.0  /  Alb  1.6<L>  /  TBili  15.3<H>  /  DBili  x   /  AST  239<H>  /  ALT  32  /  AlkPhos  239<H>  08-30    PT/INR - ( 28 Aug 2024 21:48 )   PT: 25.7 sec;   INR: 2.33 ratio         PTT - ( 28 Aug 2024 21:48 )  PTT:34.8 sec  Albumin: Albumin: 1.6 g/dL (08-30 @ 07:39)      Allergies    No Known Allergies    Intolerances      MEDICATIONS  (STANDING):  cholecalciferol 2000 Unit(s) Oral daily  folic acid 1 milliGRAM(s) Oral daily  magnesium oxide 400 milliGRAM(s) Oral two times a day with meals  piperacillin/tazobactam IVPB.. 3.375 Gram(s) IV Intermittent every 8 hours  rifAXIMin 550 milliGRAM(s) Oral two times a day  ursodiol Capsule 300 milliGRAM(s) Oral every 12 hours    MEDICATIONS  (PRN):  acetaminophen     Tablet .. 650 milliGRAM(s) Oral every 6 hours PRN Temp greater or equal to 38C (100.4F), Mild Pain (1 - 3)  aluminum hydroxide/magnesium hydroxide/simethicone Suspension 30 milliLiter(s) Oral every 4 hours PRN Dyspepsia  benzonatate 100 milliGRAM(s) Oral every 8 hours PRN for cough  bisacodyl 5 milliGRAM(s) Oral daily PRN Constipation  HYDROmorphone   Tablet 4 milliGRAM(s) Oral every 4 hours PRN for severe pain  melatonin 3 milliGRAM(s) Oral at bedtime PRN Insomnia  morphine  - Injectable 2 milliGRAM(s) IV Push every 4 hours PRN Severe Pain (7 - 10)  ondansetron Injectable 4 milliGRAM(s) IV Push every 8 hours PRN Nausea and/or Vomiting  polyethylene glycol 3350 17 Gram(s) Oral daily PRN for constipation      RADIOLOGY/ADDITIONAL STUDIES:  
HPI:  48 y/o M with PMH metastatic colon CA with liver and lung metastases on panitumumab (last dose 7/10/24) who follows at [facility] [place], HTN, obstructive jaundice s/p biliary drain, sepsis, bacteremia, UTI, cholangitis, biloma s/p IR drain placement presents with fever. Pt states that he had a Tmax of 101.8F at home. He spoke to Dr. Michaels who advised him to come to the hosptial. He reports nausea and decreased drainage of right biliary drain (about 15 ml/day) when compared to left biliary drain (about 150-200 ml/day). Denies chills, chest pain, URI symptoms, cough, shortness of breath, chest pain, abdominal pain, vomiting, diarrhea, constipation, burning on urination. Please refer to recent admission below, pt was discharged on Levaquin and Flagyl which he completed yesterday. Pt was supposed to start chemotherapy and immunotherapy for his cancer 3 weeks ago but has been hospitalized.    Prior admission:   - 8/17/24: sepsis secondary to klebsiella oxytoca bacteremia, UTI with citrobacter, pseudomonas bacteremia, cholangitis d/t malignant biliary obstruction, biloma s/p IR drain placement, progression of metastatic disease -> discharged on Levaquin and Flagyl     ER course: HR . Labs: Hb 10.3 (baseline ~9), neutrophils 82.7%, INR 2.33, lactate 2.7 -> 1.8, glucose 124, albumin 2.0, total bilirubin 19.1, , . UA: unable to be performed due to color interference. RVP negative. EKG: Sinus tachycardia with  bpm, incomplete RBBB,  ms, no ST changes (personally reviewed).     Imaging:   - CT abd/pelvis: 1. Mild interval increase in size of the patient's large mass in the right  lobe of the liver. Additional lesions are similar to the prior examination. 2. Extensive retroperitoneal and periportal adenopathy, as previously. 3. Peritoneal carcinomatosis. 4. Metastases at the lung bases. 5. Mild-to-moderate ascites. 6. Cirrhosis.  - CXR: RLL infiltrate, small bilateral pleural effusions, no pneumothorax (personally reviewed).     Pt was given Meropenem, Zosyn, Vancomycin, 3100 ml of NS, Dilaudid, Oxycodone, IV tylenol. He was admitted to med/surg for further management.    (29 Aug 2024 11:04)  --------------      PAST MEDICAL & SURGICAL HISTORY:  Colon cancer      Hypertension      Metastasis to liver      Metastasis to lung      Obstructive jaundice      H/O sepsis      H/O bacteremia      History of cholangitis      History of biliary stent insertion      S/P hernia surgery      History of ablation of neoplasm of liver      S/P colon resection          Home Medications:  benzonatate 100 mg oral capsule: 1 cap(s) orally every 8 hours as needed for  cough (29 Aug 2024 09:00)  bisacodyl 5 mg oral delayed release tablet: 1 tab(s) orally once a day As needed Constipation (29 Aug 2024 09:02)  levoFLOXacin 750 mg oral tablet: 1 tab(s) orally once a day ***Course Complete*** (29 Aug 2024 09:00)  metroNIDAZOLE 500 mg oral tablet: 1 tab(s) orally 2 times a day ***Course Complete*** (29 Aug 2024 09:01)  polyethylene glycol 3350 oral powder for reconstitution: 17 gram(s) orally once a day as needed for  constipation (29 Aug 2024 09:02)  potassium phosphate-sodium phosphate 305 mg-700 mg oral tablet: 1 tab(s) orally 2 times a day ***5 days only*** (29 Aug 2024 09:02)      MEDICATIONS  (STANDING):  cholecalciferol 2000 Unit(s) Oral daily  folic acid 1 milliGRAM(s) Oral daily  magnesium oxide 400 milliGRAM(s) Oral two times a day with meals  piperacillin/tazobactam IVPB.. 3.375 Gram(s) IV Intermittent every 8 hours  rifAXIMin 550 milliGRAM(s) Oral two times a day  ursodiol Capsule 300 milliGRAM(s) Oral every 12 hours    MEDICATIONS  (PRN):  acetaminophen     Tablet .. 650 milliGRAM(s) Oral every 6 hours PRN Temp greater or equal to 38C (100.4F), Mild Pain (1 - 3)  aluminum hydroxide/magnesium hydroxide/simethicone Suspension 30 milliLiter(s) Oral every 4 hours PRN Dyspepsia  benzonatate 100 milliGRAM(s) Oral every 8 hours PRN for cough  bisacodyl 5 milliGRAM(s) Oral daily PRN Constipation  HYDROmorphone   Tablet 4 milliGRAM(s) Oral every 4 hours PRN for severe pain  melatonin 3 milliGRAM(s) Oral at bedtime PRN Insomnia  morphine  - Injectable 2 milliGRAM(s) IV Push every 4 hours PRN Severe Pain (7 - 10)  ondansetron Injectable 4 milliGRAM(s) IV Push every 8 hours PRN Nausea and/or Vomiting  polyethylene glycol 3350 17 Gram(s) Oral daily PRN for constipation      Allergies    No Known Allergies    Intolerances        SOCIAL HISTORY:    FAMILY HISTORY:  FH: heart attack (Father)        ROS  As above  Otherwise unremarkable    Vital Signs Last 24 Hrs  T(C): 36.4 (29 Aug 2024 16:45), Max: 37.4 (28 Aug 2024 21:50)  T(F): 97.6 (29 Aug 2024 16:45), Max: 99.4 (28 Aug 2024 21:50)  HR: 99 (29 Aug 2024 16:45) (88 - 133)  BP: 107/69 (29 Aug 2024 16:45) (101/76 - 124/86)  BP(mean): 94 (29 Aug 2024 06:49) (82 - 98)  RR: 16 (29 Aug 2024 16:45) (16 - 20)  SpO2: 98% (29 Aug 2024 16:45) (94% - 100%)    Parameters below as of 29 Aug 2024 16:45  Patient On (Oxygen Delivery Method): room air        Constitutional: NAD, jaundice  Respiratory: CTAB  Cardiovascular: S1 and S2, RRR  Gastrointestinal: BS+, soft, mild ascites  Extremities: No peripheral edema  Psychiatric: Normal mood, normal affect  Skin: No rashes    LABS:                        10.3   10.20 )-----------( 271      ( 28 Aug 2024 21:48 )             30.7     08-28    130<L>  |  97  |  14  ----------------------------<  124<H>  4.1   |  26  |  0.69    Ca    8.4<L>      28 Aug 2024 21:48    TPro  6.9  /  Alb  2.0<L>  /  TBili  19.1<H>  /  DBili  x   /  AST  230<H>  /  ALT  32  /  AlkPhos  256<H>  08-28    PT/INR - ( 28 Aug 2024 21:48 )   PT: 25.7 sec;   INR: 2.33 ratio         PTT - ( 28 Aug 2024 21:48 )  PTT:34.8 sec  LIVER FUNCTIONS - ( 28 Aug 2024 21:48 )  Alb: 2.0 g/dL / Pro: 6.9 gm/dL / ALK PHOS: 256 U/L / ALT: 32 U/L / AST: 230 U/L / GGT: x             RADIOLOGY & ADDITIONAL STUDIES:
Patient is a 49y old  Male who presents with a chief complaint of FEVER (29 Aug 2024 09:52)    HPI:  49M with CA with liver and lung metastases on panitumumab, HTN, recurrent polymicrobial bacteremia due to recurrent acute cholangitis in setting of malignant biliary obstruction, s/p biliary drain placement presents 8/29 for decreased drainage from his catheters and low-grade fevers.   Most recently, patient was just discharged 8/17 for Polymicrobial Bacteremia, Kleb oxytoca, Pseudomonas putida due to Recurrent Ascending Cholangitis likely due to Malignant biliary obstruction, treated with IV antibiotic and eventually completed 2-weeks of PO antibiotic on 8/26/2024  Afebrile on admission, on RA  No leukocytosis  Elevated AST/ALT in 200s  Bili 2  CTAP with Mild interval increase in size of the patient&apos;s large mass in the rightmlobe of the liver. Additional lesions are similar to the prior examination.mExtensive retroperitoneal and periportal adenopathy, as previously., Peritoneal carcinomatosis., Metastases at the lung bases. Mild-to-moderate ascites.  RVP negative    Prior cultures:  BCx 8/1  Kleb oxytoca, Pseudomonas putida  BCx 8/4 NGTD  BCx 8/8 with Pseudomonas putida   BCx 8/12 NGTD      PAST MEDICAL & SURGICAL HISTORY:  Colon cancer      Hypertension      History of biliary stent insertion      S/P hernia surgery      History of ablation of neoplasm of liver      S/P colon resection        FAMILY HISTORY:    Social Hx: Denies alcohol, tobacco, recreational drug use      Allergies  No Known Allergies    ANTIMICROBIALS (past 90 days)  MEDICATIONS  (STANDING):    piperacillin/tazobactam IVPB...   200 mL/Hr IV Intermittent (08-28-24 @ 23:03)    vancomycin  IVPB.   500 mL/Hr IV Intermittent (08-28-24 @ 23:57)        ACTIVE ANTIMICROBIALS  piperacillin/tazobactam IVPB.. 3.375 every 8 hours (8/28 --- )  rifAXIMin 550 two times a day    MEDICATIONS  (STANDING):  acetaminophen     Tablet .. 650 every 6 hours PRN  aluminum hydroxide/magnesium hydroxide/simethicone Suspension 30 every 4 hours PRN  benzonatate 100 every 8 hours PRN  bisacodyl 5 daily PRN  HYDROmorphone   Tablet 4 every 4 hours PRN  melatonin 3 at bedtime PRN  morphine  - Injectable 2 every 4 hours PRN  ondansetron Injectable 4 every 8 hours PRN  polyethylene glycol 3350 17 daily PRN  ursodiol Capsule 300 every 12 hours      REVIEW OF SYSTEMS  [  ] ROS unobtainable because:    [ x ] All other systems negative except as noted below:	    Constitutional:  [x ] fever [ ] chills  [ ] weight loss  [ ] weakness  Skin:  [ ] rash [ ] phlebitis	  Eyes: [ ] icterus [ ] pain  [ ] discharge	  ENMT: [ ] sore throat  [ ] thrush [ ] ulcers [ ] exudates  Respiratory: [ ] dyspnea [ ] hemoptysis [ ] cough [ ] sputum	  Cardiovascular:  [ ] chest pain [ ] palpitations [ ] edema	  Gastrointestinal:  [ ] nausea [ ] vomiting [ ] diarrhea [ ] constipation [ ] pain	  Genitourinary:  [ ] dysuria [ ] frequency [ ] hematuria [ ] discharge [ ] flank pain  [ ] incontinence  Musculoskeletal:  [ ] myalgias [ ] arthralgias [ ] arthritis  [ ] back pain  Neurological:  [ ] headache [ ] seizures  [ ] confusion/altered mental status  Psychiatric:  [ ] anxiety [ ] depression	  Hematology/Lymphatics:  [ ] lymphadenopathy  Endocrine:  [ ] adrenal [ ] thyroid  Allergic/Immunologic:	 [ ] transplant [ ] seasonal    Vital Signs Last 24 Hrs  T(C): 36.3 (29 Aug 2024 08:40), Max: 37.4 (28 Aug 2024 21:50)  T(F): 97.4 (29 Aug 2024 08:40), Max: 99.4 (28 Aug 2024 21:50)  HR: 98 (29 Aug 2024 08:40) (88 - 133)  BP: 117/74 (29 Aug 2024 08:40) (101/76 - 124/86)  BP(mean): 94 (29 Aug 2024 06:49) (82 - 98)  RR: 16 (29 Aug 2024 08:40) (16 - 20)  SpO2: 98% (29 Aug 2024 08:40) (94% - 100%)    Parameters below as of 29 Aug 2024 08:40  Patient On (Oxygen Delivery Method): room air        Physical Exam:  Constitutional:  well preserved, comfortable  Head/Eyes: +icterus  LUNGS:  CTA  CVS:  regular rhythm  Abd:  soft, non-tender; +distended +drainage  Ext:  no edema  Vascular:  IV site no erythema tenderness or discharge  Neuro: AAO X 3, non- focal      Labs: all available labs reviewed                        10.3   10.20 )-----------( 271      ( 28 Aug 2024 21:48 )             30.7     08-28    130<L>  |  97  |  14  ----------------------------<  124<H>  4.1   |  26  |  0.69    Ca    8.4<L>      28 Aug 2024 21:48    TPro  6.9  /  Alb  2.0<L>  /  TBili  19.1<H>  /  DBili  x   /  AST  230<H>  /  ALT  32  /  AlkPhos  256<H>  08-28     LIVER FUNCTIONS - ( 28 Aug 2024 21:48 )  Alb: 2.0 g/dL / Pro: 6.9 gm/dL / ALK PHOS: 256 U/L / ALT: 32 U/L / AST: 230 U/L / GGT: x           Urinalysis Basic - ( 29 Aug 2024 00:47 )    Color: Other / Appearance: Clear / SG: See Note / pH: x  Gluc: x / Ketone: See Note  / Bili: See Note / Urobili: See Note   Blood: x / Protein: See Note / Nitrite: See Note   Leuk Esterase: See Note / RBC: 2 /HPF / WBC 4 /HPF   Sq Epi: x / Non Sq Epi: x / Bacteria: Few /HPF          Radiology: all available radiological tests reviewed  < from: CT Abdomen and Pelvis w/ IV Cont (08.28.24 @ 23:51) >  IMPRESSION:  1.   Mild interval increase in size of the patient&apos;s large mass in   the right  lobe of the liver. Additional lesions are similar to the prior   examination.  2.   Extensive retroperitoneal and periportal adenopathy, as previously.  3.   Peritoneal carcinomatosis.  4.   Metastases at the lung bases.  5.   Mild-to-moderate ascites.  6.   Cirrhosis.    < end of copied text >  < from: Xray Chest 1 View-PORTABLE IMMEDIATE (08.28.24 @ 22:09) >  IMPRESSION: Right superior mediastinal mass somewhat increased. Pulmonary   metastases again noted. Small infiltrate off right hilum is presently   suggested.    < end of copied text >      Advanced directives addressed: full resuscitation
HPI:  The patient is a 49-year-old male with a significant history of asthma and colon cancer, initially diagnosed in 2019. He underwent neoadjuvant chemotherapy followed by surgical resection and adjuvant treatment. In 2022, he was found to have progressive disease with metastasis to the liver and lung, confirmed via biomarkers.  The patient has undergone multiple lines of therapy and is currently on panitumumab under the care of Dr. Blood. He has had several hospitalizations and has experienced recurrent myelomas, which have been drained multiple times. Recently, he has been unable to complete systemic therapy due to complications.  The patient presented with decreased drainage from his catheters and low-grade fevers. His most recent blood cultures were positive for Klebsiella oxytoca, and he was recommended to continue a 2-week course of antibiotics. He reported high fevers, approximately 38.8°C (101.84°F), and decreased output from his drainage catheters.  Upon examination, the patient did not exhibit significant pain but had low-grade fever. Blood work revealed an AST of 230, ALP of 256, ALT of 32, and sodium of 130. Hemoglobin was 10.3, and hematocrit was 30.7. Imaging showed an increase in the size of the right lobe liver lesion compared to previous scans, with stable retroperitoneal and periportal lymph nodes.    PAST MEDICAL & SURGICAL HISTORY:  Colon cancer      Hypertension      History of biliary stent insertion      S/P hernia surgery      History of ablation of neoplasm of liver      S/P colon resection          Allergies    No Known Allergies    Intolerances        MEDICATIONS  (STANDING):  cholecalciferol 2000 Unit(s) Oral daily  folic acid 1 milliGRAM(s) Oral daily  magnesium oxide 400 milliGRAM(s) Oral two times a day with meals  piperacillin/tazobactam IVPB.. 3.375 Gram(s) IV Intermittent every 8 hours  rifAXIMin 550 milliGRAM(s) Oral two times a day  ursodiol Capsule 300 milliGRAM(s) Oral every 12 hours    MEDICATIONS  (PRN):  acetaminophen     Tablet .. 650 milliGRAM(s) Oral every 6 hours PRN Temp greater or equal to 38C (100.4F), Mild Pain (1 - 3)  aluminum hydroxide/magnesium hydroxide/simethicone Suspension 30 milliLiter(s) Oral every 4 hours PRN Dyspepsia  benzonatate 100 milliGRAM(s) Oral every 8 hours PRN for cough  bisacodyl 5 milliGRAM(s) Oral daily PRN Constipation  HYDROmorphone   Tablet 4 milliGRAM(s) Oral every 4 hours PRN for severe pain  melatonin 3 milliGRAM(s) Oral at bedtime PRN Insomnia  morphine  - Injectable 2 milliGRAM(s) IV Push every 4 hours PRN Severe Pain (7 - 10)  ondansetron Injectable 4 milliGRAM(s) IV Push every 8 hours PRN Nausea and/or Vomiting  polyethylene glycol 3350 17 Gram(s) Oral daily PRN for constipation      FAMILY HISTORY:      SOCIAL HISTORY: No EtOH, no tobacco    REVIEW OF SYSTEMS:    CONSTITUTIONAL:FEVER  EYES/ENT: No visual changes;  No vertigo or throat pain   NECK: No pain or stiffness  RESPIRATORY: No cough, wheezing, hemoptysis; No shortness of breath  CARDIOVASCULAR: No chest pain or palpitations  GASTROINTESTINAL: No abdominal or epigastric pain. No nausea, vomiting, or hematemesis; No diarrhea or constipation. No melena or hematochezia.  GENITOURINARY: No dysuria, frequency or hematuria  NEUROLOGICAL: No numbness or weakness  SKIN: No itching, burning, rashes, or lesions   All other review of systems is negative unless indicated above.    Height (cm): 185.4 (08-28 @ 21:13)  Weight (kg): 100 (08-28 @ 22:19)  BMI (kg/m2): 29.1 (08-28 @ 22:19)  BSA (m2): 2.24 (08-28 @ 22:19)    T(F): 97.4 (08-29-24 @ 08:40), Max: 99.4 (08-28-24 @ 21:50)  HR: 98 (08-29-24 @ 08:40)  BP: 117/74 (08-29-24 @ 08:40)  RR: 16 (08-29-24 @ 08:40)  SpO2: 98% (08-29-24 @ 08:40)  Wt(kg): --    GENERAL: NAD, well-developed  HEAD:  Atraumatic, Normocephalic  EYES: EOMI, PERRLA, conjunctiva and ICTERIC   NECK: Supple, No JVD  CHEST/LUNG: Clear to auscultation bilaterally; No wheeze  HEART: Regular rate and rhythm; No murmurs, rubs, or gallops  ABDOMEN: Soft, Nontender, Nondistended; TWO abdominal drains, RIGHT not draining, non tender, leakage around cathteter site  EXTREMITIES:  2+ Peripheral Pulses, No clubbing, cyanosis, or edema  NEUROLOGY: non-focal  SKIN: No rashes or lesions                          10.3   10.20 )-----------( 271      ( 28 Aug 2024 21:48 )             30.7       08-28    130<L>  |  97  |  14  ----------------------------<  124<H>  4.1   |  26  |  0.69    Ca    8.4<L>      28 Aug 2024 21:48    TPro  6.9  /  Alb  2.0<L>  /  TBili  19.1<H>  /  DBili  x   /  AST  230<H>  /  ALT  32  /  AlkPhos  256<H>  08-28          PT/INR - ( 28 Aug 2024 21:48 )   PT: 25.7 sec;   INR: 2.33 ratio         PTT - ( 28 Aug 2024 21:48 )  PTT:34.8 sec    Ascites Fl Ascites Fluid  08-15 @ 15:07   No growth at 5 days  --    No polymorphonuclear leukocytes seen  No organisms seen  by cytocentrifuge      .Blood None  08-12 @ 09:25   No growth at 5 days  --  --      .Blood None  08-12 @ 09:24   No growth at 5 days  --  --      .Blood None  08-08 @ 20:09   Growth in aerobic bottle: Pseudomonas putida group  Direct identification is available within approximately 3-5  hours either by Blood Panel Multiplexed PCR or Direct  MALDI-TOF. Details: [email or web address]  --  Blood Culture PCR  Pseudomonas putida group      .Body Fluid SUB HEPATIC FLUID COLLECTION  08-05 @ 15:59   No growth at 5 days  --    No organisms seen per oil power field  Rare polymorphonuclear leukocytes per low power field      .Blood None  08-04 @ 07:15   No growth at 5 days  --  --      .Urine None  08-01 @ 15:34   10,000 - 49,000 CFU/mL Citrobacter koseri  --  Citrobacter koseri      .Blood None  08-01 @ 15:30   Growth in aerobic and anaerobic bottles: Klebsiella oxytoca/Raoultella  ornithinolytica  Growth in aerobic bottle: Pseudomonas putida group  See previous culture [id number]  --  Blood Culture PCR  Klebsiella oxytoca /Raoutella ornithinolytica  Pseudomonas putida group      Bile Bile Fluid  07-16 @ 18:05   No acid-fast bacilli isolated at 5 weeks.  --  --      .Blood None  07-11 @ 16:17   No growth at 5 days  --  --      .Blood Blood-Peripheral  07-11 @ 15:29   No growth at 5 days  --  --      
Reason for consult: metastatic colon cancer    HPI:  48 y/o M with PMH metastatic colon CA with liver and lung metastases on panitumumab (last dose 7/10/24) who [place] [facility] [place], [place]N, obstructive jaundice s/p biliary drain, sepsis, bacteremia, UTI, cholangitis, biloma s/p IR drain placement presents with fever. He was recently admitted for similar complaint pt was discharged on Levaquin and Flagyl which he completed yesterday. Pt was supposed to start chemotherapy and immunotherapy for his cancer 3 weeks ago but has been hospitalized. He has been following at [facility] [place] and he states he would like to seek other opinion to see if any other lines of therapy are possible for his metastatic colon cancer.        PAST MEDICAL & SURGICAL HISTORY:  Colon cancer      Hypertension      Metastasis to liver      Metastasis to lung      Obstructive jaundice      H/O sepsis      H/O bacteremia      History of cholangitis      History of biliary stent insertion      S/P hernia surgery      History of ablation of neoplasm of liver      S/P colon resection          FAMILY HISTORY:  FH: heart attack (Father)        Alochol: Denied  Smoking: Nonsmoker  Drug Use: Denied  Marital Status:         Allergies    No Known Allergies    Intolerances        MEDICATIONS  (STANDING):  albumin human 25% IVPB 100 milliLiter(s) IV Intermittent every 12 hours  cholecalciferol 2000 Unit(s) Oral daily  folic acid 1 milliGRAM(s) Oral daily  furosemide   Injectable 20 milliGRAM(s) IV Push every 12 hours  magnesium oxide 400 milliGRAM(s) Oral two times a day with meals  piperacillin/tazobactam IVPB.. 3.375 Gram(s) IV Intermittent every 8 hours  rifAXIMin 550 milliGRAM(s) Oral two times a day  trimethoprim  160 mG/sulfamethoxazole 800 mG 1 Tablet(s) Oral every 12 hours  ursodiol Capsule 300 milliGRAM(s) Oral every 12 hours    MEDICATIONS  (PRN):  acetaminophen     Tablet .. 650 milliGRAM(s) Oral every 6 hours PRN Temp greater or equal to 38C (100.4F), Mild Pain (1 - 3)  aluminum hydroxide/magnesium hydroxide/simethicone Suspension 30 milliLiter(s) Oral every 4 hours PRN Dyspepsia  benzonatate 100 milliGRAM(s) Oral every 8 hours PRN for cough  bisacodyl 5 milliGRAM(s) Oral daily PRN Constipation  morphine  - Injectable 2 milliGRAM(s) IV Push every 4 hours PRN Severe Pain (7 - 10)  morphine  IR 15 milliGRAM(s) Oral every 6 hours PRN Severe Pain (7 - 10)  ondansetron Injectable 4 milliGRAM(s) IV Push every 8 hours PRN Nausea and/or Vomiting  polyethylene glycol 3350 17 Gram(s) Oral daily PRN for constipation  zolpidem 5 milliGRAM(s) Oral at bedtime PRN Insomnia      ROS  +fever    T(C): 36.8 (09-01-24 @ 15:40), Max: 37.1 (08-31-24 @ 22:25)  HR: 104 (09-01-24 @ 15:40) (102 - 115)  BP: 108/72 (09-01-24 @ 15:40) (108/72 - 113/78)  RR: 18 (09-01-24 @ 15:40) (17 - 18)  SpO2: 98% (09-01-24 @ 15:40) (97% - 98%)  Wt(kg): --    PE  NAD  Awake, alert, jaundiced  + scleral icterus  No rash grossly  FROM                          9.2    6.98  )-----------( 176      ( 01 Sep 2024 06:48 )             28.3       09-01    130<L>  |  99  |  9   ----------------------------<  91  3.4<L>   |  25  |  0.35<L>    Ca    8.3<L>      01 Sep 2024 06:48  Phos  2.4     09-01  Mg     1.8     09-01    TPro  6.2  /  Alb  1.6<L>  /  TBili  14.9<H>  /  DBili  11.7<H>  /  AST  267<H>  /  ALT  32  /  AlkPhos  279<H>  09-01  
Chief complaint:  Patient is a 49y old  Male who presents with a chief complaint of low output from biloma drain      HPI:  48 y/o M with PMH metastatic colon CA with liver and lung metastases on panitumumab (last dose 7/10/24) who follows at [facility], HTN, obstructive jaundice s/p biliary drain, sepsis, bacteremia, UTI, cholangitis, biloma s/p IR drain placement presents with fever. Pt states that he had a Tmax of 101.8F at home. He spoke to Dr. Michaels who advised him to come to the hosptial. He reports nausea and decreased drainage of right biliary drain (about 15 ml/day) when compared to left biliary drain (about 150-200 ml/day). IR consulted for evaluation. Pt seen at bedside, denied chills, chest pain, URI symptoms, cough, shortness of breath, chest pain, abdominal pain, vomiting, diarrhea    Allergies  No Known Allergies    MEDICATIONS  (STANDING):  cholecalciferol 2000 Unit(s) Oral daily  folic acid 1 milliGRAM(s) Oral daily  magnesium oxide 400 milliGRAM(s) Oral two times a day with meals  piperacillin/tazobactam IVPB.. 3.375 Gram(s) IV Intermittent every 8 hours  rifAXIMin 550 milliGRAM(s) Oral two times a day  ursodiol Capsule 300 milliGRAM(s) Oral every 12 hours    MEDICATIONS  (PRN):  acetaminophen     Tablet .. 650 milliGRAM(s) Oral every 6 hours PRN Temp greater or equal to 38C (100.4F), Mild Pain (1 - 3)  aluminum hydroxide/magnesium hydroxide/simethicone Suspension 30 milliLiter(s) Oral every 4 hours PRN Dyspepsia  benzonatate 100 milliGRAM(s) Oral every 8 hours PRN for cough  bisacodyl 5 milliGRAM(s) Oral daily PRN Constipation  HYDROmorphone   Tablet 4 milliGRAM(s) Oral every 4 hours PRN for severe pain  melatonin 3 milliGRAM(s) Oral at bedtime PRN Insomnia  morphine  - Injectable 2 milliGRAM(s) IV Push every 4 hours PRN Severe Pain (7 - 10)  ondansetron Injectable 4 milliGRAM(s) IV Push every 8 hours PRN Nausea and/or Vomiting  polyethylene glycol 3350 17 Gram(s) Oral daily PRN for constipation      PAST MEDICAL & SURGICAL HISTORY:  Colon cancer      Hypertension      Metastasis to liver      Metastasis to lung      Obstructive jaundice      H/O sepsis      H/O bacteremia      History of cholangitis      History of biliary stent insertion      S/P hernia surgery      History of ablation of neoplasm of liver      S/P colon resection          FAMILY HISTORY:  FH: heart attack (Father)      Vital Signs Last 24 Hrs  T(C): 36.3 (29 Aug 2024 08:40), Max: 37.4 (28 Aug 2024 21:50)  T(F): 97.4 (29 Aug 2024 08:40), Max: 99.4 (28 Aug 2024 21:50)  HR: 98 (29 Aug 2024 08:40) (88 - 133)  BP: 117/74 (29 Aug 2024 08:40) (101/76 - 124/86)  BP(mean): 94 (29 Aug 2024 06:49) (82 - 98)  RR: 16 (29 Aug 2024 08:40) (16 - 20)  SpO2: 98% (29 Aug 2024 08:40) (94% - 100%)    Parameters below as of 29 Aug 2024 08:40  Patient On (Oxygen Delivery Method): room air        GENERAL APPEARANCE: Well developed, in no acute distress.    LUNGS: Auscultation of the lungs revealed normal breath sounds without any other adventitious sounds or rubs.    CARDIOVASCULAR: There was a regular rate and rhythm    ABDOMEN: Distended, nontender    NEUROLOGIC: Alert and oriented x 3. Normal affect.       CBC                        10.3   10.20 )-----------( 271      ( 28 Aug 2024 21:48 )             30.7       Chemistry  08-28    130<L>  |  97  |  14  ----------------------------<  124<H>  4.1   |  26  |  0.69    Ca    8.4<L>      28 Aug 2024 21:48    TPro  6.9  /  Alb  2.0<L>  /  TBili  19.1<H>  /  DBili  x   /  AST  230<H>  /  ALT  32  /  AlkPhos  256<H>  08-28    coags  PT/INR - ( 28 Aug 2024 21:48 )   PT: 25.7 sec;   INR: 2.33 ratio    PTT - ( 28 Aug 2024 21:48 )  PTT:34.8 sec      < from: CT Abdomen and Pelvis w/ IV Cont (08.28.24 @ 23:51) >  Liver: Large mass in the right lobe of the liver measures approximately   10.6 x  7.5 cm, previously 8.6 x 7.1 cm. Additional hypoattenuating liver lesions   are  similar to the prior examination. Nodular liver contour consistent with  cirrhosis.  Gallbladder and biliary ducts: Biliary stent again noted.  Pancreas: Normal. No ductal dilation.    < end of copied text >

## 2024-09-01 NOTE — CONSULT NOTE ADULT - CONSULT REASON
Ascites
CRC
Sepsis
50yo M with biliary obstruction 2/2 metastatic colon CA to liver s/p IR left biliary drain, and biloma s/p IR biloma drain p/w fever, elevated bilirubin, low output from biloma drain
met colon cancer
complex GOC and pain and symptoms management

## 2024-09-01 NOTE — CONSULT NOTE ADULT - CONSULT REQUESTED DATE/TIME
01-Sep-2024 16:47
29-Aug-2024 09:45
29-Aug-2024 11:05
30-Aug-2024 09:30
29-Aug-2024 17:34
29-Aug-2024 13:50

## 2024-09-01 NOTE — PROGRESS NOTE ADULT - ASSESSMENT
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 Mcnair   - IR consult - right bilary drain removed  - biliary fluid 1 of 2 cultures positive for stenotrophomonas maltophilia , staph epidermitis - will d/w ID team  - repeat blood cultures 8/30 - neg  - 9/1 - added bactrim DS    # Right biliary drain with decreased drainage   - Drains exchanged 8/22 per IR   - SCDs for now if procedure needed   - Pt had breakfast already this morning -> will need to be NPO if intervention planned   - IR consult - Dr. Mascorro   - right drain removed 8/30/24     3. Hyperbilirubinemia, transaminitis, obstructive jaundice secondary to increase in size of large mass in the right lobe of the liver and progression of metastatic cancer   - Trend LFTs and bilirubin   - Heme/Onc consult - Dr. Arguello   -second opinion [facility] oncology consult requested Dr. Gonzalez  - Bili 19--> 15--> 13--> 14.9    4. Moderate ascites , Hypervolumic hyponatremia  - May need paracentesis, will f/u with IR and GI   - GI consult - Dr. Quintero   - start IV albumin with IV lasix  - monitor Na, daily weight    5. Hypoalbuminemia   - Albumin 2.0   - Nutrition consult  - add supplements    6. History of metastatic colon CA with liver and lung metastases on panitumumab (last dose 7/10/24) who follows at [facility] [place], HTN, obstructive jaundice s/p biliary drain, sepsis, bacteremia, UTI, cholangitis, biloma s/p IR drain placement  - c/w home medications; verified with pt at the bedside   - Hb 10.3 (baseline ~9), INR 2.33, glucose 124, monitor closely     7. Coagulopathy  - INR > 2--> <2  - 8/31 - vit K 2.5 mg x1 dose    8. Abdominal pain  - stop dialudid - hepatic excretion   - trial of morphine PO prn with IV morphine prn    9. Insomnia  - ambien    DVT ppx: SCDs for now -> advance to pharmacological if no planned procedures   Code status: Full code   Emergency contact: Pt will update his wife    Dispo - IV abx, IV albumin, IV lasix, ID team follows ,  oncology [facility] team

## 2024-09-02 LAB
-  CLINDAMYCIN: SIGNIFICANT CHANGE UP
-  CLINDAMYCIN: SIGNIFICANT CHANGE UP
-  ERYTHROMYCIN: SIGNIFICANT CHANGE UP
-  ERYTHROMYCIN: SIGNIFICANT CHANGE UP
-  GENTAMICIN: SIGNIFICANT CHANGE UP
-  GENTAMICIN: SIGNIFICANT CHANGE UP
-  LEVOFLOXACIN: SIGNIFICANT CHANGE UP
-  MINOCYCLINE: SIGNIFICANT CHANGE UP
-  OXACILLIN: SIGNIFICANT CHANGE UP
-  OXACILLIN: SIGNIFICANT CHANGE UP
-  PENICILLIN: SIGNIFICANT CHANGE UP
-  PENICILLIN: SIGNIFICANT CHANGE UP
-  RIFAMPIN: SIGNIFICANT CHANGE UP
-  RIFAMPIN: SIGNIFICANT CHANGE UP
-  TETRACYCLINE: SIGNIFICANT CHANGE UP
-  TETRACYCLINE: SIGNIFICANT CHANGE UP
-  TRIMETHOPRIM/SULFAMETHOXAZOLE: SIGNIFICANT CHANGE UP
-  VANCOMYCIN: SIGNIFICANT CHANGE UP
-  VANCOMYCIN: SIGNIFICANT CHANGE UP
ALBUMIN SERPL ELPH-MCNC: 2.2 G/DL — LOW (ref 3.3–5)
ALP SERPL-CCNC: 260 U/L — HIGH (ref 40–120)
ALT FLD-CCNC: 34 U/L — SIGNIFICANT CHANGE UP (ref 12–78)
AMMONIA BLD-MCNC: 46 UMOL/L — HIGH (ref 11–32)
ANION GAP SERPL CALC-SCNC: 6 MMOL/L — SIGNIFICANT CHANGE UP (ref 5–17)
AST SERPL-CCNC: 267 U/L — HIGH (ref 15–37)
BASOPHILS # BLD AUTO: 0.02 K/UL — SIGNIFICANT CHANGE UP (ref 0–0.2)
BASOPHILS NFR BLD AUTO: 0.3 % — SIGNIFICANT CHANGE UP (ref 0–2)
BILIRUB DIRECT SERPL-MCNC: 12.6 MG/DL — HIGH (ref 0–0.3)
BILIRUB INDIRECT FLD-MCNC: 3.3 MG/DL — HIGH (ref 0.2–1)
BILIRUB SERPL-MCNC: 15.9 MG/DL — HIGH (ref 0.2–1.2)
BUN SERPL-MCNC: 8 MG/DL — SIGNIFICANT CHANGE UP (ref 7–23)
CALCIUM SERPL-MCNC: 8.1 MG/DL — LOW (ref 8.5–10.1)
CHLORIDE SERPL-SCNC: 97 MMOL/L — SIGNIFICANT CHANGE UP (ref 96–108)
CO2 SERPL-SCNC: 27 MMOL/L — SIGNIFICANT CHANGE UP (ref 22–31)
CREAT SERPL-MCNC: 0.45 MG/DL — LOW (ref 0.5–1.3)
CULTURE RESULTS: SIGNIFICANT CHANGE UP
EGFR: 128 ML/MIN/1.73M2 — SIGNIFICANT CHANGE UP
EOSINOPHIL # BLD AUTO: 0.06 K/UL — SIGNIFICANT CHANGE UP (ref 0–0.5)
EOSINOPHIL NFR BLD AUTO: 0.8 % — SIGNIFICANT CHANGE UP (ref 0–6)
GLUCOSE SERPL-MCNC: 107 MG/DL — HIGH (ref 70–99)
HCT VFR BLD CALC: 27.3 % — LOW (ref 39–50)
HGB BLD-MCNC: 8.9 G/DL — LOW (ref 13–17)
IGA FLD-MCNC: 366 MG/DL — SIGNIFICANT CHANGE UP (ref 84–499)
IGG FLD-MCNC: 1288 MG/DL — SIGNIFICANT CHANGE UP (ref 610–1660)
IGM SERPL-MCNC: 271 MG/DL — HIGH (ref 35–242)
IMM GRANULOCYTES NFR BLD AUTO: 0.5 % — SIGNIFICANT CHANGE UP (ref 0–0.9)
INR BLD: 2.02 RATIO — HIGH (ref 0.85–1.18)
KAPPA LC SER QL IFE: 3.57 MG/DL — HIGH (ref 0.33–1.94)
KAPPA/LAMBDA FREE LIGHT CHAIN RATIO, SERUM: 1.18 RATIO — SIGNIFICANT CHANGE UP (ref 0.26–1.65)
LAMBDA LC SER QL IFE: 3.02 MG/DL — HIGH (ref 0.57–2.63)
LYMPHOCYTES # BLD AUTO: 1.26 K/UL — SIGNIFICANT CHANGE UP (ref 1–3.3)
LYMPHOCYTES # BLD AUTO: 16 % — SIGNIFICANT CHANGE UP (ref 13–44)
MAGNESIUM SERPL-MCNC: 1.6 MG/DL — SIGNIFICANT CHANGE UP (ref 1.6–2.6)
MCHC RBC-ENTMCNC: 31.6 PG — SIGNIFICANT CHANGE UP (ref 27–34)
MCHC RBC-ENTMCNC: 32.6 GM/DL — SIGNIFICANT CHANGE UP (ref 32–36)
MCV RBC AUTO: 96.8 FL — SIGNIFICANT CHANGE UP (ref 80–100)
METHOD TYPE: SIGNIFICANT CHANGE UP
MONOCYTES # BLD AUTO: 0.74 K/UL — SIGNIFICANT CHANGE UP (ref 0–0.9)
MONOCYTES NFR BLD AUTO: 9.4 % — SIGNIFICANT CHANGE UP (ref 2–14)
NEUTROPHILS # BLD AUTO: 5.76 K/UL — SIGNIFICANT CHANGE UP (ref 1.8–7.4)
NEUTROPHILS NFR BLD AUTO: 73 % — SIGNIFICANT CHANGE UP (ref 43–77)
NT-PROBNP SERPL-SCNC: 334 PG/ML — HIGH (ref 0–125)
PHOSPHATE SERPL-MCNC: 1.8 MG/DL — LOW (ref 2.5–4.5)
PLATELET # BLD AUTO: 178 K/UL — SIGNIFICANT CHANGE UP (ref 150–400)
POTASSIUM SERPL-MCNC: 3.2 MMOL/L — LOW (ref 3.5–5.3)
POTASSIUM SERPL-SCNC: 3.2 MMOL/L — LOW (ref 3.5–5.3)
PROT SERPL-MCNC: 6.4 GM/DL — SIGNIFICANT CHANGE UP (ref 6–8.3)
PROTHROM AB SERPL-ACNC: 22.4 SEC — HIGH (ref 9.5–13)
RBC # BLD: 2.82 M/UL — LOW (ref 4.2–5.8)
RBC # FLD: 16.1 % — HIGH (ref 10.3–14.5)
SODIUM SERPL-SCNC: 130 MMOL/L — LOW (ref 135–145)
SPECIMEN SOURCE: SIGNIFICANT CHANGE UP
WBC # BLD: 7.88 K/UL — SIGNIFICANT CHANGE UP (ref 3.8–10.5)
WBC # FLD AUTO: 7.88 K/UL — SIGNIFICANT CHANGE UP (ref 3.8–10.5)

## 2024-09-02 PROCEDURE — 99232 SBSQ HOSP IP/OBS MODERATE 35: CPT

## 2024-09-02 RX ORDER — POTASSIUM CHLORIDE 10 MEQ
20 TABLET, EXT RELEASE, PARTICLES/CRYSTALS ORAL
Refills: 0 | Status: COMPLETED | OUTPATIENT
Start: 2024-09-02 | End: 2024-09-02

## 2024-09-02 RX ORDER — PHYTONADIONE (VIT K1) 1 MG/0.5ML
5 AMPUL (ML) INJECTION ONCE
Refills: 0 | Status: COMPLETED | OUTPATIENT
Start: 2024-09-02 | End: 2024-09-02

## 2024-09-02 RX ORDER — SODIUM PHOSPHATE, MONOBASIC, MONOHYDRATE AND SODIUM PHOSPHATE, DIBASIC ANHYDROUS 276; 142 MG/ML; MG/ML
15 INJECTION, SOLUTION INTRAVENOUS ONCE
Refills: 0 | Status: COMPLETED | OUTPATIENT
Start: 2024-09-02 | End: 2024-09-02

## 2024-09-02 RX ADMIN — Medication 20 MILLIEQUIVALENT(S): at 13:33

## 2024-09-02 RX ADMIN — SODIUM PHOSPHATE, MONOBASIC, MONOHYDRATE AND SODIUM PHOSPHATE, DIBASIC ANHYDROUS 62.5 MILLIMOLE(S): 276; 142 INJECTION, SOLUTION INTRAVENOUS at 10:22

## 2024-09-02 RX ADMIN — Medication 1 MILLIGRAM(S): at 10:21

## 2024-09-02 RX ADMIN — MAGNESIUM OXIDE TAB 400 MG (240 MG ELEMENTAL MG) 400 MILLIGRAM(S): 400 (240 MG) TAB at 17:45

## 2024-09-02 RX ADMIN — PIPERACILLIN SODIUM AND TAZOBACTAM SODIUM 25 GRAM(S): 3; .375 INJECTION, POWDER, FOR SOLUTION INTRAVENOUS at 15:48

## 2024-09-02 RX ADMIN — Medication 15 MILLIGRAM(S): at 00:59

## 2024-09-02 RX ADMIN — Medication 15 MILLIGRAM(S): at 01:45

## 2024-09-02 RX ADMIN — Medication 20 MILLIGRAM(S): at 15:48

## 2024-09-02 RX ADMIN — PIPERACILLIN SODIUM AND TAZOBACTAM SODIUM 25 GRAM(S): 3; .375 INJECTION, POWDER, FOR SOLUTION INTRAVENOUS at 06:17

## 2024-09-02 RX ADMIN — SULFAMETHOXAZOLE AND TRIMETHOPRIM 1 TABLET(S): 800; 160 TABLET ORAL at 21:31

## 2024-09-02 RX ADMIN — Medication 2 MILLIGRAM(S): at 10:35

## 2024-09-02 RX ADMIN — Medication 5 MILLIGRAM(S): at 19:53

## 2024-09-02 RX ADMIN — Medication 2 MILLIGRAM(S): at 16:11

## 2024-09-02 RX ADMIN — Medication 2 MILLIGRAM(S): at 10:20

## 2024-09-02 RX ADMIN — Medication 15 MILLIGRAM(S): at 13:34

## 2024-09-02 RX ADMIN — Medication 15 MILLIGRAM(S): at 06:18

## 2024-09-02 RX ADMIN — Medication 20 MILLIGRAM(S): at 05:12

## 2024-09-02 RX ADMIN — SULFAMETHOXAZOLE AND TRIMETHOPRIM 1 TABLET(S): 800; 160 TABLET ORAL at 10:21

## 2024-09-02 RX ADMIN — Medication 20 MILLIEQUIVALENT(S): at 15:47

## 2024-09-02 RX ADMIN — Medication 15 MILLIGRAM(S): at 14:34

## 2024-09-02 RX ADMIN — Medication 15 MILLIGRAM(S): at 07:00

## 2024-09-02 RX ADMIN — Medication 2000 UNIT(S): at 10:21

## 2024-09-02 RX ADMIN — Medication 2 MILLIGRAM(S): at 15:56

## 2024-09-02 RX ADMIN — Medication 15 MILLIGRAM(S): at 22:07

## 2024-09-02 RX ADMIN — MAGNESIUM OXIDE TAB 400 MG (240 MG ELEMENTAL MG) 400 MILLIGRAM(S): 400 (240 MG) TAB at 10:20

## 2024-09-02 RX ADMIN — ALBUMIN (HUMAN) 50 MILLILITER(S): 5 SOLUTION INTRAVENOUS at 13:34

## 2024-09-02 RX ADMIN — Medication 20 MILLIEQUIVALENT(S): at 10:22

## 2024-09-02 RX ADMIN — ALBUMIN (HUMAN) 50 MILLILITER(S): 5 SOLUTION INTRAVENOUS at 03:04

## 2024-09-02 RX ADMIN — Medication 100 GRAM(S): at 17:44

## 2024-09-02 RX ADMIN — ZOLPIDEM TARTRATE 5 MILLIGRAM(S): 5 TABLET, FILM COATED ORAL at 22:53

## 2024-09-02 RX ADMIN — URSODIOL 300 MILLIGRAM(S): 500 TABLET, FILM COATED ORAL at 10:21

## 2024-09-02 RX ADMIN — PIPERACILLIN SODIUM AND TAZOBACTAM SODIUM 25 GRAM(S): 3; .375 INJECTION, POWDER, FOR SOLUTION INTRAVENOUS at 21:30

## 2024-09-02 RX ADMIN — URSODIOL 300 MILLIGRAM(S): 500 TABLET, FILM COATED ORAL at 21:31

## 2024-09-02 RX ADMIN — Medication 15 MILLIGRAM(S): at 21:37

## 2024-09-02 NOTE — PROGRESS NOTE ADULT - SUBJECTIVE AND OBJECTIVE BOX
Patient is a 49y old  Male who presents with a chief complaint of Fever (02 Sep 2024 11:25)      Subective:  No complaints - no fever    PAST MEDICAL & SURGICAL HISTORY:  Colon cancer      Hypertension      Metastasis to liver      Metastasis to lung      Obstructive jaundice      H/O sepsis      H/O bacteremia      History of cholangitis      History of biliary stent insertion      S/P hernia surgery      History of ablation of neoplasm of liver      S/P colon resection          MEDICATIONS  (STANDING):  albumin human 25% IVPB 100 milliLiter(s) IV Intermittent every 12 hours  cholecalciferol 2000 Unit(s) Oral daily  folic acid 1 milliGRAM(s) Oral daily  furosemide   Injectable 20 milliGRAM(s) IV Push every 12 hours  magnesium oxide 400 milliGRAM(s) Oral two times a day with meals  piperacillin/tazobactam IVPB.. 3.375 Gram(s) IV Intermittent every 8 hours  potassium chloride    Tablet ER 20 milliEquivalent(s) Oral every 2 hours  rifAXIMin 550 milliGRAM(s) Oral two times a day  trimethoprim  160 mG/sulfamethoxazole 800 mG 1 Tablet(s) Oral every 12 hours  ursodiol Capsule 300 milliGRAM(s) Oral every 12 hours    MEDICATIONS  (PRN):  acetaminophen     Tablet .. 650 milliGRAM(s) Oral every 6 hours PRN Temp greater or equal to 38C (100.4F), Mild Pain (1 - 3)  aluminum hydroxide/magnesium hydroxide/simethicone Suspension 30 milliLiter(s) Oral every 4 hours PRN Dyspepsia  benzonatate 100 milliGRAM(s) Oral every 8 hours PRN for cough  bisacodyl 5 milliGRAM(s) Oral daily PRN Constipation  morphine  - Injectable 2 milliGRAM(s) IV Push every 4 hours PRN Severe Pain (7 - 10)  morphine  IR 15 milliGRAM(s) Oral every 6 hours PRN Severe Pain (7 - 10)  ondansetron Injectable 4 milliGRAM(s) IV Push every 8 hours PRN Nausea and/or Vomiting  polyethylene glycol 3350 17 Gram(s) Oral daily PRN for constipation  zolpidem 5 milliGRAM(s) Oral at bedtime PRN Insomnia      REVIEW OF SYSTEMS:    RESPIRATORY: No shortness of breath  CARDIOVASCULAR: No chest pain  All other review of systems is negative unless indicated above.    Vital Signs Last 24 Hrs  T(C): 37.1 (02 Sep 2024 09:18), Max: 37.1 (01 Sep 2024 21:04)  T(F): 98.8 (02 Sep 2024 09:18), Max: 98.8 (02 Sep 2024 09:18)  HR: 114 (02 Sep 2024 09:18) (102 - 114)  BP: 128/73 (02 Sep 2024 09:18) (108/72 - 128/73)  BP(mean): --  RR: 18 (02 Sep 2024 09:18) (18 - 18)  SpO2: 96% (02 Sep 2024 09:18) (95% - 98%)    Parameters below as of 02 Sep 2024 09:18  Patient On (Oxygen Delivery Method): room air        PHYSICAL EXAM:    Constitutional: NAD, jaundice  Respiratory: CTAB  Cardiovascular: S1 and S2, RRR  Gastrointestinal: BS+, soft, NT/ND  Extremities: No peripheral edema  Psychiatric: Normal mood, normal affect    LABS:                        8.9    7.88  )-----------( 178      ( 02 Sep 2024 06:00 )             27.3     09-02    130<L>  |  97  |  8   ----------------------------<  107<H>  3.2<L>   |  27  |  0.45<L>    Ca    8.1<L>      02 Sep 2024 06:00  Phos  1.8     09-02  Mg     1.6     09-02    TPro  6.4  /  Alb  2.2<L>  /  TBili  15.9<H>  /  DBili  12.6<H>  /  AST  267<H>  /  ALT  34  /  AlkPhos  260<H>  09-02    PT/INR - ( 02 Sep 2024 06:00 )   PT: 22.4 sec;   INR: 2.02 ratio           LIVER FUNCTIONS - ( 02 Sep 2024 06:00 )  Alb: 2.2 g/dL / Pro: 6.4 gm/dL / ALK PHOS: 260 U/L / ALT: 34 U/L / AST: 267 U/L / GGT: x             RADIOLOGY & ADDITIONAL STUDIES:

## 2024-09-02 NOTE — PROGRESS NOTE ADULT - ASSESSMENT
Imp:  Case reviewed with Dr. Nicolas  He does not feel that there is any role for additional ERCP  Complete course of Abx per ID

## 2024-09-02 NOTE — PROGRESS NOTE ADULT - SUBJECTIVE AND OBJECTIVE BOX
INTERVAL HPI/OVERNIGHT EVENTS:  Patient S&E at bedside.   he denies any pain  pt's wife spoke to Dr Blood 8/30.  No plans for additional chemotherapy   may consider palliative care   bili 14.9 today   jaundiced +++  no acute complaints     PAST MEDICAL & SURGICAL HISTORY:  Colon cancer      Hypertension      Metastasis to liver      Metastasis to lung      Obstructive jaundice      H/O sepsis      H/O bacteremia      History of cholangitis      History of biliary stent insertion      S/P hernia surgery      History of ablation of neoplasm of liver      S/P colon resection          FAMILY HISTORY:  FH: heart attack (Father)      Vital Signs Last 24 Hrs  T(C): 37.1 (09-02-24 @ 09:18), Max: 37.1 (09-01-24 @ 21:04)  T(F): 98.8 (09-02-24 @ 09:18), Max: 98.8 (09-02-24 @ 09:18)  HR: 114 (09-02-24 @ 09:18) (102 - 114)  BP: 128/73 (09-02-24 @ 09:18) (108/72 - 128/73)  BP(mean): --  RR: 18 (09-02-24 @ 09:18) (18 - 18)  SpO2: 96% (09-02-24 @ 09:18) (95% - 98%)            PHYSICAL EXAM:    Constitutional: jaundiced   Eyes: EOMI, sclera icteric  Respiratory: CTA b/l,   Cardiovascular: RRR,   Gastrointestinal: soft, drain in place  Neurological: AAOx3    MEDICATIONS  (STANDING):  cholecalciferol 2000 Unit(s) Oral daily  folic acid 1 milliGRAM(s) Oral daily  furosemide    Tablet 20 milliGRAM(s) Oral daily  magnesium oxide 400 milliGRAM(s) Oral two times a day with meals  phytonadione   Solution 2.5 milliGRAM(s) Oral once  piperacillin/tazobactam IVPB.. 3.375 Gram(s) IV Intermittent every 8 hours  rifAXIMin 550 milliGRAM(s) Oral two times a day  sodium phosphate 15 milliMole(s)/250 mL IVPB 15 milliMole(s) IV Intermittent once  ursodiol Capsule 300 milliGRAM(s) Oral every 12 hours    MEDICATIONS  (PRN):  acetaminophen     Tablet .. 650 milliGRAM(s) Oral every 6 hours PRN Temp greater or equal to 38C (100.4F), Mild Pain (1 - 3)  aluminum hydroxide/magnesium hydroxide/simethicone Suspension 30 milliLiter(s) Oral every 4 hours PRN Dyspepsia  benzonatate 100 milliGRAM(s) Oral every 8 hours PRN for cough  bisacodyl 5 milliGRAM(s) Oral daily PRN Constipation  melatonin 3 milliGRAM(s) Oral at bedtime PRN Insomnia  morphine  - Injectable 2 milliGRAM(s) IV Push every 4 hours PRN Severe Pain (7 - 10)  morphine  IR 15 milliGRAM(s) Oral every 6 hours PRN Severe Pain (7 - 10)  ondansetron Injectable 4 milliGRAM(s) IV Push every 8 hours PRN Nausea and/or Vomiting  polyethylene glycol 3350 17 Gram(s) Oral daily PRN for constipation  traMADol 50 milliGRAM(s) Oral every 6 hours PRN Moderate Pain (4 - 6)        Allergies    No Known Allergies    Intolerances        LABS:                          8.9    7.88  )-----------( 178      ( 02 Sep 2024 06:00 )             27.3                             9.2    6.98  )-----------( 176      ( 01 Sep 2024 06:48 )             28.3                            8.8    6.48  )-----------( 169      ( 31 Aug 2024 06:34 )             26.5                        9.3    6.66  )-----------( 176      ( 30 Aug 2024 07:39 )             27.8     08-30    133<L>  |  101  |  10  ----------------------------<  95  3.6   |  26  |  0.43<L>    Ca    8.3<L>      30 Aug 2024 07:39    TPro  6.0  /  Alb  1.6<L>  /  TBili  15.3<H>  /  DBili  x   /  AST  239<H>  /  ALT  32  /  AlkPhos  239<H>  08-30    PT/INR - ( 28 Aug 2024 21:48 )   PT: 25.7 sec;   INR: 2.33 ratio         PTT - ( 28 Aug 2024 21:48 )  PTT:34.8 sec  Urinalysis Basic - ( 30 Aug 2024 07:39 )    Color: x / Appearance: x / SG: x / pH: x  Gluc: 95 mg/dL / Ketone: x  / Bili: x / Urobili: x   Blood: x / Protein: x / Nitrite: x   Leuk Esterase: x / RBC: x / WBC x   Sq Epi: x / Non Sq Epi: x / Bacteria: x        RADIOLOGY & ADDITIONAL TESTS:  Studies reviewed.   INTERVAL HPI/OVERNIGHT EVENTS:  Patient S&E at bedside.   he denies any pain  pt's wife spoke to Dr Blood 8/30.  No plans for additional chemotherapy   may consider palliative care   bili 15.9 today   jaundiced +++  no acute complaints     PAST MEDICAL & SURGICAL HISTORY:  Colon cancer      Hypertension      Metastasis to liver      Metastasis to lung      Obstructive jaundice      H/O sepsis      H/O bacteremia      History of cholangitis      History of biliary stent insertion      S/P hernia surgery      History of ablation of neoplasm of liver      S/P colon resection          FAMILY HISTORY:  FH: heart attack (Father)      Vital Signs Last 24 Hrs  T(C): 37.1 (09-02-24 @ 09:18), Max: 37.1 (09-01-24 @ 21:04)  T(F): 98.8 (09-02-24 @ 09:18), Max: 98.8 (09-02-24 @ 09:18)  HR: 114 (09-02-24 @ 09:18) (102 - 114)  BP: 128/73 (09-02-24 @ 09:18) (108/72 - 128/73)  BP(mean): --  RR: 18 (09-02-24 @ 09:18) (18 - 18)  SpO2: 96% (09-02-24 @ 09:18) (95% - 98%)            PHYSICAL EXAM:    Constitutional: jaundiced   Eyes: EOMI, sclera icteric  Respiratory: CTA b/l,   Cardiovascular: RRR,   Gastrointestinal: soft, drain in place  Neurological: AAOx3    MEDICATIONS  (STANDING):  cholecalciferol 2000 Unit(s) Oral daily  folic acid 1 milliGRAM(s) Oral daily  furosemide    Tablet 20 milliGRAM(s) Oral daily  magnesium oxide 400 milliGRAM(s) Oral two times a day with meals  phytonadione   Solution 2.5 milliGRAM(s) Oral once  piperacillin/tazobactam IVPB.. 3.375 Gram(s) IV Intermittent every 8 hours  rifAXIMin 550 milliGRAM(s) Oral two times a day  sodium phosphate 15 milliMole(s)/250 mL IVPB 15 milliMole(s) IV Intermittent once  ursodiol Capsule 300 milliGRAM(s) Oral every 12 hours    MEDICATIONS  (PRN):  acetaminophen     Tablet .. 650 milliGRAM(s) Oral every 6 hours PRN Temp greater or equal to 38C (100.4F), Mild Pain (1 - 3)  aluminum hydroxide/magnesium hydroxide/simethicone Suspension 30 milliLiter(s) Oral every 4 hours PRN Dyspepsia  benzonatate 100 milliGRAM(s) Oral every 8 hours PRN for cough  bisacodyl 5 milliGRAM(s) Oral daily PRN Constipation  melatonin 3 milliGRAM(s) Oral at bedtime PRN Insomnia  morphine  - Injectable 2 milliGRAM(s) IV Push every 4 hours PRN Severe Pain (7 - 10)  morphine  IR 15 milliGRAM(s) Oral every 6 hours PRN Severe Pain (7 - 10)  ondansetron Injectable 4 milliGRAM(s) IV Push every 8 hours PRN Nausea and/or Vomiting  polyethylene glycol 3350 17 Gram(s) Oral daily PRN for constipation  traMADol 50 milliGRAM(s) Oral every 6 hours PRN Moderate Pain (4 - 6)        Allergies    No Known Allergies    Intolerances        LABS:                          8.9    7.88  )-----------( 178      ( 02 Sep 2024 06:00 )             27.3                             9.2    6.98  )-----------( 176      ( 01 Sep 2024 06:48 )             28.3                            8.8    6.48  )-----------( 169      ( 31 Aug 2024 06:34 )             26.5                        9.3    6.66  )-----------( 176      ( 30 Aug 2024 07:39 )             27.8     08-30    133<L>  |  101  |  10  ----------------------------<  95  3.6   |  26  |  0.43<L>    Ca    8.3<L>      30 Aug 2024 07:39    TPro  6.0  /  Alb  1.6<L>  /  TBili  15.3<H>  /  DBili  x   /  AST  239<H>  /  ALT  32  /  AlkPhos  239<H>  08-30    PT/INR - ( 28 Aug 2024 21:48 )   PT: 25.7 sec;   INR: 2.33 ratio         PTT - ( 28 Aug 2024 21:48 )  PTT:34.8 sec  Urinalysis Basic - ( 30 Aug 2024 07:39 )    Color: x / Appearance: x / SG: x / pH: x  Gluc: 95 mg/dL / Ketone: x  / Bili: x / Urobili: x   Blood: x / Protein: x / Nitrite: x   Leuk Esterase: x / RBC: x / WBC x   Sq Epi: x / Non Sq Epi: x / Bacteria: x        RADIOLOGY & ADDITIONAL TESTS:  Studies reviewed.

## 2024-09-02 NOTE — PROGRESS NOTE ADULT - SUBJECTIVE AND OBJECTIVE BOX
Subjective:  Chief complain :  Fever      HPI:  48 y/o M with PMH metastatic colon CA with liver and lung metastases on panitumumab (last dose 7/10/24) who follows at [facility], h/o  Hepatitis C, s/p Rx ,  HTN, obstructive jaundice s/p biliary drain, sepsis, bacteremia, UTI, cholangitis, biloma s/p IR drain placement presents with fever. Pt states that he had a Tmax of 101.8F at home. He spoke to Dr. Michaels who advised him to come to the hosptial. He reports nausea and decreased drainage of right biliary drain (about 15 ml/day) when compared to left biliary drain (about 150-200 ml/day). Denies chills, chest pain, URI symptoms, cough, shortness of breath, chest pain, abdominal pain, vomiting, diarrhea, constipation, burning on urination. Please refer to recent admission below, pt was discharged on Levaquin and Flagyl which he completed yesterday. Pt was supposed to start chemotherapy and immunotherapy for his cancer 3 weeks ago but has been hospitalized.    Prior admission:   - 24: sepsis secondary to klebsiella oxytoca bacteremia, UTI with citrobacter, pseudomonas bacteremia, cholangitis d/t malignant biliary obstruction, biloma s/p IR drain placement, progression of metastatic disease -> discharged on Levaquin and Flagyl     ER course: HR . Labs: Hb 10.3 (baseline ~9), neutrophils 82.7%, INR 2.33, lactate 2.7 -> 1.8, glucose 124, albumin 2.0, total bilirubin 19.1, , . UA: unable to be performed due to color interference. RVP negative. EKG: Sinus tachycardia with  bpm, incomplete RBBB,  ms, no ST changes (personally reviewed).     Imaging:   - CT abd/pelvis: 1. Mild interval increase in size of the patient's large mass in the right  lobe of the liver. Additional lesions are similar to the prior examination. 2. Extensive retroperitoneal and periportal adenopathy, as previously. 3. Peritoneal carcinomatosis. 4. Metastases at the lung bases. 5. Mild-to-moderate ascites. 6. Cirrhosis.  - CXR: RLL infiltrate, small bilateral pleural effusions, no pneumothorax (personally reviewed).     Pt was given Meropenem, Zosyn, Vancomycin, 3100 ml of NS, Dilaudid, Oxycodone, IV tylenol. He was admitted to med/surg for further management.          -  Patient seen and examined at bedside earlier today, afebrile, denies cp, dyspnea, abdominal pain only at night, tolerating some po intake, + bad taste, plan discussed  - + epigastric abdominal pain, denies nausea, vomiting , poor po intake, + abdominal distension, plan discussed   - + epigastric pain controlled , denies nausea, cp, dyspnea, cough, + abdominal distension, + swelling hands, tolerating some po intake  - pain controlled, abdominal distension better, denies cp, dyspnea, afebrile, tolerating more po intake, plan discussed    Review of system- Rest of the review of system are negative except mentioned in HPI    Vital sings reviewed for last 24 h  T(C): 37.2 (24 @ 15:50), Max: 37.2 (24 @ 15:50)  T(F): 99 (24 @ 15:50), Max: 99 (24 @ 15:50)  HR: 108 (24 @ 15:50) (102 - 114)  BP: 120/77 (24 @ 15:50) (112/68 - 128/73)  RR: 18 (24 @ 15:50) (18 - 18)  SpO2: 98% (24 @ 15:50) (95% - 98%)  Wt(kg): --  Daily     Daily Weight in k.2 (02 Sep 2024 06:19)  CAPILLARY BLOOD GLUCOSE            Physical exam:   General : NAD, appear to be of stated age , well groomed   NERVOUS SYSTEM:  Alert & Oriented X3, non- focal exam, Motor Strength 5/5 B/L upper and lower extremities; DTRs 2+ intact and symmetric  HEAD:  Atraumatic, Normocephalic  EYES: EOMI, PERRLA, conjunctiva and sclera clear  HEENT: Moist mucous membranes, Supple neck , No JVD  CHEST: BS decreased at bases bilaterally; No rales, no rhonchi, no wheezing  HEART: Regular rate and rhythm; No murmurs, no rubs or gallops  ABDOMEN: Soft, Non-tender, + distended; + ascites ,  Bowel sounds present, no guarding , no peritoneal irritation   GENITOURINARY- Voiding, no suprapubic tenderness  EXTREMITIES:  2+ Peripheral Pulses, No clubbing, cyanosis,   edema  MUSCULOSKELETAL:- No muscle tenderness, Muscle tone normal, No joint tenderness, no Joint swelling,  Joint ROM –normal   SKIN-no rash, no lesion    Labs radiologic and other test : all reviewed and interpreted :                           8.9    7.88  )-----------( 178      ( 02 Sep 2024 06:00 )             27.3     09-    130<L>  |  97  |  8   ----------------------------<  107<H>  3.2<L>   |  27  |  0.45<L>    Ca    8.1<L>      02 Sep 2024 06:00  Phos  1.8       Mg     1.6         TPro  6.4  /  Alb  2.2<L>  /  TBili  15.9<H>  /  DBili  12.6<H>  /  AST  267<H>  /  ALT  34  /  AlkPhos  260<H>          LIVER FUNCTIONS - ( 02 Sep 2024 06:00 )  Alb: 2.2 g/dL / Pro: 6.4 gm/dL / ALK PHOS: 260 U/L / ALT: 34 U/L / AST: 267 U/L / GGT: x                                  < from: Xray Chest 1 View-PORTABLE IMMEDIATE (24 @ 22:09) >    IMPRESSION: Right superior mediastinal mass somewhat increased. Pulmonary   metastases again noted. Small infiltrate off right hilum is presently   suggested.         CT Abdomen and Pelvis w/ IV Cont (24 @ 23:51) >  IMPRESSION:  1.   Metastatic disease with interval tendency to increase in size of the   hepatic metastases, some pulmonary metastases and abdominal   lymphadenopathy.    2.  Mild biliary duct dilatation in the right hepatic lobe despite   central CBD stent extending to the right biliary ductal system, no   pneumobilia. Stent patency is to be questioned. Stable left biliary   ductal percutaneous stent.    3. Right subhepatic percutaneous catheter without residual localized   collection but surrounding ascites. Correlate with drain output.    4. Peritoneal carcinomatosis        RECENT CULTURES:  Culture - Blood (24 @ 21:48)    -  Blood PCR Panel: NEG   -  Trimethoprim/Sulfamethoxazole: R >2/38   -  Tobramycin: I 8   -  Piperacillin/Tazobactam: S <=8   Gram Stain:   Growth in aerobic bottle: Gram Negative Rods   -  Amikacin: I 32   -  Aztreonam: R >16   -  Cefepime: R >16   -  Meropenem: S 2   -  Levofloxacin: R >4   -  Gentamicin: I 8   -  Ceftriaxone: I 32   -  Ciprofloxacin: R >2   Specimen Source: .Blood None   Organism: Blood Culture PCR   Organism: Achromobacter xylosoxidans   Culture Results:   Growth in aerobic bottle: Achromobacter xylosoxidans  Direct identification is available within approximately 3-5  hours either by Blood Panel Multiplexed PCR or Direct  MALDI-TOF. Details: [email or web address]   Organism Identification: Blood Culture PCR  Achromobacter xylosoxidans   Method Type: PCR   Method Type: AIDAN    Culture - Blood (24 @ 12:17)    Specimen Source: .Blood None   Culture Results:   No growth at 48 Hours    Culture - Body Fluid with Gram Stain (24 @ 07:50)    -  Oxacillin: R >2   -  Oxacillin: R >2   -  Penicillin: R 2   -  Penicillin: R >8   -  Rifampin: S <=1 Should not be used as monotherapy   -  Rifampin: R >2 Should not be used as monotherapy   -  Tetracycline: S <=1   -  Tetracycline: R >8   -  Vancomycin: S 1   -  Vancomycin: S 1   -  Trimethoprim/Sulfamethoxazole: S <=0.5/9.5   -  Trimethoprim/Sulfamethoxazole: R >2/38   Gram Stain:   No polymorphonuclear cells seen per low power field  No organisms seen per oil power field   -  Clindamycin: R >4   -  Clindamycin: R <=0.25 This isolate is presumed to be clindamycin resistant based on detection of inducible resistance. Clindamycin may still be effective in some patients.   -  Erythromycin: R >4   -  Erythromycin: R >4   -  Gentamicin: S <=1 Should not be used as monotherapy   -  Gentamicin: S <=1 Should not be used as monotherapy   Specimen Source: Body Fluid RIGHT BILIARY DRAIN   Culture Results:   Rare Staphylococcus epidermidis  Rare Staphylococcus haemolyticus   Organism Identification: Staphylococcus haemolyticus  Staphylococcus epidermidis   Organism: Staphylococcus epidermidis   Organism: Staphylococcus haemolyticus   Method Type: AIDAN   Method Type: AIDAN    Culture - Body Fluid with Gram Stain (24 @ 07:43)    -  Levofloxacin: R >4   Gram Stain:   No polymorphonuclear leukocytes seen per low power field  Moderate Gram Negative Rods seen per oil power field   -  Minocycline: R   -  Trimethoprim/Sulfamethoxazole: S    Specimen Source: Body Fluid LEFT BILIARY DRAIN   Culture Results:   Numerous Stenotrophomonas maltophilia   Organism Identification: Stenotrophomonas maltophilia  Stenotrophomonas maltophilia   Organism: Stenotrophomonas maltophilia   Organism: Stenotrophomonas maltophilia   Method Type: AIDAN   Method Type: KB          Cardiac testing : reviewed   EKG      12 Lead ECG (24 @ 21:31) >  Ventricular Rate 128 BPM  QTC Calculation(Bazett) 481 ms  Sinus tachycardia  Incomplete right bundle branch block  ST & T wave abnormality, consider anterior ischemia  Abnormal ECG    Procedures :     Devices:     MEDICATIONS  (STANDING):  albumin human 25% IVPB 100 milliLiter(s) IV Intermittent every 12 hours  cholecalciferol 2000 Unit(s) Oral daily  folic acid 1 milliGRAM(s) Oral daily  furosemide   Injectable 20 milliGRAM(s) IV Push every 12 hours  magnesium oxide 400 milliGRAM(s) Oral two times a day with meals  piperacillin/tazobactam IVPB.. 3.375 Gram(s) IV Intermittent every 8 hours  rifAXIMin 550 milliGRAM(s) Oral two times a day  trimethoprim  160 mG/sulfamethoxazole 800 mG 1 Tablet(s) Oral every 12 hours  ursodiol Capsule 300 milliGRAM(s) Oral every 12 hours    MEDICATIONS  (PRN):  acetaminophen     Tablet .. 650 milliGRAM(s) Oral every 6 hours PRN Temp greater or equal to 38C (100.4F), Mild Pain (1 - 3)  aluminum hydroxide/magnesium hydroxide/simethicone Suspension 30 milliLiter(s) Oral every 4 hours PRN Dyspepsia  benzonatate 100 milliGRAM(s) Oral every 8 hours PRN for cough  bisacodyl 5 milliGRAM(s) Oral daily PRN Constipation  morphine  - Injectable 2 milliGRAM(s) IV Push every 4 hours PRN Severe Pain (7 - 10)  morphine  IR 15 milliGRAM(s) Oral every 6 hours PRN Severe Pain (7 - 10)  ondansetron Injectable 4 milliGRAM(s) IV Push every 8 hours PRN Nausea and/or Vomiting  polyethylene glycol 3350 17 Gram(s) Oral daily PRN for constipation  zolpidem 5 milliGRAM(s) Oral at bedtime PRN Insomnia

## 2024-09-02 NOTE — PROGRESS NOTE ADULT - ASSESSMENT
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 Mcnair   - IR consult - right bilary drain removed  - biliary fluid 1 of 2 cultures positive for stenotrophomonas maltophilia , staph epidermitis - will d/w ID team  - repeat blood cultures 8/30 - neg  - 9/1 - added bactrim DS    # Right biliary drain with decreased drainage   - Drains exchanged 8/22 per IR   - SCDs for now if procedure needed   - Pt had breakfast already this morning -> will need to be NPO if intervention planned   - IR consult - Dr. Mascorro   - right drain removed 8/30/24     # Hyperbilirubinemia, transaminitis, obstructive jaundice secondary to increase in size of large mass in the right lobe of the liver and progression of metastatic  colon cancer , Lung metastasis   # h/o  Hepatitis C  s/p Rx  - Trend LFTs and bilirubin   - on panitumumab (last dose 7/10/24)  - Heme/Onc consult - Dr. Arguello   -second opinion [facility] oncology consult requested Dr. Gonzalez - WIll need to obtain ERIK WT status, MMR status, BRAF status, and TMB (tumor mutational burden status), TRK fusion status, etc from previous pathology reports If tumor mutational burden level  elevated we can determine if he would be an immunotherapy candidate, this can  be done in the outpatient setting based on previous lines of therapy can consider treatment with irinotecan + mikaela, or mikaela plus chemo doublet, cap/mikaela, trifluridine-tipiracil, regorafenib, fruquitinib etc but will discuss more when all records available and pt follows in our office  - Bili 19--> 15--> 13--> 14.9--> 15  - check AFP - h/o hepatitis C   - IR consult - ? candidate for large liver  mass Y-90 radiation   - immunoglobulin panel - pending , if IGG < 500 will benefit from IGG     # Moderate   ascites , Hypervolumic hyponatremia  - May need paracentesis, will f/u with IR and GI   - GI consult - Dr. Quintero   - start IV albumin with IV lasix  - monitor Na, daily weight  - 8/15 - s/p 150 cc paracentesis , cytology neg for malignant cells  - repeat Us abd - pending    #  Hypoalbuminemia   - Albumin 2.0 --> 2.2   - c/w IV albumin  - Nutrition consult  - add supplements    # Coagulopathy  - INR > 2--> 1.9--> 2.0   - 8/31 - vit K 2.5 mg x1 dose  - 9/2 - vit K 5 mg     #  Abdominal pain  - stop dialudid - hepatic excretion   - trial of morphine PO prn with IV morphine prn    # Insomnia  - ambien    DVT ppx: SCDs for now -> advance to pharmacological if no planned procedures   Code status: Full code   Emergency contact: Pt will update his wife    Dispo - IV abx, IV albumin, IV lasix, ID team follows ,  oncology [facility] team, IR consult for possible paracentesis and ? candidate for  Y-90 radiation of liver mass

## 2024-09-02 NOTE — PROGRESS NOTE ADULT - ASSESSMENT
49-year-old male with a significant history of asthma and colon cancer, initially diagnosed in 2019. He underwent neoadjuvant chemotherapy followed by surgical resection and adjuvant treatment. In 2022, he was found to have progressive disease with metastasis to the liver and lung, confirmed via biomarkers.    # metastatic colon ca  The patient has undergone multiple lines of therapy and is currently on panitumumab under the care of Dr. Blood.   He has had several hospitalizations and has experienced recurrent biliomas, which have been drained multiple times.   Recently, he has been unable to complete systemic therapy due to complications.  CT a/p with increased in hepatic mets and pulm mets w/ abd LAD, mild biliary duct dilatation in right hepatic lobe despite central cbd stent and peritoneal carcinomatosis   bili 14.9 today   IR consulted for evaluation of drain - right drain removed 8/30  pt's wife d/w Dr Blood 8/30.  no further plans for anticancer therapy from Onc perspective   palliative care following.  GOC conversations ongoing  currently full code.      # GNR bacteremia   The patient presented with decreased drainage from his catheters and low-grade fevers. His most recent blood cultures were positive for Klebsiella oxytoca, and he was recommended to continue a 2-week course of antibiotics.   He reported high fevers, approximately 38.8°C (101.84°F), and decreased output from his drainage catheters.  biloma drain removed 8/30  Continue the current antibiotic regimen for Klebsiella oxytoca as previously recommended.- ID following - now on ZOSYN   BCx 8/30 - negative   Fluid cultures -  stenotrophomonas maltophilia; ID following   pt afebrile       will follow daily     will update GERALDINE Nicole MD  [facility]  [phone]       49-year-old male with a significant history of asthma and colon cancer, initially diagnosed in 2019. He underwent neoadjuvant chemotherapy followed by surgical resection and adjuvant treatment. In 2022, he was found to have progressive disease with metastasis to the liver and lung, confirmed via biomarkers.    # metastatic colon ca  The patient has undergone multiple lines of therapy and is currently on panitumumab under the care of Dr. Blood.   He has had several hospitalizations and has experienced recurrent biliomas, which have been drained multiple times.   Recently, he has been unable to complete systemic therapy due to complications.  CT a/p with increased in hepatic mets and pulm mets w/ abd LAD, mild biliary duct dilatation in right hepatic lobe despite central cbd stent and peritoneal carcinomatosis   bili 14.9 today   IR consulted for evaluation of drain - right drain removed 8/30  pt's wife d/w Dr Blood 8/30.  no further plans for anticancer therapy from Onc perspective   palliative care following.  GOC conversations ongoing  currently full code.      # GNR bacteremia   The patient presented with decreased drainage from his catheters and low-grade fevers. His most recent blood cultures were positive for Klebsiella oxytoca, and he was recommended to continue a 2-week course of antibiotics.   He reported high fevers, approximately 38.8°C (101.84°F), and decreased output from his drainage catheters.  biloma drain removed 8/30  Continue the current antibiotic regimen for Klebsiella oxytoca as previously recommended.- ID following - now on ZOSYN   BCx 8/30 - negative   Fluid cultures -  stenotrophomonas maltophilia; ID following   pt afebrile   T bili 15.9  GI eval noted.  no role for ERCP       will follow daily     will update GERALDINE Nicole MD  [facility]  [phone]

## 2024-09-03 ENCOUNTER — RESULT REVIEW (OUTPATIENT)
Age: 49
End: 2024-09-03

## 2024-09-03 LAB
AFP-TM SERPL-MCNC: <1.8 NG/ML — SIGNIFICANT CHANGE UP
ALBUMIN FLD-MCNC: 1.1 G/DL — SIGNIFICANT CHANGE UP
ALBUMIN SERPL ELPH-MCNC: 2.4 G/DL — LOW (ref 3.3–5)
ALP SERPL-CCNC: 214 U/L — HIGH (ref 40–120)
ALT FLD-CCNC: 35 U/L — SIGNIFICANT CHANGE UP (ref 12–78)
AMMONIA BLD-MCNC: 44 UMOL/L — HIGH (ref 11–32)
ANION GAP SERPL CALC-SCNC: 7 MMOL/L — SIGNIFICANT CHANGE UP (ref 5–17)
AST SERPL-CCNC: 275 U/L — HIGH (ref 15–37)
BILIRUB DIRECT SERPL-MCNC: 14.5 MG/DL — HIGH (ref 0–0.3)
BILIRUB INDIRECT FLD-MCNC: 4.3 MG/DL — HIGH (ref 0.2–1)
BILIRUB SERPL-MCNC: 18.8 MG/DL — HIGH (ref 0.2–1.2)
BUN SERPL-MCNC: 9 MG/DL — SIGNIFICANT CHANGE UP (ref 7–23)
CALCIUM SERPL-MCNC: 8.4 MG/DL — LOW (ref 8.5–10.1)
CHLORIDE SERPL-SCNC: 95 MMOL/L — LOW (ref 96–108)
CO2 SERPL-SCNC: 27 MMOL/L — SIGNIFICANT CHANGE UP (ref 22–31)
CREAT SERPL-MCNC: 0.5 MG/DL — SIGNIFICANT CHANGE UP (ref 0.5–1.3)
CRP SERPL-MCNC: 86 MG/L — HIGH
EGFR: 124 ML/MIN/1.73M2 — SIGNIFICANT CHANGE UP
GLUCOSE FLD-MCNC: 132 MG/DL — SIGNIFICANT CHANGE UP
GLUCOSE SERPL-MCNC: 116 MG/DL — HIGH (ref 70–99)
GRAM STN FLD: SIGNIFICANT CHANGE UP
HCT VFR BLD CALC: 26.6 % — LOW (ref 39–50)
HGB BLD-MCNC: 8.9 G/DL — LOW (ref 13–17)
INR BLD: 1.98 RATIO — HIGH (ref 0.85–1.18)
LDH SERPL L TO P-CCNC: 141 U/L — SIGNIFICANT CHANGE UP
MAGNESIUM SERPL-MCNC: 1.7 MG/DL — SIGNIFICANT CHANGE UP (ref 1.6–2.6)
MCHC RBC-ENTMCNC: 32.4 PG — SIGNIFICANT CHANGE UP (ref 27–34)
MCHC RBC-ENTMCNC: 33.5 GM/DL — SIGNIFICANT CHANGE UP (ref 32–36)
MCV RBC AUTO: 96.7 FL — SIGNIFICANT CHANGE UP (ref 80–100)
NT-PROBNP SERPL-SCNC: 454 PG/ML — HIGH (ref 0–125)
OSMOLALITY SERPL: 275 MOSMOL/KG — SIGNIFICANT CHANGE UP (ref 275–300)
PHOSPHATE SERPL-MCNC: 1.5 MG/DL — LOW (ref 2.5–4.5)
PLATELET # BLD AUTO: 176 K/UL — SIGNIFICANT CHANGE UP (ref 150–400)
POTASSIUM SERPL-MCNC: 3.5 MMOL/L — SIGNIFICANT CHANGE UP (ref 3.5–5.3)
POTASSIUM SERPL-SCNC: 3.5 MMOL/L — SIGNIFICANT CHANGE UP (ref 3.5–5.3)
PROT FLD-MCNC: 2 G/DL — SIGNIFICANT CHANGE UP
PROT SERPL-MCNC: 6.4 GM/DL — SIGNIFICANT CHANGE UP (ref 6–8.3)
PROTHROM AB SERPL-ACNC: 21.9 SEC — HIGH (ref 9.5–13)
RBC # BLD: 2.75 M/UL — LOW (ref 4.2–5.8)
RBC # FLD: 16 % — HIGH (ref 10.3–14.5)
SODIUM SERPL-SCNC: 129 MMOL/L — LOW (ref 135–145)
SPECIMEN SOURCE: SIGNIFICANT CHANGE UP
WBC # BLD: 8.77 K/UL — SIGNIFICANT CHANGE UP (ref 3.8–10.5)
WBC # FLD AUTO: 8.77 K/UL — SIGNIFICANT CHANGE UP (ref 3.8–10.5)

## 2024-09-03 PROCEDURE — 49082 ABD PARACENTESIS: CPT

## 2024-09-03 PROCEDURE — 76705 ECHO EXAM OF ABDOMEN: CPT | Mod: 26

## 2024-09-03 PROCEDURE — 88108 CYTOPATH CONCENTRATE TECH: CPT | Mod: 26

## 2024-09-03 PROCEDURE — 99231 SBSQ HOSP IP/OBS SF/LOW 25: CPT

## 2024-09-03 PROCEDURE — 49083 ABD PARACENTESIS W/IMAGING: CPT

## 2024-09-03 PROCEDURE — 99232 SBSQ HOSP IP/OBS MODERATE 35: CPT

## 2024-09-03 PROCEDURE — 88305 TISSUE EXAM BY PATHOLOGIST: CPT | Mod: 26

## 2024-09-03 RX ORDER — SODIUM PHOSPHATE, MONOBASIC, MONOHYDRATE AND SODIUM PHOSPHATE, DIBASIC ANHYDROUS 276; 142 MG/ML; MG/ML
15 INJECTION, SOLUTION INTRAVENOUS ONCE
Refills: 0 | Status: COMPLETED | OUTPATIENT
Start: 2024-09-03 | End: 2024-09-03

## 2024-09-03 RX ADMIN — URSODIOL 300 MILLIGRAM(S): 500 TABLET, FILM COATED ORAL at 21:28

## 2024-09-03 RX ADMIN — Medication 15 MILLIGRAM(S): at 15:00

## 2024-09-03 RX ADMIN — PIPERACILLIN SODIUM AND TAZOBACTAM SODIUM 25 GRAM(S): 3; .375 INJECTION, POWDER, FOR SOLUTION INTRAVENOUS at 05:30

## 2024-09-03 RX ADMIN — URSODIOL 300 MILLIGRAM(S): 500 TABLET, FILM COATED ORAL at 09:08

## 2024-09-03 RX ADMIN — ZOLPIDEM TARTRATE 5 MILLIGRAM(S): 5 TABLET, FILM COATED ORAL at 21:36

## 2024-09-03 RX ADMIN — Medication 15 MILLIGRAM(S): at 04:27

## 2024-09-03 RX ADMIN — MAGNESIUM OXIDE TAB 400 MG (240 MG ELEMENTAL MG) 400 MILLIGRAM(S): 400 (240 MG) TAB at 17:20

## 2024-09-03 RX ADMIN — ALBUMIN (HUMAN) 50 MILLILITER(S): 5 SOLUTION INTRAVENOUS at 02:00

## 2024-09-03 RX ADMIN — Medication 2 MILLIGRAM(S): at 17:38

## 2024-09-03 RX ADMIN — Medication 2 MILLIGRAM(S): at 11:09

## 2024-09-03 RX ADMIN — Medication 2 MILLIGRAM(S): at 21:36

## 2024-09-03 RX ADMIN — SULFAMETHOXAZOLE AND TRIMETHOPRIM 1 TABLET(S): 800; 160 TABLET ORAL at 21:28

## 2024-09-03 RX ADMIN — Medication 2 MILLIGRAM(S): at 22:06

## 2024-09-03 RX ADMIN — SODIUM PHOSPHATE, MONOBASIC, MONOHYDRATE AND SODIUM PHOSPHATE, DIBASIC ANHYDROUS 62.5 MILLIMOLE(S): 276; 142 INJECTION, SOLUTION INTRAVENOUS at 09:09

## 2024-09-03 RX ADMIN — SULFAMETHOXAZOLE AND TRIMETHOPRIM 1 TABLET(S): 800; 160 TABLET ORAL at 09:07

## 2024-09-03 RX ADMIN — Medication 15 MILLIGRAM(S): at 14:13

## 2024-09-03 RX ADMIN — Medication 20 MILLIGRAM(S): at 04:03

## 2024-09-03 RX ADMIN — MAGNESIUM OXIDE TAB 400 MG (240 MG ELEMENTAL MG) 400 MILLIGRAM(S): 400 (240 MG) TAB at 08:23

## 2024-09-03 RX ADMIN — Medication 15 MILLIGRAM(S): at 04:57

## 2024-09-03 RX ADMIN — PIPERACILLIN SODIUM AND TAZOBACTAM SODIUM 25 GRAM(S): 3; .375 INJECTION, POWDER, FOR SOLUTION INTRAVENOUS at 21:27

## 2024-09-03 RX ADMIN — Medication 2 MILLIGRAM(S): at 11:40

## 2024-09-03 RX ADMIN — Medication 1 MILLIGRAM(S): at 09:08

## 2024-09-03 RX ADMIN — PIPERACILLIN SODIUM AND TAZOBACTAM SODIUM 25 GRAM(S): 3; .375 INJECTION, POWDER, FOR SOLUTION INTRAVENOUS at 14:10

## 2024-09-03 RX ADMIN — Medication 2000 UNIT(S): at 09:08

## 2024-09-03 NOTE — CONSULT NOTE ADULT - PROVIDER SPECIALTY LIST ADULT
Heme/Onc
Intervent Radiology
Heme/Onc
Intervent Radiology
Gastroenterology
Infectious Disease
Palliative Care

## 2024-09-03 NOTE — PROGRESS NOTE ADULT - ASSESSMENT
48 y/o M with PMH metastatic colon CA with liver and lung metastases on panitumumab (last dose 7/10/24) who follows at [facility], HTN, obstructive jaundice s/p biliary drain, sepsis, bacteremia, UTI, cholangitis, biloma s/p IR drain placement presents with fever. Pt states that he had a Tmax of 101.8F at home.  He reports nausea and decreased drainage of right biliary drain (about 15 ml/day) when compared to left biliary drain (about 150-200 ml/day). Denies chills, chest pain, URI symptoms, cough, shortness of breath, chest pain, abdominal pain, vomiting, diarrhea, constipation, burning on urination. Please refer to recent admission below, pt was discharged on Levaquin and Flagyl which he completed yesterday. Pt was supposed to start chemotherapy and immunotherapy for his cancer 3 weeks ago but has been hospitalized. CT imaging in the ER reveal mild interval increase in size of the patient's large mass in the right lobe of the liver. Pt started on IV abx and restarted home pain regimen of Dilaudid 4mg PO q4hr PRN as well as MSIR 2mg IV q4hr PRN severe pain while hospitalized. Palliative medicine is consulted for further assistance with complex GOC and pain and symptoms management in setting of progressive metastatic disease.     Process of Care  --Reviewed dx/treatment problems and alignment with Goals of Care    Physical Aspects of Care  --Pain  patient denies at this time  c/w current managment  - dilaudid 4mg PO q4hr PRN  - morphine 2mg IV q4hr PRN     --Bowel Regimen  denies constipation  risk for constipation d/t immobility  daily dulcolax    --Dyspnea  No SOB at this time  comfortable and in NAD    --Nausea Vomiting  denies    --Weakness  PT as tolerated     Psychological and Psychiatric Aspects of Care:   --Greif/Bereavment: emotional support provided  --Hx of psychiatric dx: none  -Pastoral Care Available PRN     Social Aspects of Care  -SW involved     Cultural Aspects  -Primary Language: English    Goals of Care:     We discussed Palliative Care team being a supportive team when a patient has ongoing illnesses.  We also discussed that it is not an end of life care service, but can help navigate symptoms and emotional support throughout their hospital stay here.      Prognosis: Death can occur at anytime, but if disease continues to progress naturally patient likely has days to weeks.    Ethical and Legal Aspects:   NA        Capacity: pt has capacity for medical decision making   HCP/Surrogate:     Code Status: FULL CODE  MOLST: n/a  Dispo Plan: continue current abx, return home     Discussed With: Case coordinated with attending and SW and RN     Time Spent: 26 minutes including the care, coordination and counseling of this patient, 50% of which was spent coordinating and counseling.    At this time, pt's symptoms are controlled, pain well managed. Patient's goals are to continue all current treatments, awaiting 2nd opinion from [facility] oncology.     Palliative medicine service will sign off at this time, please feel free to re-engage our team for any further concerns.

## 2024-09-03 NOTE — PROGRESS NOTE ADULT - SUBJECTIVE AND OBJECTIVE BOX
Subjective:  Chief complain :  Fever      HPI:  48 y/o M with PMH metastatic colon CA with liver and lung metastases on panitumumab (last dose 7/10/24) who [place] at [facility] [place], h/o  Hepatitis C, s/p Rx ,  HTN, obstructive jaundice s/p biliary drain, sepsis, bacteremia, UTI, cholangitis, biloma s/p IR drain placement presents with fever. Pt states that he had a Tmax of 101.8F at home. He spoke to Dr. Michaels who advised him to come to the hosptial. He reports nausea and decreased drainage of right biliary drain (about 15 ml/day) when compared to left biliary drain (about 150-200 ml/day). Denies chills, chest pain, URI symptoms, cough, shortness of breath, chest pain, abdominal pain, vomiting, diarrhea, constipation, burning on urination. Please refer to recent admission below, pt was discharged on Levaquin and Flagyl which he completed yesterday. Pt was supposed to start chemotherapy and immunotherapy for his cancer 3 weeks ago but has been hospitalized.    Prior admission:   - 24: sepsis secondary to klebsiella oxytoca bacteremia, UTI with citrobacter, pseudomonas bacteremia, cholangitis d/t malignant biliary obstruction, biloma s/p IR drain placement, progression of metastatic disease -> discharged on Levaquin and Flagyl     ER course: HR . Labs: Hb 10.3 (baseline ~9), neutrophils 82.7%, INR 2.33, lactate 2.7 -> 1.8, glucose 124, albumin 2.0, total bilirubin 19.1, , . UA: unable to be performed due to color interference. RVP negative. EKG: Sinus tachycardia with  bpm, incomplete RBBB,  ms, no ST changes (personally reviewed).     Imaging:   - CT abd/pelvis: 1. Mild interval increase in size of the patient's large mass in the right  lobe of the liver. Additional lesions are similar to the prior examination. 2. Extensive retroperitoneal and periportal adenopathy, as previously. 3. Peritoneal carcinomatosis. 4. Metastases at the lung bases. 5. Mild-to-moderate ascites. 6. Cirrhosis.  - CXR: RLL infiltrate, small bilateral pleural effusions, no pneumothorax (personally reviewed).     Pt was given Meropenem, Zosyn, Vancomycin, 3100 ml of NS, Dilaudid, Oxycodone, IV tylenol. He was admitted to med/surg for further management.          -  Patient seen and examined at bedside earlier today, afebrile, denies cp, dyspnea, abdominal pain only at night, tolerating some po intake, + bad taste, plan discussed  - + epigastric abdominal pain, denies nausea, vomiting , poor po intake, + abdominal distension, plan discussed   - + epigastric pain controlled , denies nausea, cp, dyspnea, cough, + abdominal distension, + swelling hands, tolerating some po intake  - pain controlled, abdominal distension better, denies cp, dyspnea, afebrile, tolerating more po intake, plan discussed  9/3 - s/p 1.6 L paracentesis, feels better, + abdominal distention persist, afebrile, tolerating IV abx, plan discussed    Review of system- Rest of the review of system are negative except mentioned in HPI    Vital sings reviewed for last 24 h  T(C): 37.1 (24 @ 15:37), Max: 37.8 (24 @ 21:26)  T(F): 98.8 (24 @ 15:37), Max: 100 (24 @ 21:26)  HR: 99 (24 @ 15:37) (99 - 112)  BP: 114/68 (24 @ 15:37) (114/68 - 121/75)  RR: 17 (24 @ 15:37) (17 - 18)  SpO2: 97% (24 @ 15:37) (95% - 97%)  Wt(kg): --  Daily     Daily Weight in k.6 (03 Sep 2024 06:35)  CAPILLARY BLOOD GLUCOSE                Physical exam:   General : NAD, appear to be of stated age , well groomed   NERVOUS SYSTEM:  Alert & Oriented X3, non- focal exam, Motor Strength 5/5 B/L upper and lower extremities; DTRs 2+ intact and symmetric  HEAD:  Atraumatic, Normocephalic  EYES: EOMI, PERRLA, conjunctiva and sclera clear  HEENT: Moist mucous membranes, Supple neck , No JVD  CHEST: BS decreased at bases bilaterally; No rales, no rhonchi, no wheezing  HEART: Regular rate and rhythm; No murmurs, no rubs or gallops  ABDOMEN: Soft, Non-tender, + distended; + ascites ,  Bowel sounds present, no guarding , no peritoneal irritation   GENITOURINARY- Voiding, no suprapubic tenderness  EXTREMITIES:  2+ Peripheral Pulses, No clubbing, cyanosis,   edema  MUSCULOSKELETAL:- No muscle tenderness, Muscle tone normal, No joint tenderness, no Joint swelling,  Joint ROM –normal   SKIN-no rash, no lesion    Labs radiologic and other test : all reviewed and interpreted :                           8.9    8.77  )-----------( 176      ( 03 Sep 2024 07:06 )             26.6     09-    129<L>  |  95<L>  |  9   ----------------------------<  116<H>  3.5   |  27  |  0.50    Ca    8.4<L>      03 Sep 2024 07:06  Phos  1.5       Mg     1.7     09-    TPro  6.4  /  Alb  2.4<L>  /  TBili  18.8<H>  /  DBili  14.5<H>  /  AST  275<H>  /  ALT  35  /  AlkPhos  214<H>  -        LIVER FUNCTIONS - ( 03 Sep 2024 07:06 )  Alb: 2.4 g/dL / Pro: 6.4 gm/dL / ALK PHOS: 214 U/L / ALT: 35 U/L / AST: 275 U/L / GGT: x                                                       < from: Xray Chest 1 View-PORTABLE IMMEDIATE (24 @ 22:09) >    IMPRESSION: Right superior mediastinal mass somewhat increased. Pulmonary   metastases again noted. Small infiltrate off right hilum is presently   suggested.         CT Abdomen and Pelvis w/ IV Cont (24 @ 23:51) >  IMPRESSION:  1.   Metastatic disease with interval tendency to increase in size of the   hepatic metastases, some pulmonary metastases and abdominal   lymphadenopathy.    2.  Mild biliary duct dilatation in the right hepatic lobe despite   central CBD stent extending to the right biliary ductal system, no   pneumobilia. Stent patency is to be questioned. Stable left biliary   ductal percutaneous stent.    3. Right subhepatic percutaneous catheter without residual localized   collection but surrounding ascites. Correlate with drain output.    4. Peritoneal carcinomatosis        RECENT CULTURES:  Culture - Blood (24 @ 21:48)    -  Blood PCR Panel: NEG   -  Trimethoprim/Sulfamethoxazole: R >2/38   -  Tobramycin: I 8   -  Piperacillin/Tazobactam: S <=8   Gram Stain:   Growth in aerobic bottle: Gram Negative Rods   -  Amikacin: I 32   -  Aztreonam: R >16   -  Cefepime: R >16   -  Meropenem: S 2   -  Levofloxacin: R >4   -  Gentamicin: I 8   -  Ceftriaxone: I 32   -  Ciprofloxacin: R >2   Specimen Source: .Blood None   Organism: Blood Culture PCR   Organism: Achromobacter xylosoxidans   Culture Results:   Growth in aerobic bottle: Achromobacter xylosoxidans  Direct identification is available within approximately 3-5  hours either by Blood Panel Multiplexed PCR or Direct  MALDI-TOF. Details: [email or web address]   Organism Identification: Blood Culture PCR  Achromobacter xylosoxidans   Method Type: PCR   Method Type: AIDAN    Culture - Blood (24 @ 12:17)    Specimen Source: .Blood None   Culture Results:   No growth at 48 Hours    Culture - Body Fluid with Gram Stain (24 @ 07:50)    -  Oxacillin: R >2   -  Oxacillin: R >2   -  Penicillin: R 2   -  Penicillin: R >8   -  Rifampin: S <=1 Should not be used as monotherapy   -  Rifampin: R >2 Should not be used as monotherapy   -  Tetracycline: S <=1   -  Tetracycline: R >8   -  Vancomycin: S 1   -  Vancomycin: S 1   -  Trimethoprim/Sulfamethoxazole: S <=0.5/9.5   -  Trimethoprim/Sulfamethoxazole: R >2/38   Gram Stain:   No polymorphonuclear cells seen per low power field  No organisms seen per oil power field   -  Clindamycin: R >4   -  Clindamycin: R <=0.25 This isolate is presumed to be clindamycin resistant based on detection of inducible resistance. Clindamycin may still be effective in some patients.   -  Erythromycin: R >4   -  Erythromycin: R >4   -  Gentamicin: S <=1 Should not be used as monotherapy   -  Gentamicin: S <=1 Should not be used as monotherapy   Specimen Source: Body Fluid RIGHT BILIARY DRAIN   Culture Results:   Rare Staphylococcus epidermidis  Rare Staphylococcus haemolyticus   Organism Identification: Staphylococcus haemolyticus  Staphylococcus epidermidis   Organism: Staphylococcus epidermidis   Organism: Staphylococcus haemolyticus   Method Type: AIDAN   Method Type: AIDAN    Culture - Body Fluid with Gram Stain (24 @ 07:43)    -  Levofloxacin: R >4   Gram Stain:   No polymorphonuclear leukocytes seen per low power field  Moderate Gram Negative Rods seen per oil power field   -  Minocycline: R   -  Trimethoprim/Sulfamethoxazole: S    Specimen Source: Body Fluid LEFT BILIARY DRAIN   Culture Results:   Numerous Stenotrophomonas maltophilia   Organism Identification: Stenotrophomonas maltophilia  Stenotrophomonas maltophilia   Organism: Stenotrophomonas maltophilia   Organism: Stenotrophomonas maltophilia   Method Type: AIDAN   Method Type: KB          Cardiac testing : reviewed   EKG      12 Lead ECG (24 @ 21:31) >  Ventricular Rate 128 BPM  QTC Calculation(Bazett) 481 ms  Sinus tachycardia  Incomplete right bundle branch block  ST & T wave abnormality, consider anterior ischemia  Abnormal ECG    Procedures :     Devices:     MEDICATIONS  (STANDING):  albumin human 25% IVPB 100 milliLiter(s) IV Intermittent every 12 hours  cholecalciferol 2000 Unit(s) Oral daily  folic acid 1 milliGRAM(s) Oral daily  furosemide   Injectable 20 milliGRAM(s) IV Push every 12 hours  magnesium oxide 400 milliGRAM(s) Oral two times a day with meals  piperacillin/tazobactam IVPB.. 3.375 Gram(s) IV Intermittent every 8 hours  rifAXIMin 550 milliGRAM(s) Oral two times a day  trimethoprim  160 mG/sulfamethoxazole 800 mG 1 Tablet(s) Oral every 12 hours  ursodiol Capsule 300 milliGRAM(s) Oral every 12 hours    MEDICATIONS  (PRN):  acetaminophen     Tablet .. 650 milliGRAM(s) Oral every 6 hours PRN Temp greater or equal to 38C (100.4F), Mild Pain (1 - 3)  aluminum hydroxide/magnesium hydroxide/simethicone Suspension 30 milliLiter(s) Oral every 4 hours PRN Dyspepsia  benzonatate 100 milliGRAM(s) Oral every 8 hours PRN for cough  bisacodyl 5 milliGRAM(s) Oral daily PRN Constipation  morphine  - Injectable 2 milliGRAM(s) IV Push every 4 hours PRN Severe Pain (7 - 10)  morphine  IR 15 milliGRAM(s) Oral every 6 hours PRN Severe Pain (7 - 10)  ondansetron Injectable 4 milliGRAM(s) IV Push every 8 hours PRN Nausea and/or Vomiting  polyethylene glycol 3350 17 Gram(s) Oral daily PRN for constipation  zolpidem 5 milliGRAM(s) Oral at bedtime PRN Insomnia

## 2024-09-03 NOTE — PROGRESS NOTE ADULT - ASSESSMENT
Assessment:  49M with CA with liver and lung metastases on panitumumab, HTN, recurrent polymicrobial bacteremia due to recurrent acute cholangitis in setting of malignant biliary obstruction, s/p biliary drain placement presents 8/29 for decreased drainage from his catheters and low-grade fevers.   Most recently, patient was just discharged 8/17 for Polymicrobial Bacteremia, Kleb oxytoca, Pseudomonas putida due to Recurrent Ascending Cholangitis likely due to Malignant biliary obstruction, treated with IV antibiotic and eventually completed 2-weeks of PO antibiotic on 8/26/2024  Afebrile on admission, on RA  No leukocytosis  Elevated AST/ALT in 200s, Bili 2  CTAP with Mild interval increase in size of the patient&apos;s large mass in the rightmlobe of the liver. Additional lesions are similar to the prior examination.mExtensive retroperitoneal and periportal adenopathy, as previously., Peritoneal carcinomatosis., Metastases at the lung bases. Mild-to-moderate ascites.  RVP negative  BCx 8/28 Achromobacter S Zosyn, Nirav R Fluorquinolone/Bactrim, Cefepime  BCx 8/30 NGTD  CT with mild biliary duct dilatation in the right hepatic lobe despite central CBD stent extending to the right biliary ductal system, no pneumobilia. Stent patency is to be questioned.   L Biliary Drain Cx 8/30 Steno R Fluoro/Geovanni, S Bactrim  R Biliary Drain Cx 8/30 Staph epi and Staph hemolyticus     Prior cultures:  BCx 8/1  Kleb oxytoca, Pseudomonas putida  BCx 8/4 NGTD  BCx 8/8 with Pseudomonas putida   BCx 8/12 NGTD    Antimicrobials:  piperacillin/tazobactam IVPB.. 3.375 every 8 hours (8/28 --- )    Impression:   #Recurrent GNR Bacteremia  #Achromobacter Bacteremia   #Fever   #Hx of Recurrent Ascending Cholangitis likely due to Malignant biliary obstruction  #Hx of Polymicrobial Bacteremia, Kleb oxytoca, Pseudomonas putida  #Metastatic Colon Cancer  - chemoport R chest wall without s/o infection, biliary drain exit site with some redness, but appears more irritation rather than soft tissue infection  - Blood culture with Achromobacter, no oral options based on sensitivity, limited data on the use of Ertapenem  - Biliary drainage cultures likely colonization, but will treat for Stenotrophomonas, and not CoNS  - remains afebrile, nontoxic appearing, no acute complaints     Recommendations:  - continue Zosyn 3.375 q8  - continue Bactrim DS 1tab q12 (complete 7-day course; enddate 9/8/2024)  - monitor temperature curve  - trend WBC  - side effects of antibiotic discussed, tolerating abx well so far  - follow up repeat BCx x2 (8/30)  - Heme/onc following  - GI eval noted  - IR eval noted  - Patient is high risk for recurrent bacteremia/cholangitis given malignancy  - Spoke with Heme/Onc Dr. Garrett, will use chemoport as IV acceess for antibiotic  - Will plan for IV Meropenem 1G q8 via R chest chemoport as access, to complete 14-day course (enddate: 9/13/24)    Clinical team may change from intravenous to oral antibiotics when the following criteria are met:   1. Patient is clinically improving/stable       a)	Improved signs and symptoms of infection from initial presentation       b)	Afebrile for 24 hours       c)	Leukocytosis trending towards normal range   2. Patient is tolerating oral intake   3. Initial/repeat blood cultures are negative OR do not need to wait for preliminary blood cultures to result    N/A

## 2024-09-03 NOTE — CONSULT NOTE ADULT - ASSESSMENT
Interventional Radiology    Evaluate for Procedure: Paracentesis and y90    HPI: 49y Male with Metastatic colon CA with ascites IR consulted for paracentesis and y90.    Allergies: No Known Allergies    Medications (Abx/Cardiac/Anticoagulation/Blood Products)    furosemide   Injectable: 20 milliGRAM(s) IV Push ( @ 04:03)  piperacillin/tazobactam IVPB..: 25 mL/Hr IV Intermittent ( @ 05:30)  rifAXIMin: 550 milliGRAM(s) Oral ( @ 09:09)  trimethoprim  160 mG/sulfamethoxazole 800 m Tablet(s) Oral ( @ 09:07)    Data:    T(C): 37.6  HR: 102  BP: 119/80  RR: 18  SpO2: 95%    -WBC 8.77 / HgB 8.9 / Hct 26.6 / Plt 176  -Na 129 / Cl 95 / BUN 9 / Glucose 116  -K 3.5 / CO2 27 / Cr 0.50  -ALT 35 / Alk Phos 214 / T.Bili 18.8  -INR 1.98 / PTT --          Radiology: < from: CT Abdomen and Pelvis w/ IV Cont (24 @ 23:51) >   Metastatic disease with interval tendency to increase in size of the   hepatic metastases, some pulmonary metastases and abdominal   lymphadenopathy.    < end of copied text >      Assessment/Plan: 49y Male with Metastatic colon CA with ascites IR consulted for paracentesis and y90.   ALT 35 / Alk Phos 214 / T.Bili 18.8    - Ultrasound reviewed, can plan for paracentesis today.      Y90  - Case and imaging reviewed with Dr. Mascorro, pt doesn't have enough liver reserve for y90. He can go into liver failure with the procedure. Therefore, pt not a candidate for y90.

## 2024-09-03 NOTE — PROGRESS NOTE ADULT - ASSESSMENT
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 Mcnair   - IR consult - right bilary drain removed  - biliary fluid 1 of 2 cultures positive for stenotrophomonas maltophilia , staph epidermitis - will d/w ID team  - repeat blood cultures 8/30 - neg  - 9/1 - added bactrim DS continue Bactrim DS 1tab q12 (complete 7-day course; enddate 9/8/2024)  - plan for IV Meropenem 1G q8 via R chest chemoport as access, to complete 14-day course (enddate: 9/13/24)    # Right biliary drain with decreased drainage   - Drains exchanged 8/22 per IR   - SCDs for now if procedure needed   - Pt had breakfast already this morning -> will need to be NPO if intervention planned   - IR consult - Dr. Mascorro   - right drain removed 8/30/24     # Hyperbilirubinemia, transaminitis, obstructive jaundice secondary to increase in size of large mass in the right lobe of the liver and progression of metastatic  colon cancer , Lung metastasis   # h/o  Hepatitis C  s/p Rx  - Trend LFTs and bilirubin   - on panitumumab (last dose 7/10/24)  - Heme/Onc consult - Dr. Arguello   -second opinion [facility] oncology consult requested Dr. Gonzalez - WIll need to obtain ERIK WT status, MMR status, BRAF status, and TMB (tumor mutational burden status), TRK fusion status, etc from previous pathology reports If tumor mutational burden level  elevated we can determine if he would be an immunotherapy candidate, this can  be done in the outpatient setting based on previous lines of therapy can consider treatment with irinotecan + mikaela, or mikaela plus chemo doublet, cap/mikaela, trifluridine-tipiracil, regorafenib, fruquitinib etc but will discuss more when all records available and pt follows in our office  - Bili 19--> 15--> 13--> 14.9--> 15--> 18  -  AFP  neg - h/o hepatitis C   - IR consult - not an  candidate for large liver  mass Y-90 radiation   - immunoglobulin panel -  wnl     # Moderate   ascites , Hypervolumic hyponatremia  - May need paracentesis, will f/u with IR and GI   - GI consult - Dr. Quintero   - s/p  IV albumin with IV lasix will stop 9/3 due to hyponatremia  - monitor Na, daily weight  - 8/15 - s/p 150 cc paracentesis , cytology neg for malignant cells  - repeat Us abd - moderate ascites   - 9/3 s/p 1.6 L paracentesis    #  Hypoalbuminemia   - Albumin 2.0 --> 2.2 --> 2.4  - c/w IV albumin  - Nutrition consult  - add supplements    # Coagulopathy  - INR > 2--> 1.9--> 2.0   - 8/31 - vit K 2.5 mg x1 dose  - 9/2 - vit K 5 mg     #  Abdominal pain  - stop dialudid - hepatic excretion   - trial of morphine PO prn with IV morphine prn    # Insomnia  - ambien    DVT ppx: SCDs for now -> advance to pharmacological if no planned procedures   Code status: Full code   Emergency contact: Pt will update his wife    Dispo - IV abx, ID team follows ,  oncology [facility] team, d/c planning with IV abx via [employer]

## 2024-09-03 NOTE — PROGRESS NOTE ADULT - SUBJECTIVE AND OBJECTIVE BOX
Date of Service:09-03-24 @ 10:17  Interval History/ROS: Afebrile overnight. Patient assessed at bedside this morning. Denied any acute complaints.      REVIEW OF SYSTEMS  [  ] ROS unobtainable because:    [ x ] All other systems negative except as noted below    Constitutional:  [ ] fever [ ] chills  [ ] weight loss  [ ]night sweat  [ ]poor appetite/PO intake [ ]fatigue   Skin:  [ ] rash [ ] phlebitis	  Eyes: [ ] icterus [ ] pain  [ ] discharge	  ENMT: [ ] sore throat  [ ] thrush [ ] ulcers [ ] exudates [ ]anosmia  Respiratory: [ ] dyspnea [ ] hemoptysis [ ] cough [ ] sputum	  Cardiovascular:  [ ] chest pain [ ] palpitations [ ] edema	  Gastrointestinal:  [ ] nausea [ ] vomiting [ ] diarrhea [ ] constipation [ ] pain	  Genitourinary:  [ ] dysuria [ ] frequency [ ] hematuria [ ] discharge [ ] flank pain  [ ] incontinence  Musculoskeletal:  [ ] myalgias [ ] arthralgias [ ] arthritis  [ ] back pain  Neurological:  [ ] headache [ ] weakness [ ] seizures  [ ] confusion/altered mental status    Allergies  No Known Allergies        ANTIMICROBIALS:    piperacillin/tazobactam IVPB.. 3.375 every 8 hours  rifAXIMin 550 two times a day  trimethoprim  160 mG/sulfamethoxazole 800 mG 1 every 12 hours        OTHER MEDS: MEDICATIONS  (STANDING):  acetaminophen     Tablet .. 650 every 6 hours PRN  aluminum hydroxide/magnesium hydroxide/simethicone Suspension 30 every 4 hours PRN  benzonatate 100 every 8 hours PRN  bisacodyl 5 daily PRN  morphine  - Injectable 2 every 4 hours PRN  morphine  IR 15 every 6 hours PRN  ondansetron Injectable 4 every 8 hours PRN  polyethylene glycol 3350 17 daily PRN  ursodiol Capsule 300 every 12 hours  zolpidem 5 at bedtime PRN      Vital Signs Last 24 Hrs  T(F): 99.6 (09-03-24 @ 08:53), Max: 100 (09-02-24 @ 21:26)    Vital Signs Last 24 Hrs  HR: 102 (09-03-24 @ 08:53) (102 - 112)  BP: 119/80 (09-03-24 @ 08:53) (119/80 - 121/75)  RR: 18 (09-03-24 @ 08:53)  SpO2: 95% (09-03-24 @ 08:53) (95% - 98%)  Wt(kg): --    EXAM:    Constitutional:  well preserved, comfortable  Head/Eyes: +icterus  LUNGS:  CTA  CVS:  regular rhythm  Abd:  soft, non-tender; +distended +drainage  Ext:  no edema  Vascular:  IV site no erythema tenderness or discharge  Neuro: AAO X 3, non- focal    Labs:                        8.9    8.77  )-----------( 176      ( 03 Sep 2024 07:06 )             26.6     09-03    129<L>  |  95<L>  |  9   ----------------------------<  116<H>  3.5   |  27  |  0.50    Ca    8.4<L>      03 Sep 2024 07:06  Phos  1.5     09-03  Mg     1.7     09-03    TPro  6.4  /  Alb  2.4<L>  /  TBili  18.8<H>  /  DBili  14.5<H>  /  AST  275<H>  /  ALT  35  /  AlkPhos  214<H>  09-03      WBC Trend:  WBC Count: 8.77 (09-03-24 @ 07:06)  WBC Count: 7.88 (09-02-24 @ 06:00)  WBC Count: 6.98 (09-01-24 @ 06:48)  WBC Count: 6.48 (08-31-24 @ 06:34)      Creatine Trend:  Creatinine: 0.50 (09-03)  Creatinine: 0.45 (09-02)  Creatinine: 0.35 (09-01)  Creatinine: 0.36 (08-31)      Liver Biochemical Testing Trend:  Alanine Aminotransferase (ALT/SGPT): 35 (09-03)  Alanine Aminotransferase (ALT/SGPT): 34 (09-02)  Alanine Aminotransferase (ALT/SGPT): 32 (09-01)  Alanine Aminotransferase (ALT/SGPT): 29 (08-31)  Alanine Aminotransferase (ALT/SGPT): 32 (08-30)  Aspartate Aminotransferase (AST/SGOT): 275 (09-03-24 @ 07:06)  Aspartate Aminotransferase (AST/SGOT): 267 (09-02-24 @ 06:00)  Aspartate Aminotransferase (AST/SGOT): 267 (09-01-24 @ 06:48)  Aspartate Aminotransferase (AST/SGOT): 242 (08-31-24 @ 06:34)  Aspartate Aminotransferase (AST/SGOT): 239 (08-30-24 @ 07:39)  Bilirubin Direct: 14.5 (09-03)  Bilirubin Total: 18.8 (09-03)  Bilirubin Direct: 12.6 (09-02)  Bilirubin Total: 15.9 (09-02)  Bilirubin Direct: 11.7 (09-01)      Trend LDH      Urinalysis Basic - ( 03 Sep 2024 07:06 )    Color: x / Appearance: x / SG: x / pH: x  Gluc: 116 mg/dL / Ketone: x  / Bili: x / Urobili: x   Blood: x / Protein: x / Nitrite: x   Leuk Esterase: x / RBC: x / WBC x   Sq Epi: x / Non Sq Epi: x / Bacteria: x        MICROBIOLOGY:        Culture - Urine (collected 01 Sep 2024 12:23)  Source: Clean Catch Clean Catch (Midstream)  Final Report:    <10,000 CFU/mL Normal Urogenital Gwendolyn    Culture - Blood (collected 30 Aug 2024 12:23)  Source: .Blood None  Preliminary Report:    No growth at 72 Hours    Culture - Blood (collected 30 Aug 2024 12:17)  Source: .Blood None  Preliminary Report:    No growth at 72 Hours    Culture - Body Fluid with Gram Stain (collected 30 Aug 2024 07:50)  Source: Body Fluid RIGHT BILIARY DRAIN  Preliminary Report:    Rare Staphylococcus epidermidis    Rare Staphylococcus haemolyticus  Organism: Staphylococcus haemolyticus  Staphylococcus epidermidis  Organism: Staphylococcus haemolyticus    Sensitivities:      Method Type: AIDAN      -  Clindamycin: R <=0.25 This isolate is presumed to be clindamycin resistant based on detection of inducible resistance. Clindamycin may still be effective in some patients.      -  Erythromycin: R >4      -  Gentamicin: S <=1 Should not be used as monotherapy      -  Oxacillin: R >2      -  Penicillin: R >8      -  Rifampin: S <=1 Should not be used as monotherapy      -  Tetracycline: R >8      -  Trimethoprim/Sulfamethoxazole: S <=0.5/9.5      -  Vancomycin: S 1  Organism: Staphylococcus epidermidis    Sensitivities:      Method Type: AIDAN      -  Clindamycin: R >4      -  Erythromycin: R >4      -  Gentamicin: S <=1 Should not be used as monotherapy      -  Oxacillin: R >2      -  Penicillin: R 2      -  Rifampin: R >2 Should not be used as monotherapy      -  Tetracycline: S <=1      -  Trimethoprim/Sulfamethoxazole: R >2/38      -  Vancomycin: S 1    Culture - Body Fluid with Gram Stain (collected 30 Aug 2024 07:43)  Source: Body Fluid LEFT BILIARY DRAIN  Preliminary Report:    Numerous Stenotrophomonas maltophilia  Organism: Stenotrophomonas maltophilia  Stenotrophomonas maltophilia  Organism: Stenotrophomonas maltophilia    Sensitivities:      Method Type: AIDAN      -  Levofloxacin: R >4      -  Trimethoprim/Sulfamethoxazole: S 2/38  Organism: Stenotrophomonas maltophilia    Sensitivities:      Method Type: KB      -  Minocycline: R    Urinalysis with Rflx Culture (collected 29 Aug 2024 00:47)    Culture - Blood (collected 28 Aug 2024 21:48)  Source: .Blood None  Final Report:    Growth in aerobic bottle: Achromobacter xylosoxidans    See previous culture [id number]    Culture - Blood (collected 28 Aug 2024 21:48)  Source: .Blood None  Final Report:    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 Details: [email or web address]  Organism: Blood Culture PCR  Achromobacter xylosoxidans  Organism: Achromobacter xylosoxidans    Sensitivities:      Method Type: AIDAN      -  Amikacin: I 32      -  Aztreonam: R >16      -  Cefepime: R >16      -  Ceftriaxone: I 32      -  Ciprofloxacin: R >2      -  Gentamicin: I 8      -  Levofloxacin: R >4      -  Meropenem: S 2      -  Piperacillin/Tazobactam: S <=8      -  Tobramycin: I 8      -  Trimethoprim/Sulfamethoxazole: R >2/38  Organism: Blood Culture PCR    Sensitivities:      Method Type: PCR      -  Blood PCR Panel: NEG    Culture - Fungal, Body Fluid (collected 15 Aug 2024 15:07)  Source: Peritoneal Peritoneal Fluid  Preliminary Report:    No fungus isolated at 2 weeks.    Culture - Body Fluid with Gram Stain (collected 15 Aug 2024 15:07)  Source: Ascites Fl Ascites Fluid  Final Report:    No growth at 5 days    Procalcitonin: 0.52 (09-01)  Procalcitonin: 0.52 (08-31)    C-Reactive Protein: 65 (09-01)  C-Reactive Protein: 70 (08-31)      RADIOLOGY:  imaging below personally reviewed

## 2024-09-03 NOTE — PROGRESS NOTE ADULT - SUBJECTIVE AND OBJECTIVE BOX
INTERVAL HPI/OVERNIGHT EVENTS:  Patient S&E at bedside. No o/n events,    no plan for additional therapy as per Dr Blood.   GI eval noted, no role for ERCP.   Completed course of abx as per ID   Today, WBC 8.77, HB 8.9, plt 176, bili 18.8 and trending back up     PAST MEDICAL & SURGICAL HISTORY:  Colon cancer      Hypertension      Metastasis to liver      Metastasis to lung      Obstructive jaundice      H/O sepsis      H/O bacteremia      History of cholangitis      History of biliary stent insertion      S/P hernia surgery      History of ablation of neoplasm of liver      S/P colon resection          FAMILY HISTORY:  FH: heart attack (Father)        VITAL SIGNS:  T(F): 100 (09-02-24 @ 21:26)  HR: 112 (09-02-24 @ 21:26)  BP: 121/75 (09-02-24 @ 21:26)  RR: 18 (09-02-24 @ 21:26)  SpO2: 95% (09-02-24 @ 21:26)  Wt(kg): --    PHYSICAL EXAM:    Constitutional: NAD, anxious   Eyes: EOMI, sclera-icteric  Respiratory: CTA b/l  Cardiovascular: RRR,  Gastrointestinal: soft, NTND  Neurological: AAOx3      MEDICATIONS  (STANDING):  albumin human 25% IVPB 100 milliLiter(s) IV Intermittent every 12 hours  cholecalciferol 2000 Unit(s) Oral daily  folic acid 1 milliGRAM(s) Oral daily  magnesium oxide 400 milliGRAM(s) Oral two times a day with meals  piperacillin/tazobactam IVPB.. 3.375 Gram(s) IV Intermittent every 8 hours  rifAXIMin 550 milliGRAM(s) Oral two times a day  sodium phosphate 15 milliMole(s)/250 mL IVPB 15 milliMole(s) IV Intermittent once  trimethoprim  160 mG/sulfamethoxazole 800 mG 1 Tablet(s) Oral every 12 hours  ursodiol Capsule 300 milliGRAM(s) Oral every 12 hours    MEDICATIONS  (PRN):  acetaminophen     Tablet .. 650 milliGRAM(s) Oral every 6 hours PRN Temp greater or equal to 38C (100.4F), Mild Pain (1 - 3)  aluminum hydroxide/magnesium hydroxide/simethicone Suspension 30 milliLiter(s) Oral every 4 hours PRN Dyspepsia  benzonatate 100 milliGRAM(s) Oral every 8 hours PRN for cough  bisacodyl 5 milliGRAM(s) Oral daily PRN Constipation  morphine  - Injectable 2 milliGRAM(s) IV Push every 4 hours PRN Severe Pain (7 - 10)  morphine  IR 15 milliGRAM(s) Oral every 6 hours PRN Severe Pain (7 - 10)  ondansetron Injectable 4 milliGRAM(s) IV Push every 8 hours PRN Nausea and/or Vomiting  polyethylene glycol 3350 17 Gram(s) Oral daily PRN for constipation  zolpidem 5 milliGRAM(s) Oral at bedtime PRN Insomnia      Allergies    No Known Allergies    Intolerances        LABS:                        8.9    8.77  )-----------( 176      ( 03 Sep 2024 07:06 )             26.6     09-03    129<L>  |  95<L>  |  9   ----------------------------<  116<H>  3.5   |  27  |  0.50    Ca    8.4<L>      03 Sep 2024 07:06  Phos  1.5     09-03  Mg     1.7     09-03    TPro  6.4  /  Alb  2.4<L>  /  TBili  18.8<H>  /  DBili  14.5<H>  /  AST  275<H>  /  ALT  35  /  AlkPhos  214<H>  09-03    PT/INR - ( 03 Sep 2024 07:06 )   PT: 21.9 sec;   INR: 1.98 ratio           Urinalysis Basic - ( 03 Sep 2024 07:06 )    Color: x / Appearance: x / SG: x / pH: x  Gluc: 116 mg/dL / Ketone: x  / Bili: x / Urobili: x   Blood: x / Protein: x / Nitrite: x   Leuk Esterase: x / RBC: x / WBC x   Sq Epi: x / Non Sq Epi: x / Bacteria: x        RADIOLOGY & ADDITIONAL TESTS:  Studies reviewed.

## 2024-09-03 NOTE — PROGRESS NOTE ADULT - ASSESSMENT
49-year-old male with a significant history of asthma and colon cancer, initially diagnosed in 2019. He underwent neoadjuvant chemotherapy followed by surgical resection and adjuvant treatment. In 2022, he was found to have progressive disease with metastasis to the liver and lung, confirmed via biomarkers.    # metastatic colon ca  The patient has undergone multiple lines of therapy and is currently on panitumumab under the care of Dr. Blood.   He has had several hospitalizations and has experienced recurrent biliomas, which have been drained multiple times.   Recently, he has been unable to complete systemic therapy due to complications.  CT a/p with increased in hepatic mets and pulm mets w/ abd LAD, mild biliary duct dilatation in right hepatic lobe despite central cbd stent and peritoneal carcinomatosis   bili 14.9-->15.9-->18.8 today   IR consulted for evaluation of drain - right drain removed 8/30  pt's wife d/w Dr Blood 8/30.  no further plans for anticancer therapy from Onc perspective   palliative care following.  GOC conversations ongoing  currently full code.      # GNR bacteremia   The patient presented with decreased drainage from his catheters and low-grade fevers. His most recent blood cultures were positive for Klebsiella oxytoca, and he was recommended to continue a 2-week course of antibiotics.   He reported high fevers, approximately 38.8°C (101.84°F), and decreased output from his drainage catheters.  biloma drain removed 8/30  Continue the current antibiotic regimen for Klebsiella oxytoca as previously recommended.- ID following - now on ZOSYN   BCx 8/30 - negative   Fluid cultures -  stenotrophomonas maltophilia; ID following   GI eval noted.  no role for ERCP       will follow daily   will update MSK

## 2024-09-04 ENCOUNTER — TRANSCRIPTION ENCOUNTER (OUTPATIENT)
Age: 49
End: 2024-09-04

## 2024-09-04 LAB
ALBUMIN SERPL ELPH-MCNC: 2.1 G/DL — LOW (ref 3.3–5)
ALP SERPL-CCNC: 182 U/L — HIGH (ref 40–120)
ALT FLD-CCNC: 37 U/L — SIGNIFICANT CHANGE UP (ref 12–78)
AMMONIA BLD-MCNC: 38 UMOL/L — HIGH (ref 11–32)
ANION GAP SERPL CALC-SCNC: 9 MMOL/L — SIGNIFICANT CHANGE UP (ref 5–17)
AST SERPL-CCNC: 260 U/L — HIGH (ref 15–37)
B PERT IGG+IGM PNL SER: ABNORMAL
BILIRUB DIRECT SERPL-MCNC: 14.8 MG/DL — HIGH (ref 0–0.3)
BILIRUB INDIRECT FLD-MCNC: 4.8 MG/DL — HIGH (ref 0.2–1)
BILIRUB SERPL-MCNC: 19.6 MG/DL — HIGH (ref 0.2–1.2)
BUN SERPL-MCNC: 8 MG/DL — SIGNIFICANT CHANGE UP (ref 7–23)
CALCIUM SERPL-MCNC: 8.4 MG/DL — LOW (ref 8.5–10.1)
CHLORIDE SERPL-SCNC: 94 MMOL/L — LOW (ref 96–108)
CO2 SERPL-SCNC: 27 MMOL/L — SIGNIFICANT CHANGE UP (ref 22–31)
COLOR FLD: SIGNIFICANT CHANGE UP
CREAT SERPL-MCNC: 0.44 MG/DL — LOW (ref 0.5–1.3)
CULTURE RESULTS: ABNORMAL
CULTURE RESULTS: ABNORMAL
CULTURE RESULTS: SIGNIFICANT CHANGE UP
CULTURE RESULTS: SIGNIFICANT CHANGE UP
EGFR: 129 ML/MIN/1.73M2 — SIGNIFICANT CHANGE UP
EOSINOPHIL # FLD: 1 % — SIGNIFICANT CHANGE UP
FLUID INTAKE SUBSTANCE CLASS: SIGNIFICANT CHANGE UP
FOLATE+VIT B12 SERBLD-IMP: 0 % — SIGNIFICANT CHANGE UP
GLUCOSE SERPL-MCNC: 103 MG/DL — HIGH (ref 70–99)
HCT VFR BLD CALC: 27 % — LOW (ref 39–50)
HGB BLD-MCNC: 8.9 G/DL — LOW (ref 13–17)
INR BLD: 1.83 RATIO — HIGH (ref 0.85–1.18)
LYMPHOCYTES # FLD: 38 % — SIGNIFICANT CHANGE UP
MAGNESIUM SERPL-MCNC: 1.9 MG/DL — SIGNIFICANT CHANGE UP (ref 1.6–2.6)
MCHC RBC-ENTMCNC: 31.9 PG — SIGNIFICANT CHANGE UP (ref 27–34)
MCHC RBC-ENTMCNC: 33 GM/DL — SIGNIFICANT CHANGE UP (ref 32–36)
MCV RBC AUTO: 96.8 FL — SIGNIFICANT CHANGE UP (ref 80–100)
MESOTHL CELL # FLD: 1 % — SIGNIFICANT CHANGE UP
MONOS+MACROS # FLD: 48 % — SIGNIFICANT CHANGE UP
NEUTROPHILS-BODY FLUID: 12 % — SIGNIFICANT CHANGE UP
NRBC # FLD: 0 % — SIGNIFICANT CHANGE UP
NT-PROBNP SERPL-SCNC: 448 PG/ML — HIGH (ref 0–125)
ORGANISM # SPEC MICROSCOPIC CNT: ABNORMAL
ORGANISM # SPEC MICROSCOPIC CNT: SIGNIFICANT CHANGE UP
ORGANISM # SPEC MICROSCOPIC CNT: SIGNIFICANT CHANGE UP
OTHER CELLS FLD MANUAL: 0 % — SIGNIFICANT CHANGE UP
PHOSPHATE SERPL-MCNC: 1.7 MG/DL — LOW (ref 2.5–4.5)
PLATELET # BLD AUTO: 173 K/UL — SIGNIFICANT CHANGE UP (ref 150–400)
POTASSIUM SERPL-MCNC: 3.7 MMOL/L — SIGNIFICANT CHANGE UP (ref 3.5–5.3)
POTASSIUM SERPL-SCNC: 3.7 MMOL/L — SIGNIFICANT CHANGE UP (ref 3.5–5.3)
PROT SERPL-MCNC: 5.9 GM/DL — LOW (ref 6–8.3)
PROTHROM AB SERPL-ACNC: 20.3 SEC — HIGH (ref 9.5–13)
RBC # BLD: 2.79 M/UL — LOW (ref 4.2–5.8)
RBC # FLD: 15.9 % — HIGH (ref 10.3–14.5)
RCV VOL RI: HIGH /UL (ref 0–0)
SODIUM SERPL-SCNC: 130 MMOL/L — LOW (ref 135–145)
SPECIMEN SOURCE: SIGNIFICANT CHANGE UP
TOTAL NUCLEATED CELL COUNT, BODY FLUID: 691 /UL — SIGNIFICANT CHANGE UP
TUBE TYPE: SIGNIFICANT CHANGE UP
WBC # BLD: 7.81 K/UL — SIGNIFICANT CHANGE UP (ref 3.8–10.5)
WBC # FLD AUTO: 7.81 K/UL — SIGNIFICANT CHANGE UP (ref 3.8–10.5)

## 2024-09-04 PROCEDURE — 99232 SBSQ HOSP IP/OBS MODERATE 35: CPT

## 2024-09-04 RX ORDER — SULFAMETHOXAZOLE AND TRIMETHOPRIM 800; 160 MG/1; MG/1
1 TABLET ORAL
Qty: 8 | Refills: 0
Start: 2024-09-04 | End: 2024-09-07

## 2024-09-04 RX ORDER — FUROSEMIDE 40 MG
1 TABLET ORAL
Qty: 30 | Refills: 0
Start: 2024-09-04 | End: 2024-10-03

## 2024-09-04 RX ORDER — ZOLPIDEM TARTRATE 5 MG/1
1 TABLET, FILM COATED ORAL
Qty: 7 | Refills: 0
Start: 2024-09-04 | End: 2024-09-10

## 2024-09-04 RX ORDER — LACTULOSE 10 G
15 PACKET (EA) ORAL
Qty: 450 | Refills: 0
Start: 2024-09-04 | End: 2024-10-03

## 2024-09-04 RX ORDER — SPIRONOLACTONE 25 MG/1
25 TABLET, FILM COATED ORAL DAILY
Refills: 0 | Status: DISCONTINUED | OUTPATIENT
Start: 2024-09-04 | End: 2024-09-05

## 2024-09-04 RX ORDER — FUROSEMIDE 40 MG
20 TABLET ORAL DAILY
Refills: 0 | Status: DISCONTINUED | OUTPATIENT
Start: 2024-09-04 | End: 2024-09-05

## 2024-09-04 RX ORDER — LACTULOSE 10 G
10 PACKET (EA) ORAL DAILY
Refills: 0 | Status: DISCONTINUED | OUTPATIENT
Start: 2024-09-04 | End: 2024-09-05

## 2024-09-04 RX ORDER — SPIRONOLACTONE 25 MG/1
1 TABLET, FILM COATED ORAL
Qty: 30 | Refills: 0
Start: 2024-09-04 | End: 2024-10-03

## 2024-09-04 RX ORDER — MEROPENEM 500 MG/10ML
1000 INJECTION, POWDER, FOR SOLUTION INTRAVENOUS ONCE
Refills: 0 | Status: COMPLETED | OUTPATIENT
Start: 2024-09-04 | End: 2024-09-04

## 2024-09-04 RX ORDER — MEROPENEM 500 MG/10ML
1000 INJECTION, POWDER, FOR SOLUTION INTRAVENOUS EVERY 8 HOURS
Refills: 0 | Status: DISCONTINUED | OUTPATIENT
Start: 2024-09-04 | End: 2024-09-05

## 2024-09-04 RX ORDER — MEROPENEM 500 MG/10ML
INJECTION, POWDER, FOR SOLUTION INTRAVENOUS
Refills: 0 | Status: DISCONTINUED | OUTPATIENT
Start: 2024-09-04 | End: 2024-09-04

## 2024-09-04 RX ORDER — MEROPENEM 500 MG/10ML
1 INJECTION, POWDER, FOR SOLUTION INTRAVENOUS
Refills: 0 | DISCHARGE
Start: 2024-09-04

## 2024-09-04 RX ORDER — SULFAMETHOXAZOLE/TRIMETHOPRIM 800-160 MG
1 TABLET ORAL
Qty: 8 | Refills: 0 | DISCHARGE
Start: 2024-09-04 | End: 2024-09-07

## 2024-09-04 RX ORDER — MEROPENEM 500 MG/10ML
INJECTION, POWDER, FOR SOLUTION INTRAVENOUS
Refills: 0 | Status: DISCONTINUED | OUTPATIENT
Start: 2024-09-04 | End: 2024-09-05

## 2024-09-04 RX ORDER — SODIUM PHOSPHATE, MONOBASIC, MONOHYDRATE AND SODIUM PHOSPHATE, DIBASIC ANHYDROUS 276; 142 MG/ML; MG/ML
15 INJECTION, SOLUTION INTRAVENOUS ONCE
Refills: 0 | Status: COMPLETED | OUTPATIENT
Start: 2024-09-04 | End: 2024-09-04

## 2024-09-04 RX ADMIN — Medication 15 MILLIGRAM(S): at 09:05

## 2024-09-04 RX ADMIN — MAGNESIUM OXIDE TAB 400 MG (240 MG ELEMENTAL MG) 400 MILLIGRAM(S): 400 (240 MG) TAB at 16:12

## 2024-09-04 RX ADMIN — Medication 15 MILLIGRAM(S): at 09:35

## 2024-09-04 RX ADMIN — SODIUM PHOSPHATE, MONOBASIC, MONOHYDRATE AND SODIUM PHOSPHATE, DIBASIC ANHYDROUS 62.5 MILLIMOLE(S): 276; 142 INJECTION, SOLUTION INTRAVENOUS at 11:53

## 2024-09-04 RX ADMIN — Medication 2000 UNIT(S): at 09:05

## 2024-09-04 RX ADMIN — Medication 2 MILLIGRAM(S): at 05:16

## 2024-09-04 RX ADMIN — PIPERACILLIN SODIUM AND TAZOBACTAM SODIUM 25 GRAM(S): 3; .375 INJECTION, POWDER, FOR SOLUTION INTRAVENOUS at 05:01

## 2024-09-04 RX ADMIN — Medication 15 MILLIGRAM(S): at 14:40

## 2024-09-04 RX ADMIN — MAGNESIUM OXIDE TAB 400 MG (240 MG ELEMENTAL MG) 400 MILLIGRAM(S): 400 (240 MG) TAB at 09:05

## 2024-09-04 RX ADMIN — Medication 15 MILLIGRAM(S): at 18:12

## 2024-09-04 RX ADMIN — Medication 10 GRAM(S): at 13:02

## 2024-09-04 RX ADMIN — MEROPENEM 1000 MILLIGRAM(S): 500 INJECTION, POWDER, FOR SOLUTION INTRAVENOUS at 13:03

## 2024-09-04 RX ADMIN — Medication 20 MILLIGRAM(S): at 11:56

## 2024-09-04 RX ADMIN — Medication 15 MILLIGRAM(S): at 18:44

## 2024-09-04 RX ADMIN — Medication 2 MILLIGRAM(S): at 14:40

## 2024-09-04 RX ADMIN — MEROPENEM 1000 MILLIGRAM(S): 500 INJECTION, POWDER, FOR SOLUTION INTRAVENOUS at 21:23

## 2024-09-04 RX ADMIN — SULFAMETHOXAZOLE AND TRIMETHOPRIM 1 TABLET(S): 800; 160 TABLET ORAL at 09:05

## 2024-09-04 RX ADMIN — Medication 2 MILLIGRAM(S): at 05:01

## 2024-09-04 RX ADMIN — Medication 1 MILLIGRAM(S): at 09:06

## 2024-09-04 RX ADMIN — SULFAMETHOXAZOLE AND TRIMETHOPRIM 1 TABLET(S): 800; 160 TABLET ORAL at 21:23

## 2024-09-04 RX ADMIN — ZOLPIDEM TARTRATE 5 MILLIGRAM(S): 5 TABLET, FILM COATED ORAL at 21:22

## 2024-09-04 RX ADMIN — URSODIOL 300 MILLIGRAM(S): 500 TABLET, FILM COATED ORAL at 09:05

## 2024-09-04 NOTE — PROGRESS NOTE ADULT - ASSESSMENT
Assessment:  49M with CA with liver and lung metastases on panitumumab, HTN, recurrent polymicrobial bacteremia due to recurrent acute cholangitis in setting of malignant biliary obstruction, s/p biliary drain placement presents 8/29 for decreased drainage from his catheters and low-grade fevers.   Most recently, patient was just discharged 8/17 for Polymicrobial Bacteremia, Kleb oxytoca, Pseudomonas putida due to Recurrent Ascending Cholangitis likely due to Malignant biliary obstruction, treated with IV antibiotic and eventually completed 2-weeks of PO antibiotic on 8/26/2024  Afebrile on admission, on RA  No leukocytosis  Elevated AST/ALT in 200s, Bili 2  CTAP with Mild interval increase in size of the patient&apos;s large mass in the rightmlobe of the liver. Additional lesions are similar to the prior examination.mExtensive retroperitoneal and periportal adenopathy, as previously., Peritoneal carcinomatosis., Metastases at the lung bases. Mild-to-moderate ascites.  RVP negative  BCx 8/28 Achromobacter S Zosyn, Nirav R Fluorquinolone/Bactrim, Cefepime  BCx 8/30 NGTD  CT with mild biliary duct dilatation in the right hepatic lobe despite central CBD stent extending to the right biliary ductal system, no pneumobilia. Stent patency is to be questioned.   L Biliary Drain Cx 8/30 Steno R Fluoro/Geovanni, S Bactrim  R Biliary Drain Cx 8/30 Staph epi and Staph hemolyticus     Prior cultures:  BCx 8/1  Kleb oxytoca, Pseudomonas putida  BCx 8/4 NGTD  BCx 8/8 with Pseudomonas putida   BCx 8/12 NGTD    Antimicrobials:  piperacillin/tazobactam IVPB.. 3.375 every 8 hours (8/28 --- ) Meropenem 1G q8 (9/4 --- )    Impression:   #Recurrent GNR Bacteremia  #Achromobacter Bacteremia   #Fever   #Hx of Recurrent Ascending Cholangitis likely due to Malignant biliary obstruction  #Hx of Polymicrobial Bacteremia, Kleb oxytoca, Pseudomonas putida  #Metastatic Colon Cancer  - chemoport R chest wall without s/o infection, biliary drain exit site with some redness, but appears more irritation rather than soft tissue infection  - Blood culture with Achromobacter, no oral options based on sensitivity, limited data on the use of Ertapenem  - Biliary drainage cultures likely colonization, but will treat for Stenotrophomonas, and not CoNS  - remains afebrile, nontoxic appearing, no acute complaints     Recommendations:  - switch Zosyn to Meropenem 1G q8 in prep for discharge  - continue Bactrim DS 1tab q12 (complete 7-day course; enddate 9/8/2024)  - monitor temperature curve  - trend WBC  - side effects of antibiotic discussed, tolerating abx well so far  - Heme/onc following  - GI eval noted  - IR eval noted  - Patient is high risk for recurrent bacteremia/cholangitis given malignancy  - Spoke with Heme/Onc Dr. Garrett, will use chemoport as IV acceess for antibiotic  - [facility]: IV Meropenem 1G q8 via R chest chemoport as access, to complete 14-day course (enddate: 9/13/24)    Clinical team may change from intravenous to oral antibiotics when the following criteria are met:   1. Patient is clinically improving/stable       a)	Improved signs and symptoms of infection from initial presentation       b)	Afebrile for 24 hours       c)	Leukocytosis trending towards normal range   2. Patient is tolerating oral intake   3. Initial/repeat blood cultures are negative OR do not need to wait for preliminary blood cultures to result    N/A Assessment:  49M with CA with liver and lung metastases on panitumumab, HTN, recurrent polymicrobial bacteremia due to recurrent acute cholangitis in setting of malignant biliary obstruction, s/p biliary drain placement presents 8/29 for decreased drainage from his catheters and low-grade fevers.   Most recently, patient was just discharged 8/17 for Polymicrobial Bacteremia, Kleb oxytoca, Pseudomonas putida due to Recurrent Ascending Cholangitis likely due to Malignant biliary obstruction, treated with IV antibiotic and eventually completed 2-weeks of PO antibiotic on 8/26/2024  Afebrile on admission, on RA  No leukocytosis  Elevated AST/ALT in 200s, Bili 2  CTAP with Mild interval increase in size of the patient&apos;s large mass in the rightmlobe of the liver. Additional lesions are similar to the prior examination.mExtensive retroperitoneal and periportal adenopathy, as previously., Peritoneal carcinomatosis., Metastases at the lung bases. Mild-to-moderate ascites.  RVP negative  BCx 8/28 Achromobacter S Zosyn, Nirav R Fluorquinolone/Bactrim, Cefepime  BCx 8/30 NGTD  CT with mild biliary duct dilatation in the right hepatic lobe despite central CBD stent extending to the right biliary ductal system, no pneumobilia. Stent patency is to be questioned.   L Biliary Drain Cx 8/30 Steno R Fluoro/Geovanni, S Bactrim  R Biliary Drain Cx 8/30 Staph epi and Staph hemolyticus     Prior cultures:  BCx 8/1  Kleb oxytoca, Pseudomonas putida  BCx 8/4 NGTD  BCx 8/8 with Pseudomonas putida   BCx 8/12 NGTD    Antimicrobials:  piperacillin/tazobactam IVPB.. 3.375 every 8 hours (8/28 --- ) Meropenem 1G q8 (9/4 --- )    Impression:   #Recurrent GNR Bacteremia  #Achromobacter Bacteremia   #Fever   #Hx of Recurrent Ascending Cholangitis likely due to Malignant biliary obstruction  #Hx of Polymicrobial Bacteremia, Kleb oxytoca, Pseudomonas putida  #Metastatic Colon Cancer  - chemoport R chest wall without s/o infection, biliary drain exit site with some redness, but appears more irritation rather than soft tissue infection  - Blood culture with Achromobacter, no oral options based on sensitivity, limited data on the use of Ertapenem  - Biliary drainage cultures likely colonization, but will treat for Stenotrophomonas, and not CoNS  - remains afebrile, nontoxic appearing, no acute complaints     Recommendations:  - switch Zosyn to Meropenem 1G q8 in prep for discharge  - continue Bactrim DS 1tab q12 (complete 7-day course; enddate 9/8/2024)  - monitor temperature curve  - trend WBC  - side effects of antibiotic discussed, tolerating abx well so far  - Heme/onc following  - GI eval noted  - IR eval noted  - Patient is high risk for recurrent bacteremia/cholangitis given malignancy  - Spoke with Heme/Onc Dr. Garrett, will use chemoport as IV acceess for antibiotic  - [facility] made aware, order placed: IV Meropenem 1G q8 via R chest chemoport as access, to complete 14-day course (enddate: 9/13/24). Send weekly CBC, CMP, ESR, CRP.    Clinical team may change from intravenous to oral antibiotics when the following criteria are met:   1. Patient is clinically improving/stable       a)	Improved signs and symptoms of infection from initial presentation       b)	Afebrile for 24 hours       c)	Leukocytosis trending towards normal range   2. Patient is tolerating oral intake   3. Initial/repeat blood cultures are negative OR do not need to wait for preliminary blood cultures to result    N/A

## 2024-09-04 NOTE — PROGRESS NOTE ADULT - SUBJECTIVE AND OBJECTIVE BOX
INTERVAL HPI/OVERNIGHT EVENTS:  Patient S&E at bedside.   s/p paracentesis yesterday with 1600 cc removed   remains afebrile but tachycardic   seen by [facility] care   pt states he feels at baseline today     PAST MEDICAL & SURGICAL HISTORY:  Colon cancer      Hypertension      Metastasis to liver      Metastasis to lung      Obstructive jaundice      H/O sepsis      H/O bacteremia      History of cholangitis      History of biliary stent insertion      S/P hernia surgery      History of ablation of neoplasm of liver      S/P colon resection          FAMILY HISTORY:  FH: heart attack (Father)        VITAL SIGNS:  T(F): 97.8 (09-04-24 @ 08:27)  HR: 101 (09-04-24 @ 08:27)  BP: 107/67 (09-04-24 @ 08:27)  RR: 18 (09-04-24 @ 08:27)  SpO2: 100% (09-04-24 @ 08:27)  Wt(kg): --    PHYSICAL EXAM:    Constitutional: NAD, anxious   Eyes: EOMI, sclera-icteric  Respiratory: CTA b/l  Cardiovascular: RRR,  Gastrointestinal: soft, NT, hernia, distended   Neurological: AAOx3      MEDICATIONS  (STANDING):  cholecalciferol 2000 Unit(s) Oral daily  folic acid 1 milliGRAM(s) Oral daily  magnesium oxide 400 milliGRAM(s) Oral two times a day with meals  piperacillin/tazobactam IVPB.. 3.375 Gram(s) IV Intermittent every 8 hours  rifAXIMin 550 milliGRAM(s) Oral two times a day  trimethoprim  160 mG/sulfamethoxazole 800 mG 1 Tablet(s) Oral every 12 hours  ursodiol Capsule 300 milliGRAM(s) Oral every 12 hours    MEDICATIONS  (PRN):  acetaminophen     Tablet .. 650 milliGRAM(s) Oral every 6 hours PRN Temp greater or equal to 38C (100.4F), Mild Pain (1 - 3)  aluminum hydroxide/magnesium hydroxide/simethicone Suspension 30 milliLiter(s) Oral every 4 hours PRN Dyspepsia  benzonatate 100 milliGRAM(s) Oral every 8 hours PRN for cough  bisacodyl 5 milliGRAM(s) Oral daily PRN Constipation  morphine  - Injectable 2 milliGRAM(s) IV Push every 4 hours PRN Severe Pain (7 - 10)  morphine  IR 15 milliGRAM(s) Oral every 6 hours PRN Severe Pain (7 - 10)  ondansetron Injectable 4 milliGRAM(s) IV Push every 8 hours PRN Nausea and/or Vomiting  polyethylene glycol 3350 17 Gram(s) Oral daily PRN for constipation  zolpidem 5 milliGRAM(s) Oral at bedtime PRN Insomnia      Allergies    No Known Allergies    Intolerances        LABS:                        8.9    8.77  )-----------( 176      ( 03 Sep 2024 07:06 )             26.6     09-03    129<L>  |  95<L>  |  9   ----------------------------<  116<H>  3.5   |  27  |  0.50    Ca    8.4<L>      03 Sep 2024 07:06  Phos  1.5     09-03  Mg     1.7     09-03    TPro  6.4  /  Alb  2.4<L>  /  TBili  18.8<H>  /  DBili  14.5<H>  /  AST  275<H>  /  ALT  35  /  AlkPhos  214<H>  09-03    PT/INR - ( 03 Sep 2024 07:06 )   PT: 21.9 sec;   INR: 1.98 ratio           Urinalysis Basic - ( 03 Sep 2024 07:06 )    Color: x / Appearance: x / SG: x / pH: x  Gluc: 116 mg/dL / Ketone: x  / Bili: x / Urobili: x   Blood: x / Protein: x / Nitrite: x   Leuk Esterase: x / RBC: x / WBC x   Sq Epi: x / Non Sq Epi: x / Bacteria: x        RADIOLOGY & ADDITIONAL TESTS:  Studies reviewed.

## 2024-09-04 NOTE — DISCHARGE NOTE PROVIDER - PROVIDER TOKENS
PROVIDER:[TOKEN:[[id number]],FOLLOWUP:[1-3 days]],PROVIDER:[TOKEN:[[id number]],FOLLOWUP:[1 week]],PROVIDER:[TOKEN:[[id number]],FOLLOWUP:[1 week]],PROVIDER:[TOKEN:[[id number]],FOLLOWUP:[2 weeks]]

## 2024-09-04 NOTE — DISCHARGE NOTE PROVIDER - CARE PROVIDERS DIRECT ADDRESSES
,DirectAddress_Unknown,[email or web address],DirectAddress_Unknown

## 2024-09-04 NOTE — DISCHARGE NOTE NURSING/CASE MANAGEMENT/SOCIAL WORK - PATIENT PORTAL LINK FT
You can access the FollowMyHealth Patient Portal offered by [facility] by registering at the following website: [email or web address]. By joining [employer]’s FollowMyHealth portal, you will also be able to view your health information using other applications (apps) compatible with our system.

## 2024-09-04 NOTE — DISCHARGE NOTE PROVIDER - NSDCCPTREATMENT_GEN_ALL_CORE_FT
PRINCIPAL PROCEDURE  Procedure: Abdomen CT  Findings and Treatment: IMPRESSION:  1.   Metastatic disease with interval tendency to increase in size of the   hepatic metastases, some pulmonary metastases and abdominal   lymphadenopathy.  2.  Mild biliary duct dilatation in the right hepatic lobe despite   central CBD stent extending to the right biliary ductal system, no   pneumobilia. Stent patency is to be questioned. Stable left biliary   ductal percutaneous stent.  3. Right subhepatic percutaneous catheter without residual localized   collection but surrounding ascites. Correlate with drain output.  4. Peritoneal carcinomatosis       PRINCIPAL PROCEDURE  Procedure: Parcentensis, abdominal, initial  Findings and Treatment: Flush peritoneal drain every other day with 10 cc ns. Patient should drain the peritoneal bag as needed

## 2024-09-04 NOTE — DISCHARGE NOTE PROVIDER - CARE PROVIDER_API CALL
Panda Conde  Family Medicine  [address] [place] [address]  Phone: [phone]  Fax: [phone]  Follow Up Time: 1-3 days    Tiana Gonzalez  Medical Oncology  [address] [facility] [address]  [place] [address]  Phone: [phone]  Fax: [phone]  Follow Up Time: 1 week    Marito Mascorro  Interventional Radiology and Diagnostic Radiology  [address] [place] [address]  Phone: [phone]  Fax: [phone]  Follow Up Time: 1 week    Syd Quintero  Gastroenterology  [address] [facility] [address], [address]  [place] [address]  Phone: [phone]  Fax: [phone]  Follow Up Time: 2 weeks

## 2024-09-04 NOTE — PROGRESS NOTE ADULT - ASSESSMENT
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 Mcnair   - IR consult - right bilary drain removed  - biliary fluid 1 of 2 cultures positive for stenotrophomonas maltophilia , staph epidermitis - will d/w ID team  - repeat blood cultures 8/30 - neg  - 9/1 - added bactrim DS continue Bactrim DS 1tab q12 (complete 7-day course; enddate 9/8/2024)  - plan for IV Meropenem 1G q8 via R chest chemoport as access, to complete 14-day course (enddate: 9/13/24)    # Right biliary drain with decreased drainage   - Drains exchanged 8/22 per IR   - SCDs for now if procedure needed   - Pt had breakfast already this morning -> will need to be NPO if intervention planned   - IR consult - Dr. Mascorro   - right drain removed 8/30/24     # Hyperbilirubinemia, transaminitis, obstructive jaundice secondary to increase in size of large mass in the right lobe of the liver and progression of metastatic  colon cancer , Lung metastasis   # h/o  Hepatitis C  s/p Rx  - Trend LFTs and bilirubin   - on panitumumab (last dose 7/10/24)  - Heme/Onc consult - Dr. Arguello   -second opinion [facility] oncology consult requested Dr. Gonzalez - WIll need to obtain ERIK WT status, MMR status, BRAF status, and TMB (tumor mutational burden status), TRK fusion status, etc from previous pathology reports If tumor mutational burden level  elevated we can determine if he would be an immunotherapy candidate, this can  be done in the outpatient setting based on previous lines of therapy can consider treatment with irinotecan + mikaela, or mikaela plus chemo doublet, cap/mikaela, trifluridine-tipiracil, regorafenib, fruquitinib etc but will discuss more when all records available and pt follows in our office  - Bili 19--> 15--> 13--> 14.9--> 15--> 18--> 19.6  -  AFP  neg - h/o hepatitis C   - IR consult - not an  candidate for large liver  mass Y-90 radiation   - immunoglobulin panel -  wnl   - no role of ursodiol  - will stop    # Moderate   ascites , Hypervolumic hyponatremia  -  IR  consult   - GI consult - Dr. Quintero   - s/p  IV albumin with IV lasix will stop 9/3 due to hyponatremia  - monitor Na, daily weight  - 8/15 - s/p 150 cc paracentesis , cytology neg for malignant cells  - repeat Us abd - moderate ascites   - 9/3 s/p 1.6 L paracentesis  - 9/4 - restart low dose of lasix 20 and spironolactone 25  due to low bordeline BP  -  SSAG > 1.1 consistent with portal hypertension    #  Hypoalbuminemia   - Albumin 2.0 --> 2.2 --> 2.4  - c/w IV albumin  - Nutrition consult  - add supplements    # Coagulopathy  - INR > 2--> 1.9--> 2.0 --> 1.8  - 8/31 - vit K 2.5 mg x1 dose  - 9/2 - vit K 5 mg     #  Abdominal pain  - stop dialudid - hepatic excretion   - trial of morphine PO prn with IV morphine prn    # Insomnia  - ambien    DVT ppx: SCDs for now -> advance to pharmacological if no planned procedures   Code status: Full code   Emergency contact: Pt will update his wife    Dispo - IV abx, ID team follows ,  oncology [facility] team, d/c planning with IV abx via mediport melissa   see discharge note

## 2024-09-04 NOTE — DISCHARGE NOTE PROVIDER - NSDCMRMEDTOKEN_GEN_ALL_CORE_FT
benzonatate 100 mg oral capsule: 1 cap(s) orally every 8 hours as needed for  cough  bisacodyl 5 mg oral delayed release tablet: 1 tab(s) orally once a day As needed Constipation  cholecalciferol 50 mcg (2000 intl units) oral tablet: 1 tab(s) orally once a day (at bedtime)  folic acid 1 mg oral tablet: 1 tab(s) orally once a day  furosemide 20 mg oral tablet: 1 tab(s) orally once a day  lactulose 10 g/15 mL oral syrup: 15 milliliter(s) orally once a day hold if more then 3 bowel movements a day  magnesium oxide 400 mg oral tablet: 1 tab(s) orally 2 times a day (with meals)  meropenem: 1 gram(s) intravenous every 8 hours until 9/13/24  morphine 15 mg oral tablet: 1 tab(s) orally every 4 hours as needed for Severe Pain (7 - 10) MDD: 90  polyethylene glycol 3350 oral powder for reconstitution: 17 gram(s) orally once a day as needed for  constipation  rifAXIMin 550 mg oral tablet: 1 tab(s) orally 2 times a day  spironolactone 25 mg oral tablet: 1 tab(s) orally once a day (at bedtime)  sulfamethoxazole-trimethoprim 800 mg-160 mg oral tablet: 1 tab(s) orally every 12 hours  zolpidem 5 mg oral tablet: 1 tab(s) orally once a day (at bedtime) as needed for Insomnia MDD: 5

## 2024-09-04 NOTE — PROGRESS NOTE ADULT - SUBJECTIVE AND OBJECTIVE BOX
Subjective:  Chief complain :  Fever      HPI:  48 y/o M with PMH metastatic colon CA with liver and lung metastases on panitumumab (last dose 7/10/24) who follows at [facility], h/o  Hepatitis C, s/p Rx ,  HTN, obstructive jaundice s/p biliary drain, sepsis, bacteremia, UTI, cholangitis, biloma s/p IR drain placement presents with fever. Pt states that he had a Tmax of 101.8F at home. He spoke to Dr. Michaels who advised him to come to the hosptial. He reports nausea and decreased drainage of right biliary drain (about 15 ml/day) when compared to left biliary drain (about 150-200 ml/day). Denies chills, chest pain, URI symptoms, cough, shortness of breath, chest pain, abdominal pain, vomiting, diarrhea, constipation, burning on urination. Please refer to recent admission below, pt was discharged on Levaquin and Flagyl which he completed yesterday. Pt was supposed to start chemotherapy and immunotherapy for his cancer 3 weeks ago but has been hospitalized.    Prior admission:   - 8/17/24: sepsis secondary to klebsiella oxytoca bacteremia, UTI with citrobacter, pseudomonas bacteremia, cholangitis d/t malignant biliary obstruction, biloma s/p IR drain placement, progression of metastatic disease -> discharged on Levaquin and Flagyl     ER course: HR . Labs: Hb 10.3 (baseline ~9), neutrophils 82.7%, INR 2.33, lactate 2.7 -> 1.8, glucose 124, albumin 2.0, total bilirubin 19.1, , . UA: unable to be performed due to color interference. RVP negative. EKG: Sinus tachycardia with  bpm, incomplete RBBB,  ms, no ST changes (personally reviewed).     Imaging:   - CT abd/pelvis: 1. Mild interval increase in size of the patient's large mass in the right  lobe of the liver. Additional lesions are similar to the prior examination. 2. Extensive retroperitoneal and periportal adenopathy, as previously. 3. Peritoneal carcinomatosis. 4. Metastases at the lung bases. 5. Mild-to-moderate ascites. 6. Cirrhosis.  - CXR: RLL infiltrate, small bilateral pleural effusions, no pneumothorax (personally reviewed).     Pt was given Meropenem, Zosyn, Vancomycin, 3100 ml of NS, Dilaudid, Oxycodone, IV tylenol. He was admitted to med/surg for further management.        8/30  -  Patient seen and examined at bedside earlier today, afebrile, denies cp, dyspnea, abdominal pain only at night, tolerating some po intake, + bad taste, plan discussed  8/31- + epigastric abdominal pain, denies nausea, vomiting , poor po intake, + abdominal distension, plan discussed  9/1 - + epigastric pain controlled , denies nausea, cp, dyspnea, cough, + abdominal distension, + swelling hands, tolerating some po intake  9/2- pain controlled, abdominal distension better, denies cp, dyspnea, afebrile, tolerating more po intake, plan discussed  9/3 - s/p 1.6 L paracentesis, feels better, + abdominal distention persist, afebrile, tolerating IV abx, plan discussed  9/4 - feels better, + gen weakness, some confusion at times, + abdominal distension better, tolerating more po intake, plan for discharge melissa discussed    Review of system- Rest of the review of system are negative except mentioned in HPI    Vital sings reviewed for last 24 h  T(C): 36.8 (09-04-24 @ 15:42), Max: 37.2 (09-03-24 @ 20:42)  T(F): 98.3 (09-04-24 @ 15:42), Max: 98.9 (09-03-24 @ 20:42)  HR: 103 (09-04-24 @ 15:42) (96 - 114)  BP: 104/79 (09-04-24 @ 15:42) (103/69 - 129/89)  RR: 17 (09-04-24 @ 15:42) (17 - 18)  SpO2: 98% (09-04-24 @ 15:42) (97% - 100%)      Physical exam:   General : NAD, appear to be of stated age , well groomed   NERVOUS SYSTEM:  Alert & Oriented X3, non- focal exam, Motor Strength 5/5 B/L upper and lower extremities; DTRs 2+ intact and symmetric  HEAD:  Atraumatic, Normocephalic  EYES: EOMI, PERRLA, conjunctiva and sclera clear  HEENT: Moist mucous membranes, Supple neck , No JVD  CHEST: BS decreased at bases bilaterally; No rales, no rhonchi, no wheezing  HEART: Regular rate and rhythm; No murmurs, no rubs or gallops  ABDOMEN: Soft, Non-tender, + distended; + ascites ,  Bowel sounds present, no guarding , no peritoneal irritation , + left sided biliary drain  GENITOURINARY- Voiding, no suprapubic tenderness  EXTREMITIES:  2+ Peripheral Pulses, No clubbing, cyanosis,   edema  MUSCULOSKELETAL:- No muscle tenderness, Muscle tone normal, No joint tenderness, no Joint swelling,  Joint ROM –normal   SKIN-no rash, no lesion    Labs radiologic and other test : all reviewed and interpreted :                           8.9    7.81  )-----------( 173      ( 04 Sep 2024 08:35 )             27.0     09-04    130<L>  |  94<L>  |  8   ----------------------------<  103<H>  3.7   |  27  |  0.44<L>    Ca    8.4<L>      04 Sep 2024 08:35  Phos  1.7     09-04  Mg     1.9     09-04    TPro  5.9<L>  /  Alb  2.1<L>  /  TBili  19.6<H>  /  DBili  14.8<H>  /  AST  260<H>  /  ALT  37  /  AlkPhos  182<H>  09-04        LIVER FUNCTIONS - ( 04 Sep 2024 08:35 )  Alb: 2.1 g/dL / Pro: 5.9 gm/dL / ALK PHOS: 182 U/L / ALT: 37 U/L / AST: 260 U/L / GGT: x           Ammonia, Serum: 38 umol/L (09-04-24 @ 08:35)  Ammonia, Serum: 44 umol/L (09-03-24 @ 07:06)  Ammonia, Serum: 46 umol/L (09-02-24 @ 06:00)          Xray Chest 1 View-PORTABLE IMMEDIATE (08.28.24 @ 22:09) >    IMPRESSION: Right superior mediastinal mass somewhat increased. Pulmonary   metastases again noted. Small infiltrate off right hilum is presently   suggested.         CT Abdomen and Pelvis w/ IV Cont (08.28.24 @ 23:51) >  IMPRESSION:  1.   Metastatic disease with interval tendency to increase in size of the   hepatic metastases, some pulmonary metastases and abdominal   lymphadenopathy.    2.  Mild biliary duct dilatation in the right hepatic lobe despite   central CBD stent extending to the right biliary ductal system, no   pneumobilia. Stent patency is to be questioned. Stable left biliary   ductal percutaneous stent.    3. Right subhepatic percutaneous catheter without residual localized   collection but surrounding ascites. Correlate with drain output.    4. Peritoneal carcinomatosis        RECENT CULTURES:  Culture - Blood (08.28.24 @ 21:48)    -  Blood PCR Panel: NEG   -  Trimethoprim/Sulfamethoxazole: R >2/38   -  Tobramycin: I 8   -  Piperacillin/Tazobactam: S <=8   Gram Stain:   Growth in aerobic bottle: Gram Negative Rods   -  Amikacin: I 32   -  Aztreonam: R >16   -  Cefepime: R >16   -  Meropenem: S 2   -  Levofloxacin: R >4   -  Gentamicin: I 8   -  Ceftriaxone: I 32   -  Ciprofloxacin: R >2   Specimen Source: .Blood None   Organism: Blood Culture PCR   Organism: Achromobacter xylosoxidans   Culture Results:   Growth in aerobic bottle: Achromobacter xylosoxidans  Direct identification is available within approximately 3-5  hours either by Blood Panel Multiplexed PCR or Direct  MALDI-TOF. Details: [email or web address]   Organism Identification: Blood Culture PCR  Achromobacter xylosoxidans   Method Type: PCR   Method Type: AIDAN    Culture - Blood (08.30.24 @ 12:17)    Specimen Source: .Blood None   Culture Results:   No growth at 48 Hours    Culture - Body Fluid with Gram Stain (08.30.24 @ 07:50)    -  Oxacillin: R >2   -  Oxacillin: R >2   -  Penicillin: R 2   -  Penicillin: R >8   -  Rifampin: S <=1 Should not be used as monotherapy   -  Rifampin: R >2 Should not be used as monotherapy   -  Tetracycline: S <=1   -  Tetracycline: R >8   -  Vancomycin: S 1   -  Vancomycin: S 1   -  Trimethoprim/Sulfamethoxazole: S <=0.5/9.5   -  Trimethoprim/Sulfamethoxazole: R >2/38   Gram Stain:   No polymorphonuclear cells seen per low power field  No organisms seen per oil power field   -  Clindamycin: R >4   -  Clindamycin: R <=0.25 This isolate is presumed to be clindamycin resistant based on detection of inducible resistance. Clindamycin may still be effective in some patients.   -  Erythromycin: R >4   -  Erythromycin: R >4   -  Gentamicin: S <=1 Should not be used as monotherapy   -  Gentamicin: S <=1 Should not be used as monotherapy   Specimen Source: Body Fluid RIGHT BILIARY DRAIN   Culture Results:   Rare Staphylococcus epidermidis  Rare Staphylococcus haemolyticus   Organism Identification: Staphylococcus haemolyticus  Staphylococcus epidermidis   Organism: Staphylococcus epidermidis   Organism: Staphylococcus haemolyticus   Method Type: AIDAN   Method Type: AIDAN    Culture - Body Fluid with Gram Stain (08.30.24 @ 07:43)    -  Levofloxacin: R >4   Gram Stain:   No polymorphonuclear leukocytes seen per low power field  Moderate Gram Negative Rods seen per oil power field   -  Minocycline: R   -  Trimethoprim/Sulfamethoxazole: S 2/38   Specimen Source: Body Fluid LEFT BILIARY DRAIN   Culture Results:   Numerous Stenotrophomonas maltophilia   Organism Identification: Stenotrophomonas maltophilia  Stenotrophomonas maltophilia   Organism: Stenotrophomonas maltophilia   Organism: Stenotrophomonas maltophilia   Method Type: AIDAN   Method Type: KB          Cardiac testing : reviewed   EKG      12 Lead ECG (08.28.24 @ 21:31) >  Ventricular Rate 128 BPM  QTC Calculation(Bazett) 481 ms  Sinus tachycardia  Incomplete right bundle branch block  ST & T wave abnormality, consider anterior ischemia  Abnormal ECG    Procedures :     Devices:     MEDICATIONS  (STANDING):  albumin human 25% IVPB 100 milliLiter(s) IV Intermittent every 12 hours  cholecalciferol 2000 Unit(s) Oral daily  folic acid 1 milliGRAM(s) Oral daily  furosemide   Injectable 20 milliGRAM(s) IV Push every 12 hours  magnesium oxide 400 milliGRAM(s) Oral two times a day with meals  piperacillin/tazobactam IVPB.. 3.375 Gram(s) IV Intermittent every 8 hours  rifAXIMin 550 milliGRAM(s) Oral two times a day  trimethoprim  160 mG/sulfamethoxazole 800 mG 1 Tablet(s) Oral every 12 hours  ursodiol Capsule 300 milliGRAM(s) Oral every 12 hours    MEDICATIONS  (PRN):  acetaminophen     Tablet .. 650 milliGRAM(s) Oral every 6 hours PRN Temp greater or equal to 38C (100.4F), Mild Pain (1 - 3)  aluminum hydroxide/magnesium hydroxide/simethicone Suspension 30 milliLiter(s) Oral every 4 hours PRN Dyspepsia  benzonatate 100 milliGRAM(s) Oral every 8 hours PRN for cough  bisacodyl 5 milliGRAM(s) Oral daily PRN Constipation  morphine  - Injectable 2 milliGRAM(s) IV Push every 4 hours PRN Severe Pain (7 - 10)  morphine  IR 15 milliGRAM(s) Oral every 6 hours PRN Severe Pain (7 - 10)  ondansetron Injectable 4 milliGRAM(s) IV Push every 8 hours PRN Nausea and/or Vomiting  polyethylene glycol 3350 17 Gram(s) Oral daily PRN for constipation  zolpidem 5 milliGRAM(s) Oral at bedtime PRN Insomnia

## 2024-09-04 NOTE — PROGRESS NOTE ADULT - ASSESSMENT
49-year-old male with a significant history of asthma and colon cancer, initially diagnosed in 2019. He underwent neoadjuvant chemotherapy followed by surgical resection and adjuvant treatment. In 2022, he was found to have progressive disease with metastasis to the liver and lung, confirmed via biomarkers.    # metastatic colon ca  The patient has undergone multiple lines of therapy and is currently on panitumumab under the care of Dr. Blood.   He has had several hospitalizations and has experienced recurrent biliomas, which have been drained multiple times.   Recently, he has been unable to complete systemic therapy due to complications.  CT a/p with increased in hepatic mets and pulm mets w/ abd LAD, mild biliary duct dilatation in right hepatic lobe despite central cbd stent and peritoneal carcinomatosis   bili 14.9-->15.9-->18.8   IR consulted for evaluation of drain - right drain removed 8/30  pt's wife d/w Dr Blood 8/30.  no further plans for anticancer therapy from Onc perspective   palliative care following.  GOC conversations ongoing  currently full code.    Pt hoping for clinical trials but unlikely to be accepted given current bili and clinical status- [place] [facility] [place]     # GNR bacteremia   biloma drain removed 8/30  Continue the current antibiotic regimen for Klebsiella oxytoca as previously recommended.- ID following - now on ZOSYN and bactrim  BCx 8/30 - negative   GI eval noted.  no role for ERCP       will follow daily   will update [facility]

## 2024-09-04 NOTE — DISCHARGE NOTE PROVIDER - ATTENDING DISCHARGE PHYSICAL EXAMINATION:
Gen nad  head ncat  ent perrl  neck supple  chest cta  cvs s1s2  abd soft + stephanie draine  ext trace edema  Abd soft + STEPHANIE

## 2024-09-04 NOTE — PROGRESS NOTE ADULT - SUBJECTIVE AND OBJECTIVE BOX
Date of Service:09-04-24 @ 11:46  Interval History/ROS: Afebrile overnight. Patient assessed at bedside this morning. Denied any acute complaints.      REVIEW OF SYSTEMS  [  ] ROS unobtainable because:    [ x ] All other systems negative except as noted below    Constitutional:  [ ] fever [ ] chills  [ ] weight loss  [ ]night sweat  [ ]poor appetite/PO intake [ ]fatigue   Skin:  [ ] rash [ ] phlebitis	  Eyes: [ ] icterus [ ] pain  [ ] discharge	  ENMT: [ ] sore throat  [ ] thrush [ ] ulcers [ ] exudates [ ]anosmia  Respiratory: [ ] dyspnea [ ] hemoptysis [ ] cough [ ] sputum	  Cardiovascular:  [ ] chest pain [ ] palpitations [ ] edema	  Gastrointestinal:  [ ] nausea [ ] vomiting [ ] diarrhea [ ] constipation [ ] pain	  Genitourinary:  [ ] dysuria [ ] frequency [ ] hematuria [ ] discharge [ ] flank pain  [ ] incontinence  Musculoskeletal:  [ ] myalgias [ ] arthralgias [ ] arthritis  [ ] back pain  Neurological:  [ ] headache [ ] weakness [ ] seizures  [ ] confusion/altered mental status    Allergies  No Known Allergies        ANTIMICROBIALS:    meropenem Injectable    rifAXIMin 550 two times a day  trimethoprim  160 mG/sulfamethoxazole 800 mG 1 every 12 hours        OTHER MEDS: MEDICATIONS  (STANDING):  acetaminophen     Tablet .. 650 every 6 hours PRN  aluminum hydroxide/magnesium hydroxide/simethicone Suspension 30 every 4 hours PRN  benzonatate 100 every 8 hours PRN  bisacodyl 5 daily PRN  furosemide    Tablet 20 daily  lactulose Syrup 10 daily  morphine  - Injectable 2 every 4 hours PRN  morphine  IR 15 every 6 hours PRN  ondansetron Injectable 4 every 8 hours PRN  polyethylene glycol 3350 17 daily PRN  spironolactone 25 daily  ursodiol Capsule 300 every 12 hours  zolpidem 5 at bedtime PRN      Vital Signs Last 24 Hrs  T(F): 98.3 (09-04-24 @ 08:53), Max: 100 (09-02-24 @ 21:26)    Vital Signs Last 24 Hrs  HR: 103 (09-04-24 @ 08:53) (99 - 114)  BP: 103/69 (09-04-24 @ 08:53) (103/69 - 129/89)  RR: 18 (09-04-24 @ 08:53)  SpO2: 99% (09-04-24 @ 08:53) (97% - 100%)  Wt(kg): --    EXAM:    Constitutional:  well preserved, comfortable  Head/Eyes: +icterus  LUNGS:  CTA  CVS:  regular rhythm  Abd:  soft, non-tender; +distended +drainage  Ext:  no edema  Vascular:  IV site no erythema tenderness or discharge  Neuro: AAO X 3, non- focal    Labs:                        8.9    7.81  )-----------( 173      ( 04 Sep 2024 08:35 )             27.0     09-04    130<L>  |  94<L>  |  8   ----------------------------<  103<H>  3.7   |  27  |  0.44<L>    Ca    8.4<L>      04 Sep 2024 08:35  Phos  1.7     09-04  Mg     1.9     09-04    TPro  5.9<L>  /  Alb  2.1<L>  /  TBili  19.6<H>  /  DBili  14.8<H>  /  AST  260<H>  /  ALT  37  /  AlkPhos  182<H>  09-04      WBC Trend:  WBC Count: 7.81 (09-04-24 @ 08:35)  WBC Count: 8.77 (09-03-24 @ 07:06)  WBC Count: 7.88 (09-02-24 @ 06:00)  WBC Count: 6.98 (09-01-24 @ 06:48)      Creatine Trend:  Creatinine: 0.44 (09-04)  Creatinine: 0.50 (09-03)  Creatinine: 0.45 (09-02)  Creatinine: 0.35 (09-01)      Liver Biochemical Testing Trend:  Alanine Aminotransferase (ALT/SGPT): 37 (09-04)  Alanine Aminotransferase (ALT/SGPT): 35 (09-03)  Alanine Aminotransferase (ALT/SGPT): 34 (09-02)  Alanine Aminotransferase (ALT/SGPT): 32 (09-01)  Alanine Aminotransferase (ALT/SGPT): 29 (08-31)  Aspartate Aminotransferase (AST/SGOT): 260 (09-04-24 @ 08:35)  Aspartate Aminotransferase (AST/SGOT): 275 (09-03-24 @ 07:06)  Aspartate Aminotransferase (AST/SGOT): 267 (09-02-24 @ 06:00)  Aspartate Aminotransferase (AST/SGOT): 267 (09-01-24 @ 06:48)  Aspartate Aminotransferase (AST/SGOT): 242 (08-31-24 @ 06:34)  Bilirubin Direct: 14.8 (09-04)  Bilirubin Total: 19.6 (09-04)  Bilirubin Direct: 14.5 (09-03)  Bilirubin Total: 18.8 (09-03)  Bilirubin Direct: 12.6 (09-02)      Trend LDH      Urinalysis Basic - ( 04 Sep 2024 08:35 )    Color: x / Appearance: x / SG: x / pH: x  Gluc: 103 mg/dL / Ketone: x  / Bili: x / Urobili: x   Blood: x / Protein: x / Nitrite: x   Leuk Esterase: x / RBC: x / WBC x   Sq Epi: x / Non Sq Epi: x / Bacteria: x        MICROBIOLOGY:        Culture - Fungal, Body Fluid (collected 03 Sep 2024 12:00)  Source: Peritoneal Peritoneal Fluid  Preliminary Report:    Testing in progress    Culture - Body Fluid with Gram Stain (collected 03 Sep 2024 12:00)  Source: Ascites Fl Ascites Fluid    Culture - Urine (collected 01 Sep 2024 12:23)  Source: Clean Catch Clean Catch (Midstream)  Final Report:    <10,000 CFU/mL Normal Urogenital Gwendolyn    Culture - Blood (collected 30 Aug 2024 12:23)  Source: .Blood None  Preliminary Report:    No growth at 4 days    Culture - Blood (collected 30 Aug 2024 12:17)  Source: .Blood None  Preliminary Report:    No growth at 4 days    Culture - Body Fluid with Gram Stain (collected 30 Aug 2024 07:50)  Source: Body Fluid RIGHT BILIARY DRAIN  Final Report:    Rare Staphylococcus epidermidis    Rare Staphylococcus haemolyticus  Organism: Staphylococcus haemolyticus  Staphylococcus epidermidis  Organism: Staphylococcus epidermidis    Sensitivities:      -  Clindamycin: R >4      -  Oxacillin: R >2      -  Gentamicin: S <=1 Should not be used as monotherapy      -  Vancomycin: S 1      -  Tetracycline: S <=1      Method Type: AIDAN      -  Penicillin: R 2      -  Rifampin: R >2 Should not be used as monotherapy      -  Erythromycin: R >4      -  Trimethoprim/Sulfamethoxazole: R >2/38  Organism: Staphylococcus haemolyticus    Sensitivities:      -  Clindamycin: R <=0.25 This isolate is presumed to be clindamycin resistant based on detection of inducible resistance. Clindamycin may still be effective in some patients.      -  Oxacillin: R >2      -  Gentamicin: S <=1 Should not be used as monotherapy      -  Vancomycin: S 1      -  Tetracycline: R >8      Method Type: AIDAN      -  Penicillin: R >8      -  Rifampin: S <=1 Should not be used as monotherapy      -  Erythromycin: R >4      -  Trimethoprim/Sulfamethoxazole: S <=0.5/9.5    Culture - Body Fluid with Gram Stain (collected 30 Aug 2024 07:43)  Source: Body Fluid LEFT BILIARY DRAIN  Final Report:    Numerous Stenotrophomonas maltophilia  Organism: Stenotrophomonas maltophilia  Stenotrophomonas maltophilia  Organism: Stenotrophomonas maltophilia    Sensitivities:      -  Minocycline: R      Method Type: KB  Organism: Stenotrophomonas maltophilia    Sensitivities:      -  Levofloxacin: R >4      Method Type: AIDAN      -  Trimethoprim/Sulfamethoxazole: S 2/38    Urinalysis with Rflx Culture (collected 29 Aug 2024 00:47)    Culture - Blood (collected 28 Aug 2024 21:48)  Source: .Blood None  Final Report:    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 Details: [email or web address]  Organism: Blood Culture PCR  Achromobacter xylosoxidans  Organism: Achromobacter xylosoxidans    Sensitivities:      -  Levofloxacin: R >4      -  Tobramycin: I 8      -  Aztreonam: R >16      -  Gentamicin: I 8      -  Cefepime: R >16      -  Piperacillin/Tazobactam: S <=8      -  Ciprofloxacin: R >2      -  Ceftriaxone: I 32      Method Type: AIDAN      -  Meropenem: S 2      -  Amikacin: I 32      -  Trimethoprim/Sulfamethoxazole: R >2/38  Organism: Blood Culture PCR    Sensitivities:      Method Type: PCR      -  Blood PCR Panel: NEG    Culture - Blood (collected 28 Aug 2024 21:48)  Source: .Blood None  Final Report:    Growth in aerobic bottle: Achromobacter xylosoxidans    See previous culture [id number]    Procalcitonin: 0.52 (09-01)  Procalcitonin: 0.52 (08-31)    C-Reactive Protein: 86 (09-03)  C-Reactive Protein: 65 (09-01)  C-Reactive Protein: 70 (08-31)    RADIOLOGY:  imaging below personally reviewed

## 2024-09-04 NOTE — DISCHARGE NOTE PROVIDER - NS AS DC PROVIDER CONTACT Y/N MULTI
Yes Patient Specific Otc Recommendations (Will Not Stick From Patient To Patient): Recommend:\\nCeraVe hydrating facial cleanser\\nCeraVe moisturizer at least BID\\nSPF 30+\\n\\nNo indication for topical steroid at this time as patient is asymptomatic and presentation is very mild. Detail Level: Zone

## 2024-09-04 NOTE — DISCHARGE NOTE PROVIDER - HOSPITAL COURSE
Chief complain :  Fever    HPI:  50 y/o M with PMH metastatic colon CA with liver and lung metastases on panitumumab (last dose 7/10/24) who follows at [facility], h/o  Hepatitis C, s/p Rx ,  HTN, obstructive jaundice s/p biliary drain, sepsis, bacteremia, UTI, cholangitis, biloma s/p IR drain placement presents with fever. Pt states that he had a Tmax of 101.8F at home. He spoke to Dr. Michaels who advised him to come to the hosptial. He reports nausea and decreased drainage of right biliary drain (about 15 ml/day) when compared to left biliary drain (about 150-200 ml/day). Denies chills, chest pain, URI symptoms, cough, shortness of breath, chest pain, abdominal pain, vomiting, diarrhea, constipation, burning on urination. Please refer to recent admission below, pt was discharged on Levaquin and Flagyl which he completed yesterday. Pt was supposed to start chemotherapy and immunotherapy for his cancer 3 weeks ago but has been hospitalized.  Prior admission:   - 8/17/24: sepsis secondary to klebsiella oxytoca bacteremia, UTI with citrobacter, pseudomonas bacteremia, cholangitis d/t malignant biliary obstruction, biloma s/p IR drain placement, progression of metastatic disease -> discharged on Levaquin and Flagyl   ER course: HR . Labs: Hb 10.3 (baseline ~9), neutrophils 82.7%, INR 2.33, lactate 2.7 -> 1.8, glucose 124, albumin 2.0, total bilirubin 19.1, , . UA: unable to be performed due to color interference. RVP negative. EKG: Sinus tachycardia with  bpm, incomplete RBBB,  ms, no ST changes (personally reviewed).   CT abd/pelvis: 1. Mild interval increase in size of the patient's large mass in the right  lobe of the liver. Additional lesions are similar to the prior examination. 2. Extensive retroperitoneal and periportal adenopathy, as previously. 3. Peritoneal carcinomatosis. 4. Metastases at the lung bases. 5. Mild-to-moderate ascites. 6. Cirrhosis.   CXR: RLL infiltrate, small bilateral pleural effusions, no pneumothorax (personally reviewed).   s/p Meropenem, Zosyn, Vancomycin, 3100 ml of NS, Dilaudid, Oxycodone, IV tylenol. He was admitted to med/surg for further management.    8/30  -  Patient seen and examined at bedside earlier today, afebrile, denies cp, dyspnea, abdominal pain only at night, tolerating some po intake, + bad taste, plan discussed  8/31- + epigastric abdominal pain, denies nausea, vomiting , poor po intake, + abdominal distension, plan discussed  9/1 - + epigastric pain controlled , denies nausea, cp, dyspnea, cough, + abdominal distension, + swelling hands, tolerating some po intake  9/2- pain controlled, abdominal distension better, denies cp, dyspnea, afebrile, tolerating more po intake, plan discussed  9/3 - s/p 1.6 L paracentesis, feels better, + abdominal distention persist, afebrile, tolerating IV abx, plan discussed  9/4 - feels better, + gen weakness, some confusion at times, + abdominal distension better, tolerating more po intake, plan for discharge melissa discussed  Review of system- Rest of the review of system are negative except mentioned in HPI  Vital sings reviewed for last 24 h  T(C): 36.8 (09-04-24 @ 15:42), Max: 37.2 (09-03-24 @ 20:42)  T(F): 98.3 (09-04-24 @ 15:42), Max: 98.9 (09-03-24 @ 20:42)  HR: 103 (09-04-24 @ 15:42) (96 - 114)  BP: 104/79 (09-04-24 @ 15:42) (103/69 - 129/89)  RR: 17 (09-04-24 @ 15:42) (17 - 18)  SpO2: 98% (09-04-24 @ 15:42) (97% - 100%)  Physical exam:   General : NAD, appear to be of stated age , well groomed   NERVOUS SYSTEM:  Alert & Oriented X3, non- focal exam, Motor Strength 5/5 B/L upper and lower extremities; DTRs 2+ intact and symmetric  HEAD:  Atraumatic, Normocephalic  EYES: EOMI, PERRLA, conjunctiva and sclera clear  HEENT: Moist mucous membranes, Supple neck , No JVD  CHEST: BS decreased at bases bilaterally; No rales, no rhonchi, no wheezing  HEART: Regular rate and rhythm; No murmurs, no rubs or gallops  ABDOMEN: Soft, Non-tender, + distended; + ascites ,  Bowel sounds present, no guarding , no peritoneal irritation , + left sided biliary drain  GENITOURINARY- Voiding, no suprapubic tenderness  EXTREMITIES:  2+ Peripheral Pulses, No clubbing, cyanosis,   edema  MUSCULOSKELETAL:- No muscle tenderness, Muscle tone normal, No joint tenderness, no Joint swelling,  Joint ROM –normal   SKIN-no rash, no lesion  Labs radiologic and other test : all reviewed and interpreted :   · Assessment    50 y/o M presents with fever   # 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 Mcnair   - IR consult - right bilary drain removed  - s/p Zosyn now on meropenem  - biliary fluid 1 of 2 cultures positive for stenotrophomonas maltophilia , staph epidermitis - will d/w ID team  - repeat blood cultures 8/30 - neg  - 9/1 - added bactrim DS continue Bactrim DS 1tab q12 (complete 7-day course; enddate 9/8/2024)  - plan for IV Meropenem 1G q8 via R chest chemoport as access, to complete 14-day course (enddate: 9/13/24)  # Right biliary drain with decreased drainage   - Drains exchanged 8/22 per IR ,  right drain removed 8/30/24   # Hyperbilirubinemia, transaminitis, obstructive jaundice secondary to increase in size of large mass in the right lobe of the liver and progression of metastatic  colon cancer , Lung metastasis  # h/o  Hepatitis C  s/p Rx  - on panitumumab (last dose 7/10/24)  - Heme/Onc consult - Dr. Arguello , now [facility]  -second opinion [facility] oncology consult requested Dr. Gonzalez - WIll need to obtain ERIK WT status, MMR status, BRAF status, and TMB (tumor mutational burden status), TRK fusion status, etc from previous pathology reports If tumor mutational burden level  elevated we can determine if he would be an immunotherapy candidate, this can  be done in the outpatient setting based on previous lines of therapy can consider treatment with irinotecan + mikaela, or mikaela plus chemo doublet, cap/mikaela, trifluridine-tipiracil, regorafenib, fruquitinib etc but will discuss more when all records available and pt follows in our office  - Bili 19--> 15--> 13--> 14.9--> 15--> 18--> 19.6  -  AFP  neg - h/o hepatitis C   - IR consult - not an  candidate for large liver  mass Y-90 radiation   - immunoglobulin panel -  wnl ,  no role of ursodiol  - will stop  # Moderate   ascites , Hypervolumic hyponatremia  -  IR  consult ,  GI consult - Dr. Quintero   - s/p  IV albumin with IV lasix will stop 9/3 due to hyponatremia  - 8/15 - s/p 150 cc paracentesis , cytology neg for malignant cells,  repeat Us abd - moderate ascites   - 9/3 s/p 1.6 L paracentesis  - 9/4 - restart low dose of lasix 20 and spironolactone 25  due to low bordeline BP  -  SSAG > 1.1 consistent with portal hypertension  #  Hypoalbuminemia   - Albumin 2.0 --> 2.2 --> 2.4  - s/p  IV albumin  # Coagulopathy  - INR > 2--> 1.9--> 2.0 --> 1.8  - 8/31 - vit K 2.5 mg x1 dose, - 9/2 - s/p vit K 5 mg   #  Abdominal pain - stop dialudid - hepatic excretion ,  trial of morphine PO prn with IV morphine prn   # Insomnia - ambien  Final diagnosis, treatment plan, and follow-up recommendations were discussed and explained to the patient.   The patient was given an opportunity to ask questions concerning the diagnosis and treatment plan.   The patient acknowledged understanding of the diagnosis, treatment, and follow-up recommendations.   The patient was advised to seek urgent care upon discharge if worsening symptoms develop prior to scheduled follow-up.   Time spent on discharge included time with the patient, and also coordinating discharge care as outlined below.  Discharge note faxed to PCP with my contact information to call me back   PCP Dr. Conde  Total time spent: 50 min

## 2024-09-05 VITALS
DIASTOLIC BLOOD PRESSURE: 75 MMHG | RESPIRATION RATE: 17 BRPM | HEART RATE: 103 BPM | TEMPERATURE: 99 F | OXYGEN SATURATION: 96 % | SYSTOLIC BLOOD PRESSURE: 117 MMHG

## 2024-09-05 DIAGNOSIS — C78.7 SECONDARY MALIGNANT NEOPLASM OF LIVER AND INTRAHEPATIC BILE DUCT: ICD-10-CM

## 2024-09-05 LAB
ALBUMIN SERPL ELPH-MCNC: 2 G/DL — LOW (ref 3.3–5)
ALP SERPL-CCNC: 195 U/L — HIGH (ref 40–120)
ALT FLD-CCNC: 43 U/L — SIGNIFICANT CHANGE UP (ref 12–78)
AMMONIA BLD-MCNC: 67 UMOL/L — HIGH (ref 11–32)
ANION GAP SERPL CALC-SCNC: 7 MMOL/L — SIGNIFICANT CHANGE UP (ref 5–17)
AST SERPL-CCNC: 282 U/L — HIGH (ref 15–37)
BILIRUB DIRECT SERPL-MCNC: 14.7 MG/DL — HIGH (ref 0–0.3)
BILIRUB INDIRECT FLD-MCNC: 4.4 MG/DL — HIGH (ref 0.2–1)
BILIRUB SERPL-MCNC: 19.1 MG/DL — HIGH (ref 0.2–1.2)
BUN SERPL-MCNC: 8 MG/DL — SIGNIFICANT CHANGE UP (ref 7–23)
CALCIUM SERPL-MCNC: 8.3 MG/DL — LOW (ref 8.5–10.1)
CHLORIDE SERPL-SCNC: 93 MMOL/L — LOW (ref 96–108)
CO2 SERPL-SCNC: 28 MMOL/L — SIGNIFICANT CHANGE UP (ref 22–31)
CREAT SERPL-MCNC: 0.44 MG/DL — LOW (ref 0.5–1.3)
EGFR: 129 ML/MIN/1.73M2 — SIGNIFICANT CHANGE UP
GLUCOSE SERPL-MCNC: 96 MG/DL — SIGNIFICANT CHANGE UP (ref 70–99)
HCT VFR BLD CALC: 28.8 % — LOW (ref 39–50)
HGB BLD-MCNC: 9.6 G/DL — LOW (ref 13–17)
INR BLD: 2.2 RATIO — HIGH (ref 0.85–1.18)
MAGNESIUM SERPL-MCNC: 1.9 MG/DL — SIGNIFICANT CHANGE UP (ref 1.6–2.6)
MCHC RBC-ENTMCNC: 32.1 PG — SIGNIFICANT CHANGE UP (ref 27–34)
MCHC RBC-ENTMCNC: 33.3 GM/DL — SIGNIFICANT CHANGE UP (ref 32–36)
MCV RBC AUTO: 96.3 FL — SIGNIFICANT CHANGE UP (ref 80–100)
NT-PROBNP SERPL-SCNC: 317 PG/ML — HIGH (ref 0–125)
OSMOLALITY SERPL: 268 MOSMOL/KG — LOW (ref 275–300)
PHOSPHATE SERPL-MCNC: 2.1 MG/DL — LOW (ref 2.5–4.5)
PLATELET # BLD AUTO: 194 K/UL — SIGNIFICANT CHANGE UP (ref 150–400)
POTASSIUM SERPL-MCNC: 3.8 MMOL/L — SIGNIFICANT CHANGE UP (ref 3.5–5.3)
POTASSIUM SERPL-SCNC: 3.8 MMOL/L — SIGNIFICANT CHANGE UP (ref 3.5–5.3)
PROT SERPL-MCNC: 6.3 GM/DL — SIGNIFICANT CHANGE UP (ref 6–8.3)
PROTHROM AB SERPL-ACNC: 24.3 SEC — HIGH (ref 9.5–13)
RBC # BLD: 2.99 M/UL — LOW (ref 4.2–5.8)
RBC # FLD: 15.9 % — HIGH (ref 10.3–14.5)
SODIUM SERPL-SCNC: 128 MMOL/L — LOW (ref 135–145)
WBC # BLD: 7.95 K/UL — SIGNIFICANT CHANGE UP (ref 3.8–10.5)
WBC # FLD AUTO: 7.95 K/UL — SIGNIFICANT CHANGE UP (ref 3.8–10.5)

## 2024-09-05 PROCEDURE — 99239 HOSP IP/OBS DSCHRG MGMT >30: CPT

## 2024-09-05 PROCEDURE — 99231 SBSQ HOSP IP/OBS SF/LOW 25: CPT

## 2024-09-05 RX ADMIN — Medication 15 MILLIGRAM(S): at 03:15

## 2024-09-05 RX ADMIN — MAGNESIUM OXIDE TAB 400 MG (240 MG ELEMENTAL MG) 400 MILLIGRAM(S): 400 (240 MG) TAB at 08:34

## 2024-09-05 RX ADMIN — Medication 20 MILLIGRAM(S): at 11:42

## 2024-09-05 RX ADMIN — Medication 15 MILLIGRAM(S): at 09:03

## 2024-09-05 RX ADMIN — MEROPENEM 1000 MILLIGRAM(S): 500 INJECTION, POWDER, FOR SOLUTION INTRAVENOUS at 06:22

## 2024-09-05 RX ADMIN — Medication 10 GRAM(S): at 11:40

## 2024-09-05 RX ADMIN — Medication 15 MILLIGRAM(S): at 08:33

## 2024-09-05 RX ADMIN — Medication 2000 UNIT(S): at 11:42

## 2024-09-05 RX ADMIN — Medication 1 MILLIGRAM(S): at 11:42

## 2024-09-05 RX ADMIN — Medication 15 MILLIGRAM(S): at 02:22

## 2024-09-05 RX ADMIN — SPIRONOLACTONE 25 MILLIGRAM(S): 25 TABLET, FILM COATED ORAL at 11:42

## 2024-09-05 RX ADMIN — SULFAMETHOXAZOLE AND TRIMETHOPRIM 1 TABLET(S): 800; 160 TABLET ORAL at 11:41

## 2024-09-05 NOTE — PROGRESS NOTE ADULT - PROVIDER SPECIALTY LIST ADULT
Gastroenterology
Heme/Onc
Hospitalist
Infectious Disease
Hospitalist
Intervent Radiology
Palliative Care
Heme/Onc
Heme/Onc
Hospitalist
Infectious Disease
Heme/Onc
Hospitalist
Hospitalist
Infectious Disease

## 2024-09-05 NOTE — PROGRESS NOTE ADULT - ASSESSMENT
48yo M with metastatic colon CA, with left IR biliary drain, now s/p removal of right IR biloma drain  - IR called about leakage from old IR right biloma drain site. Pt seen at bedside, sealed with dermabond. Pt seen again this morning, no further leakage from the site

## 2024-09-05 NOTE — PROGRESS NOTE ADULT - ASSESSMENT
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 Mcnair   - IR consult - right bilary drain removed  - biliary fluid 1 of 2 cultures positive for stenotrophomonas maltophilia , staph epidermitis - will d/w ID team  - repeat blood cultures 8/30 - neg  - 9/1 - added bactrim DS continue Bactrim DS 1tab q12 (complete 7-day course; enddate 9/8/2024)  - plan for IV Meropenem 1G q8 via R chest chemoport as access, to complete 14-day course (enddate: 9/13/24)    # Right biliary drain with decreased drainage   - Drains exchanged 8/22 per IR   - SCDs for now if procedure needed   - Pt had breakfast already this morning -> will need to be NPO if intervention planned   - IR consult - Dr. Mascorro   - right drain removed 8/30/24     # Hyperbilirubinemia, transaminitis, obstructive jaundice secondary to increase in size of large mass in the right lobe of the liver and progression of metastatic  colon cancer , Lung metastasis   # h/o  Hepatitis C  s/p Rx  - Trend LFTs and bilirubin   - on panitumumab (last dose 7/10/24)  - Heme/Onc consult - Dr. Arguello   -second opinion [facility] oncology consult requested Dr. Gonzalez - WIll need to obtain ERIK WT status, MMR status, BRAF status, and TMB (tumor mutational burden status), TRK fusion status, etc from previous pathology reports If tumor mutational burden level  elevated we can determine if he would be an immunotherapy candidate, this can  be done in the outpatient setting based on previous lines of therapy can consider treatment with irinotecan + mikaela, or mikaela plus chemo doublet, cap/mikaela, trifluridine-tipiracil, regorafenib, fruquitinib etc but will discuss more when all records available and pt follows in our office  - Bili 19--> 15--> 13--> 14.9--> 15--> 18--> 19.6  -  AFP  neg - h/o hepatitis C   - IR consult - not an  candidate for large liver  mass Y-90 radiation   - immunoglobulin panel -  wnl   - no role of ursodiol  - will stop    # Moderate   ascites , Hypervolumic hyponatremia  -  IR  consult   - GI consult - Dr. Quintero   - s/p  IV albumin with IV lasix will stop 9/3 due to hyponatremia  - monitor Na, daily weight  - 8/15 - s/p 150 cc paracentesis , cytology neg for malignant cells  - repeat Us abd - moderate ascites   - 9/3 s/p 1.6 L paracentesis  - 9/4 - restart low dose of lasix 20 and spironolactone 25  due to low bordeline BP  -  SSAG > 1.1 consistent with portal hypertension    #  Hypoalbuminemia   - Albumin 2.0 --> 2.2 --> 2.4  - c/w IV albumin  - Nutrition consult  - add supplements    # Coagulopathy  - INR > 2--> 1.9--> 2.0 --> 1.8  - 8/31 - vit K 2.5 mg x1 dose  - 9/2 - vit K 5 mg     #  Abdominal pain  - stop dialudid - hepatic excretion   - trial of morphine PO prn with IV morphine prn    # Insomnia  - ambien    Optimized for DC Home. FU outpatient oncology

## 2024-09-05 NOTE — PROGRESS NOTE ADULT - SUBJECTIVE AND OBJECTIVE BOX
HOSPITALIST ATTENDING PROGRESS NOTE    Chart and meds reviewed.  Patient seen and examined.    CC: Fever    Subjective: Feels well, no fever. Tolerating po.     All other systems reviewed and found to be negative with the exception of what has been described above.    MEDICATIONS  (STANDING):  cholecalciferol 2000 Unit(s) Oral daily  folic acid 1 milliGRAM(s) Oral daily  furosemide    Tablet 20 milliGRAM(s) Oral daily  lactulose Syrup 10 Gram(s) Oral daily  magnesium oxide 400 milliGRAM(s) Oral two times a day with meals  meropenem Injectable      meropenem Injectable 1000 milliGRAM(s) IV Push every 8 hours  rifAXIMin 550 milliGRAM(s) Oral two times a day  spironolactone 25 milliGRAM(s) Oral daily  trimethoprim  160 mG/sulfamethoxazole 800 mG 1 Tablet(s) Oral every 12 hours    MEDICATIONS  (PRN):  acetaminophen     Tablet .. 650 milliGRAM(s) Oral every 6 hours PRN Temp greater or equal to 38C (100.4F), Mild Pain (1 - 3)  aluminum hydroxide/magnesium hydroxide/simethicone Suspension 30 milliLiter(s) Oral every 4 hours PRN Dyspepsia  benzonatate 100 milliGRAM(s) Oral every 8 hours PRN for cough  bisacodyl 5 milliGRAM(s) Oral daily PRN Constipation  morphine  - Injectable 2 milliGRAM(s) IV Push every 4 hours PRN Severe Pain (7 - 10)  morphine  IR 15 milliGRAM(s) Oral every 6 hours PRN Severe Pain (7 - 10)  ondansetron Injectable 4 milliGRAM(s) IV Push every 8 hours PRN Nausea and/or Vomiting  polyethylene glycol 3350 17 Gram(s) Oral daily PRN for constipation  zolpidem 5 milliGRAM(s) Oral at bedtime PRN Insomnia      Vital Signs Last 24 Hrs  T(C): 36.9 (05 Sep 2024 08:42), Max: 37.5 (04 Sep 2024 21:01)  T(F): 98.5 (05 Sep 2024 08:42), Max: 99.5 (04 Sep 2024 21:01)  HR: 107 (05 Sep 2024 08:42) (96 - 110)  BP: 105/79 (05 Sep 2024 08:42) (104/79 - 126/77)  BP(mean): --  RR: 18 (05 Sep 2024 08:42) (17 - 18)  SpO2: 96% (05 Sep 2024 08:42) (96% - 98%)    Parameters below as of 05 Sep 2024 08:42  Patient On (Oxygen Delivery Method): room air  GEN: NAD  HEENT:  pupils equal and reactive, EOMI, no oropharyngeal lesions, erythema, exudates, oral thrush  NECK:   supple, no carotid bruits  CV:  +S1, +S2, regular, no murmurs  RESP:   lungs clear to auscultation bilaterally, no wheezing, rales, rhonchi, good air entry bilaterally  GI:  abdomen soft, non-tender, non-distended, normal BS  EXT:  no clubbing, no cyanosis, no edema, no calf pain, swelling or erythema  NEURO:  AAOX3, no focal neurological deficits, follows all commands, able to move extremities spontaneously  SKIN:  no ulcers, lesions or rashes    LABS:                          9.6    7.95  )-----------( 194      ( 05 Sep 2024 07:47 )             28.8     09-05    128<L>  |  93<L>  |  8   ----------------------------<  96  3.8   |  28  |  0.44<L>    Ca    8.3<L>      05 Sep 2024 07:47  Phos  2.1     09-05  Mg     1.9     09-05    TPro  6.3  /  Alb  2.0<L>  /  TBili  19.1<H>  /  DBili  14.7<H>  /  AST  282<H>  /  ALT  43  /  AlkPhos  195<H>  09-05    LIVER FUNCTIONS - ( 05 Sep 2024 07:47 )  Alb: 2.0 g/dL / Pro: 6.3 gm/dL / ALK PHOS: 195 U/L / ALT: 43 U/L / AST: 282 U/L / GGT: x           PT/INR - ( 05 Sep 2024 07:47 )   PT: 24.3 sec;   INR: 2.20 ratio      Urinalysis Basic - ( 05 Sep 2024 07:47 )  Color: x / Appearance: x / SG: x / pH: x  Gluc: 96 mg/dL / Ketone: x  / Bili: x / Urobili: x   Blood: x / Protein: x / Nitrite: x   Leuk Esterase: x / RBC: x / WBC x   Sq Epi: x / Non Sq Epi: x / Bacteria: x

## 2024-09-05 NOTE — PROGRESS NOTE ADULT - PROBLEM SELECTOR PLAN 1
- No further leakage from old drain site after dermabond application  - Pt understands to call IR immediately if he experiences any leakage from old drain site or paracentesis access sites  - There will be some leakage around the left biliary drain d/t his ascites accumulation. Recommend dressing changes as needed

## 2024-09-05 NOTE — PROGRESS NOTE ADULT - SUBJECTIVE AND OBJECTIVE BOX
50 y/o M with PMH metastatic colon CA with liver and lung metastases on panitumumab (last dose 7/10/24) who [facility], HTN, obstructive jaundice s/p biliary drain, sepsis, bacteremia, UTI, cholangitis, biloma s/p IR drain placement presents with fever. Pt states that he had a Tmax of 101.8F at home. He spoke to Dr. Michaels who advised him to come to the hosptial. He reports nausea and decreased drainage of right biliary drain (about 15 ml/day) when compared to left biliary drain (about 150-200 ml/day). Right IR drain was removed. Pt was complaining of ascites leak from old drain entry site, sealed with dermabond last night.    Vital Signs Last 24 Hrs  T(C): 36.9 (05 Sep 2024 08:42), Max: 37.5 (04 Sep 2024 21:01)  T(F): 98.5 (05 Sep 2024 08:42), Max: 99.5 (04 Sep 2024 21:01)  HR: 107 (05 Sep 2024 08:42) (96 - 110)  BP: 105/79 (05 Sep 2024 08:42) (104/79 - 126/77)  BP(mean): --  RR: 18 (05 Sep 2024 08:42) (17 - 18)  SpO2: 96% (05 Sep 2024 08:42) (96% - 98%)    Parameters below as of 05 Sep 2024 08:42  Patient On (Oxygen Delivery Method): room air      GENERAL APPEARANCE: Well developed, in no acute distress.    LUNGS: Auscultation of the lungs revealed normal breath sounds without any other adventitious sounds or rubs.    CARDIOVASCULAR: There was a regular rate and rhythm    ABDOMEN: Soft and nontender with normal bowel sounds.     NEUROLOGIC: Alert and oriented x 3. Normal affect.       CBC                        9.6    7.95  )-----------( 194      ( 05 Sep 2024 07:47 )             28.8       Chemistry  09-05    128<L>  |  93<L>  |  8   ----------------------------<  96  3.8   |  28  |  0.44<L>    Ca    8.3<L>      05 Sep 2024 07:47  Phos  2.1     09-05  Mg     1.9     09-05    TPro  6.3  /  Alb  2.0<L>  /  TBili  19.1<H>  /  DBili  14.7<H>  /  AST  282<H>  /  ALT  43  /  AlkPhos  195<H>  09-05      PT/INR - ( 05 Sep 2024 07:47 )   PT: 24.3 sec;   INR: 2.20 ratio

## 2024-09-06 LAB — NON-GYNECOLOGICAL CYTOLOGY STUDY: SIGNIFICANT CHANGE UP

## 2024-09-08 LAB
CULTURE RESULTS: NO GROWTH — SIGNIFICANT CHANGE UP
SPECIMEN SOURCE: SIGNIFICANT CHANGE UP

## 2024-09-12 DIAGNOSIS — Z90.49 ACQUIRED ABSENCE OF OTHER SPECIFIED PARTS OF DIGESTIVE TRACT: ICD-10-CM

## 2024-09-12 DIAGNOSIS — E88.09 OTHER DISORDERS OF PLASMA-PROTEIN METABOLISM, NOT ELSEWHERE CLASSIFIED: ICD-10-CM

## 2024-09-12 DIAGNOSIS — G47.00 INSOMNIA, UNSPECIFIED: ICD-10-CM

## 2024-09-12 DIAGNOSIS — Z85.038 PERSONAL HISTORY OF OTHER MALIGNANT NEOPLASM OF LARGE INTESTINE: ICD-10-CM

## 2024-09-12 DIAGNOSIS — K83.09 OTHER CHOLANGITIS: ICD-10-CM

## 2024-09-12 DIAGNOSIS — R65.20 SEVERE SEPSIS WITHOUT SEPTIC SHOCK: ICD-10-CM

## 2024-09-12 DIAGNOSIS — C78.00 SECONDARY MALIGNANT NEOPLASM OF UNSPECIFIED LUNG: ICD-10-CM

## 2024-09-12 DIAGNOSIS — E80.7 DISORDER OF BILIRUBIN METABOLISM, UNSPECIFIED: ICD-10-CM

## 2024-09-12 DIAGNOSIS — I10 ESSENTIAL (PRIMARY) HYPERTENSION: ICD-10-CM

## 2024-09-12 DIAGNOSIS — A41.59 OTHER GRAM-NEGATIVE SEPSIS: ICD-10-CM

## 2024-09-12 DIAGNOSIS — E83.42 HYPOMAGNESEMIA: ICD-10-CM

## 2024-09-12 DIAGNOSIS — K83.1 OBSTRUCTION OF BILE DUCT: ICD-10-CM

## 2024-09-12 DIAGNOSIS — D68.9 COAGULATION DEFECT, UNSPECIFIED: ICD-10-CM

## 2024-09-12 DIAGNOSIS — Z87.891 PERSONAL HISTORY OF NICOTINE DEPENDENCE: ICD-10-CM

## 2024-09-12 DIAGNOSIS — E87.1 HYPO-OSMOLALITY AND HYPONATREMIA: ICD-10-CM

## 2024-09-12 DIAGNOSIS — R18.0 MALIGNANT ASCITES: ICD-10-CM

## 2024-09-12 DIAGNOSIS — C78.7 SECONDARY MALIGNANT NEOPLASM OF LIVER AND INTRAHEPATIC BILE DUCT: ICD-10-CM

## 2024-09-13 PROBLEM — C78.7 SECONDARY MALIGNANT NEOPLASM OF LIVER AND INTRAHEPATIC BILE DUCT: Chronic | Status: ACTIVE | Noted: 2024-08-29

## 2024-09-13 PROBLEM — Z87.898 PERSONAL HISTORY OF OTHER SPECIFIED CONDITIONS: Chronic | Status: ACTIVE | Noted: 2024-08-29

## 2024-09-13 PROBLEM — Z87.19 PERSONAL HISTORY OF OTHER DISEASES OF THE DIGESTIVE SYSTEM: Chronic | Status: ACTIVE | Noted: 2024-08-29

## 2024-09-13 PROBLEM — K83.1 OBSTRUCTION OF BILE DUCT: Chronic | Status: ACTIVE | Noted: 2024-08-29

## 2024-09-13 PROBLEM — C78.00 SECONDARY MALIGNANT NEOPLASM OF UNSPECIFIED LUNG: Chronic | Status: ACTIVE | Noted: 2024-08-29

## 2024-09-13 PROBLEM — Z86.19 PERSONAL HISTORY OF OTHER INFECTIOUS AND PARASITIC DISEASES: Chronic | Status: ACTIVE | Noted: 2024-08-29

## 2024-09-14 LAB
CULTURE RESULTS: SIGNIFICANT CHANGE UP
SPECIMEN SOURCE: SIGNIFICANT CHANGE UP

## 2024-09-16 ENCOUNTER — OUTPATIENT (OUTPATIENT)
Dept: INPATIENT UNIT | Facility: HOSPITAL | Age: 49
LOS: 1 days | Discharge: HOME | End: 2024-09-16
Payer: COMMERCIAL

## 2024-09-16 ENCOUNTER — TRANSCRIPTION ENCOUNTER (OUTPATIENT)
Age: 49
End: 2024-09-16

## 2024-09-16 VITALS
TEMPERATURE: 99 F | DIASTOLIC BLOOD PRESSURE: 70 MMHG | RESPIRATION RATE: 16 BRPM | OXYGEN SATURATION: 98 % | HEART RATE: 114 BPM | SYSTOLIC BLOOD PRESSURE: 105 MMHG | WEIGHT: 220.02 LBS | HEIGHT: 73 IN

## 2024-09-16 VITALS
RESPIRATION RATE: 16 BRPM | OXYGEN SATURATION: 99 % | TEMPERATURE: 99 F | HEART RATE: 106 BPM | DIASTOLIC BLOOD PRESSURE: 65 MMHG | SYSTOLIC BLOOD PRESSURE: 104 MMHG

## 2024-09-16 DIAGNOSIS — R18.8 OTHER ASCITES: ICD-10-CM

## 2024-09-16 DIAGNOSIS — Z98.890 OTHER SPECIFIED POSTPROCEDURAL STATES: Chronic | ICD-10-CM

## 2024-09-16 DIAGNOSIS — Z90.49 ACQUIRED ABSENCE OF OTHER SPECIFIED PARTS OF DIGESTIVE TRACT: Chronic | ICD-10-CM

## 2024-09-16 LAB
ANION GAP SERPL CALC-SCNC: 6 MMOL/L — SIGNIFICANT CHANGE UP (ref 5–17)
BUN SERPL-MCNC: 12 MG/DL — SIGNIFICANT CHANGE UP (ref 7–23)
CALCIUM SERPL-MCNC: 8.9 MG/DL — SIGNIFICANT CHANGE UP (ref 8.5–10.1)
CHLORIDE SERPL-SCNC: 92 MMOL/L — LOW (ref 96–108)
CO2 SERPL-SCNC: 26 MMOL/L — SIGNIFICANT CHANGE UP (ref 22–31)
CREAT SERPL-MCNC: 0.5 MG/DL — SIGNIFICANT CHANGE UP (ref 0.5–1.3)
EGFR: 124 ML/MIN/1.73M2 — SIGNIFICANT CHANGE UP
GLUCOSE SERPL-MCNC: 119 MG/DL — HIGH (ref 70–99)
HCT VFR BLD CALC: 30.2 % — LOW (ref 39–50)
HGB BLD-MCNC: 9.9 G/DL — LOW (ref 13–17)
INR BLD: 1.96 RATIO — HIGH (ref 0.85–1.18)
MCHC RBC-ENTMCNC: 32 PG — SIGNIFICANT CHANGE UP (ref 27–34)
MCHC RBC-ENTMCNC: 32.8 GM/DL — SIGNIFICANT CHANGE UP (ref 32–36)
MCV RBC AUTO: 97.7 FL — SIGNIFICANT CHANGE UP (ref 80–100)
PLATELET # BLD AUTO: 188 K/UL — SIGNIFICANT CHANGE UP (ref 150–400)
POTASSIUM SERPL-MCNC: 4.1 MMOL/L — SIGNIFICANT CHANGE UP (ref 3.5–5.3)
POTASSIUM SERPL-SCNC: 4.1 MMOL/L — SIGNIFICANT CHANGE UP (ref 3.5–5.3)
PROTHROM AB SERPL-ACNC: 21.7 SEC — HIGH (ref 9.5–13)
RBC # BLD: 3.09 M/UL — LOW (ref 4.2–5.8)
RBC # FLD: 15.2 % — HIGH (ref 10.3–14.5)
SODIUM SERPL-SCNC: 124 MMOL/L — LOW (ref 135–145)
WBC # BLD: 7.88 K/UL — SIGNIFICANT CHANGE UP (ref 3.8–10.5)
WBC # FLD AUTO: 7.88 K/UL — SIGNIFICANT CHANGE UP (ref 3.8–10.5)

## 2024-09-16 PROCEDURE — C1729: CPT

## 2024-09-16 PROCEDURE — 85027 COMPLETE CBC AUTOMATED: CPT

## 2024-09-16 PROCEDURE — 80048 BASIC METABOLIC PNL TOTAL CA: CPT

## 2024-09-16 PROCEDURE — 36415 COLL VENOUS BLD VENIPUNCTURE: CPT

## 2024-09-16 PROCEDURE — 85610 PROTHROMBIN TIME: CPT

## 2024-09-16 PROCEDURE — 49083 ABD PARACENTESIS W/IMAGING: CPT

## 2024-09-16 PROCEDURE — C1769: CPT

## 2024-09-17 RX ORDER — ZOLPIDEM TARTRATE 5 MG/1
1 TABLET, FILM COATED ORAL
Qty: 7 | Refills: 0
Start: 2024-09-17 | End: 2024-09-23

## 2024-09-24 ENCOUNTER — INPATIENT (INPATIENT)
Facility: HOSPITAL | Age: 49
LOS: 3 days | Discharge: HOME | DRG: 641 | End: 2024-09-28
Attending: FAMILY MEDICINE | Admitting: INTERNAL MEDICINE
Payer: COMMERCIAL

## 2024-09-24 ENCOUNTER — RESULT REVIEW (OUTPATIENT)
Age: 49
End: 2024-09-24

## 2024-09-24 VITALS — WEIGHT: 224.43 LBS | HEIGHT: 73 IN

## 2024-09-24 DIAGNOSIS — Z98.890 OTHER SPECIFIED POSTPROCEDURAL STATES: Chronic | ICD-10-CM

## 2024-09-24 DIAGNOSIS — Z90.49 ACQUIRED ABSENCE OF OTHER SPECIFIED PARTS OF DIGESTIVE TRACT: Chronic | ICD-10-CM

## 2024-09-24 DIAGNOSIS — E87.1 HYPO-OSMOLALITY AND HYPONATREMIA: ICD-10-CM

## 2024-09-24 LAB
ABO RH CONFIRMATION: SIGNIFICANT CHANGE UP
ALBUMIN SERPL ELPH-MCNC: 1.6 G/DL — LOW (ref 3.3–5)
ALP SERPL-CCNC: 393 U/L — HIGH (ref 40–120)
ALT FLD-CCNC: 40 U/L — SIGNIFICANT CHANGE UP (ref 12–78)
AMMONIA BLD-MCNC: 55 UMOL/L — HIGH (ref 11–32)
ANION GAP SERPL CALC-SCNC: 7 MMOL/L — SIGNIFICANT CHANGE UP (ref 5–17)
APPEARANCE UR: ABNORMAL
APTT BLD: 36.5 SEC — HIGH (ref 24.5–35.6)
AST SERPL-CCNC: 264 U/L — HIGH (ref 15–37)
BACTERIA # UR AUTO: NEGATIVE /HPF — SIGNIFICANT CHANGE UP
BASOPHILS # BLD AUTO: 0.02 K/UL — SIGNIFICANT CHANGE UP (ref 0–0.2)
BASOPHILS NFR BLD AUTO: 0.2 % — SIGNIFICANT CHANGE UP (ref 0–2)
BILIRUB SERPL-MCNC: 21.5 MG/DL — HIGH (ref 0.2–1.2)
BILIRUB UR-MCNC: ABNORMAL
BLD GP AB SCN SERPL QL: SIGNIFICANT CHANGE UP
BUN SERPL-MCNC: 19 MG/DL — SIGNIFICANT CHANGE UP (ref 7–23)
CALCIUM SERPL-MCNC: 8.3 MG/DL — LOW (ref 8.5–10.1)
CAST: 13 /LPF — HIGH (ref 0–4)
CHLORIDE SERPL-SCNC: 91 MMOL/L — LOW (ref 96–108)
CO2 SERPL-SCNC: 24 MMOL/L — SIGNIFICANT CHANGE UP (ref 22–31)
COLOR SPEC: SIGNIFICANT CHANGE UP
CREAT SERPL-MCNC: 0.84 MG/DL — SIGNIFICANT CHANGE UP (ref 0.5–1.3)
DIFF PNL FLD: NEGATIVE — SIGNIFICANT CHANGE UP
EGFR: 106 ML/MIN/1.73M2 — SIGNIFICANT CHANGE UP
EGFR: 106 ML/MIN/1.73M2 — SIGNIFICANT CHANGE UP
EOSINOPHIL # BLD AUTO: 0.04 K/UL — SIGNIFICANT CHANGE UP (ref 0–0.5)
EOSINOPHIL NFR BLD AUTO: 0.4 % — SIGNIFICANT CHANGE UP (ref 0–6)
GLUCOSE SERPL-MCNC: 136 MG/DL — HIGH (ref 70–99)
GLUCOSE UR QL: NEGATIVE MG/DL — SIGNIFICANT CHANGE UP
GRAN CASTS # UR COMP ASSIST: SIGNIFICANT CHANGE UP
HCT VFR BLD CALC: 30.7 % — LOW (ref 39–50)
HGB BLD-MCNC: 10.3 G/DL — LOW (ref 13–17)
HYALINE CASTS # UR AUTO: PRESENT
IMM GRANULOCYTES NFR BLD AUTO: 1 % — HIGH (ref 0–0.9)
INR BLD: 2.72 RATIO — HIGH (ref 0.85–1.16)
KETONES UR-MCNC: NEGATIVE MG/DL — SIGNIFICANT CHANGE UP
LEUKOCYTE ESTERASE UR-ACNC: ABNORMAL
LIDOCAIN IGE QN: 29 U/L — SIGNIFICANT CHANGE UP (ref 13–75)
LYMPHOCYTES # BLD AUTO: 0.74 K/UL — LOW (ref 1–3.3)
LYMPHOCYTES # BLD AUTO: 7.1 % — LOW (ref 13–44)
MAGNESIUM SERPL-MCNC: 2.2 MG/DL — SIGNIFICANT CHANGE UP (ref 1.6–2.6)
MCHC RBC-ENTMCNC: 31.8 PG — SIGNIFICANT CHANGE UP (ref 27–34)
MCHC RBC-ENTMCNC: 33.6 GM/DL — SIGNIFICANT CHANGE UP (ref 32–36)
MCV RBC AUTO: 94.8 FL — SIGNIFICANT CHANGE UP (ref 80–100)
MONOCYTES # BLD AUTO: 0.8 K/UL — SIGNIFICANT CHANGE UP (ref 0–0.9)
MONOCYTES NFR BLD AUTO: 7.7 % — SIGNIFICANT CHANGE UP (ref 2–14)
NEUTROPHILS # BLD AUTO: 8.65 K/UL — HIGH (ref 1.8–7.4)
NEUTROPHILS NFR BLD AUTO: 83.6 % — HIGH (ref 43–77)
NITRITE UR-MCNC: POSITIVE
PH UR: 5.5 — SIGNIFICANT CHANGE UP (ref 5–8)
PHOSPHATE SERPL-MCNC: 2.7 MG/DL — SIGNIFICANT CHANGE UP (ref 2.5–4.5)
PLATELET # BLD AUTO: 192 K/UL — SIGNIFICANT CHANGE UP (ref 150–400)
POTASSIUM SERPL-MCNC: 4.2 MMOL/L — SIGNIFICANT CHANGE UP (ref 3.5–5.3)
POTASSIUM SERPL-SCNC: 4.2 MMOL/L — SIGNIFICANT CHANGE UP (ref 3.5–5.3)
PROT SERPL-MCNC: 6.3 GM/DL — SIGNIFICANT CHANGE UP (ref 6–8.3)
PROT UR-MCNC: 30 MG/DL
PROTHROM AB SERPL-ACNC: 31 SEC — HIGH (ref 9.9–13.4)
RBC # BLD: 3.24 M/UL — LOW (ref 4.2–5.8)
RBC # FLD: 15 % — HIGH (ref 10.3–14.5)
RBC CASTS # UR COMP ASSIST: 10 /HPF — HIGH (ref 0–4)
SODIUM SERPL-SCNC: 122 MMOL/L — LOW (ref 135–145)
SP GR SPEC: 1.02 — SIGNIFICANT CHANGE UP (ref 1–1.03)
SQUAMOUS # UR AUTO: 5 /HPF — SIGNIFICANT CHANGE UP (ref 0–5)
TROPONIN I, HIGH SENSITIVITY RESULT: <3 NG/L — SIGNIFICANT CHANGE UP
UROBILINOGEN FLD QL: 1 MG/DL — SIGNIFICANT CHANGE UP (ref 0.2–1)
WBC # BLD: 10.35 K/UL — SIGNIFICANT CHANGE UP (ref 3.8–10.5)
WBC # FLD AUTO: 10.35 K/UL — SIGNIFICANT CHANGE UP (ref 3.8–10.5)
WBC UR QL: 0 /HPF — SIGNIFICANT CHANGE UP (ref 0–5)

## 2024-09-24 PROCEDURE — 84550 ASSAY OF BLOOD/URIC ACID: CPT

## 2024-09-24 PROCEDURE — 83735 ASSAY OF MAGNESIUM: CPT

## 2024-09-24 PROCEDURE — 88108 CYTOPATH CONCENTRATE TECH: CPT

## 2024-09-24 PROCEDURE — 88305 TISSUE EXAM BY PATHOLOGIST: CPT | Mod: 26

## 2024-09-24 PROCEDURE — 99223 1ST HOSP IP/OBS HIGH 75: CPT

## 2024-09-24 PROCEDURE — 87102 FUNGUS ISOLATION CULTURE: CPT

## 2024-09-24 PROCEDURE — 82945 GLUCOSE OTHER FLUID: CPT

## 2024-09-24 PROCEDURE — 87015 SPECIMEN INFECT AGNT CONCNTJ: CPT

## 2024-09-24 PROCEDURE — 83935 ASSAY OF URINE OSMOLALITY: CPT

## 2024-09-24 PROCEDURE — 89051 BODY FLUID CELL COUNT: CPT

## 2024-09-24 PROCEDURE — 36415 COLL VENOUS BLD VENIPUNCTURE: CPT

## 2024-09-24 PROCEDURE — 99497 ADVNCD CARE PLAN 30 MIN: CPT | Mod: 25

## 2024-09-24 PROCEDURE — 49083 ABD PARACENTESIS W/IMAGING: CPT

## 2024-09-24 PROCEDURE — 85027 COMPLETE CBC AUTOMATED: CPT

## 2024-09-24 PROCEDURE — 87070 CULTURE OTHR SPECIMN AEROBIC: CPT

## 2024-09-24 PROCEDURE — 88305 TISSUE EXAM BY PATHOLOGIST: CPT

## 2024-09-24 PROCEDURE — 84100 ASSAY OF PHOSPHORUS: CPT

## 2024-09-24 PROCEDURE — 85610 PROTHROMBIN TIME: CPT

## 2024-09-24 PROCEDURE — 99291 CRITICAL CARE FIRST HOUR: CPT

## 2024-09-24 PROCEDURE — 76705 ECHO EXAM OF ABDOMEN: CPT

## 2024-09-24 PROCEDURE — 80048 BASIC METABOLIC PNL TOTAL CA: CPT

## 2024-09-24 PROCEDURE — 83615 LACTATE (LD) (LDH) ENZYME: CPT

## 2024-09-24 PROCEDURE — 80053 COMPREHEN METABOLIC PANEL: CPT

## 2024-09-24 PROCEDURE — P9047: CPT

## 2024-09-24 PROCEDURE — 80076 HEPATIC FUNCTION PANEL: CPT

## 2024-09-24 PROCEDURE — 84300 ASSAY OF URINE SODIUM: CPT

## 2024-09-24 PROCEDURE — 97163 PT EVAL HIGH COMPLEX 45 MIN: CPT | Mod: GP

## 2024-09-24 PROCEDURE — 97116 GAIT TRAINING THERAPY: CPT | Mod: GP

## 2024-09-24 PROCEDURE — 82570 ASSAY OF URINE CREATININE: CPT

## 2024-09-24 PROCEDURE — 84157 ASSAY OF PROTEIN OTHER: CPT

## 2024-09-24 PROCEDURE — C1729: CPT

## 2024-09-24 PROCEDURE — 88108 CYTOPATH CONCENTRATE TECH: CPT | Mod: 26

## 2024-09-24 PROCEDURE — 87075 CULTR BACTERIA EXCEPT BLOOD: CPT

## 2024-09-24 PROCEDURE — 82042 OTHER SOURCE ALBUMIN QUAN EA: CPT

## 2024-09-24 PROCEDURE — 80069 RENAL FUNCTION PANEL: CPT

## 2024-09-24 PROCEDURE — 83930 ASSAY OF BLOOD OSMOLALITY: CPT

## 2024-09-24 PROCEDURE — 85730 THROMBOPLASTIN TIME PARTIAL: CPT

## 2024-09-24 RX ORDER — ACETAMINOPHEN 500 MG/5ML
650 LIQUID (ML) ORAL EVERY 6 HOURS
Refills: 0 | Status: DISCONTINUED | OUTPATIENT
Start: 2024-09-24 | End: 2024-09-28

## 2024-09-24 RX ORDER — ONDANSETRON HCL/PF 4 MG/2 ML
4 VIAL (ML) INJECTION EVERY 6 HOURS
Refills: 0 | Status: DISCONTINUED | OUTPATIENT
Start: 2024-09-24 | End: 2024-09-28

## 2024-09-24 RX ORDER — MAGNESIUM OXIDE 400 MG
400 TABLET ORAL
Refills: 0 | Status: DISCONTINUED | OUTPATIENT
Start: 2024-09-24 | End: 2024-09-28

## 2024-09-24 RX ORDER — BENZONATATE 100 MG
100 CAPSULE ORAL EVERY 8 HOURS
Refills: 0 | Status: DISCONTINUED | OUTPATIENT
Start: 2024-09-24 | End: 2024-09-28

## 2024-09-24 RX ORDER — FOLIC ACID 1 MG/1
1 TABLET ORAL DAILY
Refills: 0 | Status: DISCONTINUED | OUTPATIENT
Start: 2024-09-24 | End: 2024-09-28

## 2024-09-24 RX ORDER — LACTULOSE 10 G/15ML
10 SOLUTION ORAL DAILY
Refills: 0 | Status: DISCONTINUED | OUTPATIENT
Start: 2024-09-24 | End: 2024-09-28

## 2024-09-24 RX ORDER — BISACODYL 5 MG
5 TABLET, DELAYED RELEASE (ENTERIC COATED) ORAL EVERY 12 HOURS
Refills: 0 | Status: DISCONTINUED | OUTPATIENT
Start: 2024-09-24 | End: 2024-09-28

## 2024-09-24 RX ORDER — POLYETHYLENE GLYCOL 3350 17 G/17G
17 POWDER, FOR SOLUTION ORAL DAILY
Refills: 0 | Status: DISCONTINUED | OUTPATIENT
Start: 2024-09-24 | End: 2024-09-28

## 2024-09-24 RX ADMIN — Medication 1 GRAM(S): at 21:41

## 2024-09-24 RX ADMIN — Medication 15 MILLIGRAM(S): at 19:00

## 2024-09-24 RX ADMIN — Medication 1000 MILLILITER(S): at 15:47

## 2024-09-24 RX ADMIN — Medication 400 MILLIGRAM(S): at 20:01

## 2024-09-24 RX ADMIN — Medication 15 MILLIGRAM(S): at 23:56

## 2024-09-24 RX ADMIN — Medication 15 MILLIGRAM(S): at 23:26

## 2024-09-24 RX ADMIN — Medication 5 MILLIGRAM(S): at 20:01

## 2024-09-24 RX ADMIN — Medication 5 MILLIGRAM(S): at 21:41

## 2024-09-24 RX ADMIN — Medication 1 GRAM(S): at 16:20

## 2024-09-25 LAB
ADD ON TEST-SPECIMEN IN LAB: SIGNIFICANT CHANGE UP
ALBUMIN FLD-MCNC: 0.8 G/DL — SIGNIFICANT CHANGE UP
ALBUMIN SERPL ELPH-MCNC: 1.6 G/DL — LOW (ref 3.3–5)
ALP SERPL-CCNC: 362 U/L — HIGH (ref 40–120)
ALT FLD-CCNC: 37 U/L — SIGNIFICANT CHANGE UP (ref 12–78)
ANION GAP SERPL CALC-SCNC: 8 MMOL/L — SIGNIFICANT CHANGE UP (ref 5–17)
AST SERPL-CCNC: 260 U/L — HIGH (ref 15–37)
B PERT IGG+IGM PNL SER: ABNORMAL
BILIRUB SERPL-MCNC: 21.1 MG/DL — HIGH (ref 0.2–1.2)
BUN SERPL-MCNC: 21 MG/DL — SIGNIFICANT CHANGE UP (ref 7–23)
CALCIUM SERPL-MCNC: 8.4 MG/DL — LOW (ref 8.5–10.1)
CHLORIDE SERPL-SCNC: 93 MMOL/L — LOW (ref 96–108)
CO2 SERPL-SCNC: 23 MMOL/L — SIGNIFICANT CHANGE UP (ref 22–31)
COLOR FLD: YELLOW — SIGNIFICANT CHANGE UP
CREAT SERPL-MCNC: 1 MG/DL — SIGNIFICANT CHANGE UP (ref 0.5–1.3)
EGFR: 92 ML/MIN/1.73M2 — SIGNIFICANT CHANGE UP
EGFR: 92 ML/MIN/1.73M2 — SIGNIFICANT CHANGE UP
EOSINOPHIL # FLD: 0 % — SIGNIFICANT CHANGE UP
FLUID INTAKE SUBSTANCE CLASS: SIGNIFICANT CHANGE UP
FOLATE+VIT B12 SERBLD-IMP: 0 % — SIGNIFICANT CHANGE UP
GLUCOSE FLD-MCNC: 134 MG/DL — SIGNIFICANT CHANGE UP
GLUCOSE SERPL-MCNC: 108 MG/DL — HIGH (ref 70–99)
GRAM STN FLD: SIGNIFICANT CHANGE UP
HCT VFR BLD CALC: 31.2 % — LOW (ref 39–50)
HGB BLD-MCNC: 10.2 G/DL — LOW (ref 13–17)
INR BLD: 2.51 RATIO — HIGH (ref 0.85–1.16)
LDH SERPL L TO P-CCNC: 93 U/L — SIGNIFICANT CHANGE UP
LYMPHOCYTES # FLD: 19 % — SIGNIFICANT CHANGE UP
MCHC RBC-ENTMCNC: 31.3 PG — SIGNIFICANT CHANGE UP (ref 27–34)
MCHC RBC-ENTMCNC: 32.7 GM/DL — SIGNIFICANT CHANGE UP (ref 32–36)
MCV RBC AUTO: 95.7 FL — SIGNIFICANT CHANGE UP (ref 80–100)
MESOTHL CELL # FLD: 2 % — SIGNIFICANT CHANGE UP
MONOS+MACROS # FLD: 63 % — SIGNIFICANT CHANGE UP
NEUTROPHILS-BODY FLUID: 16 % — SIGNIFICANT CHANGE UP
NRBC # FLD: 0 % — SIGNIFICANT CHANGE UP
OTHER CELLS FLD MANUAL: 0 % — SIGNIFICANT CHANGE UP
PLATELET # BLD AUTO: 147 K/UL — LOW (ref 150–400)
POTASSIUM SERPL-MCNC: 4.2 MMOL/L — SIGNIFICANT CHANGE UP (ref 3.5–5.3)
POTASSIUM SERPL-SCNC: 4.2 MMOL/L — SIGNIFICANT CHANGE UP (ref 3.5–5.3)
PROT FLD-MCNC: 1.5 G/DL — SIGNIFICANT CHANGE UP
PROT SERPL-MCNC: 5.9 GM/DL — LOW (ref 6–8.3)
PROTHROM AB SERPL-ACNC: 29.4 SEC — HIGH (ref 9.9–13.4)
RBC # BLD: 3.26 M/UL — LOW (ref 4.2–5.8)
RBC # FLD: 14.9 % — HIGH (ref 10.3–14.5)
RCV VOL RI: 2000 /UL — HIGH (ref 0–0)
SODIUM SERPL-SCNC: 124 MMOL/L — LOW (ref 135–145)
SPECIMEN SOURCE: SIGNIFICANT CHANGE UP
TOTAL NUCLEATED CELL COUNT, BODY FLUID: 535 /UL — SIGNIFICANT CHANGE UP
TUBE TYPE: SIGNIFICANT CHANGE UP
URATE SERPL-MCNC: 5.5 MG/DL — SIGNIFICANT CHANGE UP (ref 3.4–8.8)
WBC # BLD: 8.16 K/UL — SIGNIFICANT CHANGE UP (ref 3.8–10.5)
WBC # FLD AUTO: 8.16 K/UL — SIGNIFICANT CHANGE UP (ref 3.8–10.5)

## 2024-09-25 PROCEDURE — 99232 SBSQ HOSP IP/OBS MODERATE 35: CPT

## 2024-09-25 PROCEDURE — 49083 ABD PARACENTESIS W/IMAGING: CPT

## 2024-09-25 PROCEDURE — 99233 SBSQ HOSP IP/OBS HIGH 50: CPT

## 2024-09-25 PROCEDURE — 99223 1ST HOSP IP/OBS HIGH 75: CPT

## 2024-09-25 RX ORDER — FUROSEMIDE 10 MG/ML
40 INJECTION INTRAMUSCULAR; INTRAVENOUS DAILY
Refills: 0 | Status: DISCONTINUED | OUTPATIENT
Start: 2024-09-25 | End: 2024-09-26

## 2024-09-25 RX ADMIN — Medication 5 MILLIGRAM(S): at 22:38

## 2024-09-25 RX ADMIN — LACTULOSE 10 GRAM(S): 10 SOLUTION ORAL at 09:13

## 2024-09-25 RX ADMIN — Medication 1 GRAM(S): at 21:48

## 2024-09-25 RX ADMIN — Medication 400 MILLIGRAM(S): at 17:20

## 2024-09-25 RX ADMIN — Medication 15 MILLIGRAM(S): at 06:39

## 2024-09-25 RX ADMIN — Medication 1 GRAM(S): at 13:41

## 2024-09-25 RX ADMIN — Medication 15 MILLIGRAM(S): at 06:09

## 2024-09-25 RX ADMIN — FOLIC ACID 1 MILLIGRAM(S): 1 TABLET ORAL at 09:13

## 2024-09-25 RX ADMIN — Medication 15 MILLIGRAM(S): at 22:20

## 2024-09-25 RX ADMIN — Medication 400 MILLIGRAM(S): at 09:13

## 2024-09-25 RX ADMIN — Medication 1 GRAM(S): at 06:05

## 2024-09-25 RX ADMIN — Medication 15 MILLIGRAM(S): at 17:22

## 2024-09-25 RX ADMIN — Medication 15 MILLIGRAM(S): at 21:48

## 2024-09-26 LAB
ALBUMIN SERPL ELPH-MCNC: 1.6 G/DL — LOW (ref 3.3–5)
ALP SERPL-CCNC: 378 U/L — HIGH (ref 40–120)
ALT FLD-CCNC: 35 U/L — SIGNIFICANT CHANGE UP (ref 12–78)
ANION GAP SERPL CALC-SCNC: 10 MMOL/L — SIGNIFICANT CHANGE UP (ref 5–17)
ANION GAP SERPL CALC-SCNC: 8 MMOL/L — SIGNIFICANT CHANGE UP (ref 5–17)
ANION GAP SERPL CALC-SCNC: 9 MMOL/L — SIGNIFICANT CHANGE UP (ref 5–17)
APTT BLD: 34.9 SEC — SIGNIFICANT CHANGE UP (ref 24.5–35.6)
AST SERPL-CCNC: 238 U/L — HIGH (ref 15–37)
BILIRUB DIRECT SERPL-MCNC: 18.1 MG/DL — HIGH (ref 0–0.3)
BILIRUB INDIRECT FLD-MCNC: 3.9 MG/DL — HIGH (ref 0.2–1)
BILIRUB SERPL-MCNC: 22 MG/DL — HIGH (ref 0.2–1.2)
BUN SERPL-MCNC: 24 MG/DL — HIGH (ref 7–23)
BUN SERPL-MCNC: 26 MG/DL — HIGH (ref 7–23)
BUN SERPL-MCNC: 29 MG/DL — HIGH (ref 7–23)
CALCIUM SERPL-MCNC: 8.3 MG/DL — LOW (ref 8.5–10.1)
CALCIUM SERPL-MCNC: 8.4 MG/DL — LOW (ref 8.5–10.1)
CALCIUM SERPL-MCNC: 8.6 MG/DL — SIGNIFICANT CHANGE UP (ref 8.5–10.1)
CHLORIDE SERPL-SCNC: 91 MMOL/L — LOW (ref 96–108)
CHLORIDE SERPL-SCNC: 91 MMOL/L — LOW (ref 96–108)
CHLORIDE SERPL-SCNC: 92 MMOL/L — LOW (ref 96–108)
CO2 SERPL-SCNC: 24 MMOL/L — SIGNIFICANT CHANGE UP (ref 22–31)
CO2 SERPL-SCNC: 25 MMOL/L — SIGNIFICANT CHANGE UP (ref 22–31)
CO2 SERPL-SCNC: 25 MMOL/L — SIGNIFICANT CHANGE UP (ref 22–31)
CREAT ?TM UR-MCNC: 161 MG/DL — SIGNIFICANT CHANGE UP
CREAT SERPL-MCNC: 1.18 MG/DL — SIGNIFICANT CHANGE UP (ref 0.5–1.3)
CREAT SERPL-MCNC: 1.2 MG/DL — SIGNIFICANT CHANGE UP (ref 0.5–1.3)
CREAT SERPL-MCNC: 1.43 MG/DL — HIGH (ref 0.5–1.3)
EGFR: 60 ML/MIN/1.73M2 — SIGNIFICANT CHANGE UP
EGFR: 60 ML/MIN/1.73M2 — SIGNIFICANT CHANGE UP
EGFR: 74 ML/MIN/1.73M2 — SIGNIFICANT CHANGE UP
EGFR: 74 ML/MIN/1.73M2 — SIGNIFICANT CHANGE UP
EGFR: 75 ML/MIN/1.73M2 — SIGNIFICANT CHANGE UP
EGFR: 75 ML/MIN/1.73M2 — SIGNIFICANT CHANGE UP
GLUCOSE SERPL-MCNC: 103 MG/DL — HIGH (ref 70–99)
GLUCOSE SERPL-MCNC: 115 MG/DL — HIGH (ref 70–99)
GLUCOSE SERPL-MCNC: 120 MG/DL — HIGH (ref 70–99)
HCT VFR BLD CALC: 29.2 % — LOW (ref 39–50)
HGB BLD-MCNC: 9.6 G/DL — LOW (ref 13–17)
INR BLD: 2.04 RATIO — HIGH (ref 0.85–1.16)
MAGNESIUM SERPL-MCNC: 2.2 MG/DL — SIGNIFICANT CHANGE UP (ref 1.6–2.6)
MCHC RBC-ENTMCNC: 31.5 PG — SIGNIFICANT CHANGE UP (ref 27–34)
MCHC RBC-ENTMCNC: 32.9 GM/DL — SIGNIFICANT CHANGE UP (ref 32–36)
MCV RBC AUTO: 95.7 FL — SIGNIFICANT CHANGE UP (ref 80–100)
OSMOLALITY SERPL: 267 MOSMOL/KG — LOW (ref 275–300)
OSMOLALITY UR: 369 MOSM/KG — SIGNIFICANT CHANGE UP (ref 50–1200)
PHOSPHATE SERPL-MCNC: 3.2 MG/DL — SIGNIFICANT CHANGE UP (ref 2.5–4.5)
PLATELET # BLD AUTO: 142 K/UL — LOW (ref 150–400)
POTASSIUM SERPL-MCNC: 4.1 MMOL/L — SIGNIFICANT CHANGE UP (ref 3.5–5.3)
POTASSIUM SERPL-MCNC: 4.1 MMOL/L — SIGNIFICANT CHANGE UP (ref 3.5–5.3)
POTASSIUM SERPL-MCNC: 4.2 MMOL/L — SIGNIFICANT CHANGE UP (ref 3.5–5.3)
POTASSIUM SERPL-SCNC: 4.1 MMOL/L — SIGNIFICANT CHANGE UP (ref 3.5–5.3)
POTASSIUM SERPL-SCNC: 4.1 MMOL/L — SIGNIFICANT CHANGE UP (ref 3.5–5.3)
POTASSIUM SERPL-SCNC: 4.2 MMOL/L — SIGNIFICANT CHANGE UP (ref 3.5–5.3)
PROT SERPL-MCNC: 5.9 GM/DL — LOW (ref 6–8.3)
PROTHROM AB SERPL-ACNC: 23.3 SEC — HIGH (ref 9.9–13.4)
RBC # BLD: 3.05 M/UL — LOW (ref 4.2–5.8)
RBC # FLD: 14.7 % — HIGH (ref 10.3–14.5)
SODIUM SERPL-SCNC: 124 MMOL/L — LOW (ref 135–145)
SODIUM SERPL-SCNC: 125 MMOL/L — LOW (ref 135–145)
SODIUM SERPL-SCNC: 126 MMOL/L — LOW (ref 135–145)
SODIUM UR-SCNC: <20 MMOL/L — SIGNIFICANT CHANGE UP
WBC # BLD: 8.99 K/UL — SIGNIFICANT CHANGE UP (ref 3.8–10.5)
WBC # FLD AUTO: 8.99 K/UL — SIGNIFICANT CHANGE UP (ref 3.8–10.5)

## 2024-09-26 PROCEDURE — 99232 SBSQ HOSP IP/OBS MODERATE 35: CPT

## 2024-09-26 RX ORDER — ALBUMIN (HUMAN) 12.5 G/50ML
50 INJECTION, SOLUTION INTRAVENOUS
Refills: 0 | Status: DISCONTINUED | OUTPATIENT
Start: 2024-09-26 | End: 2024-09-28

## 2024-09-26 RX ORDER — MIDODRINE HYDROCHLORIDE 5 MG/1
5 TABLET ORAL THREE TIMES A DAY
Refills: 0 | Status: DISCONTINUED | OUTPATIENT
Start: 2024-09-26 | End: 2024-09-28

## 2024-09-26 RX ORDER — FUROSEMIDE 10 MG/ML
20 INJECTION INTRAMUSCULAR; INTRAVENOUS
Refills: 0 | Status: DISCONTINUED | OUTPATIENT
Start: 2024-09-26 | End: 2024-09-27

## 2024-09-26 RX ADMIN — FUROSEMIDE 40 MILLIGRAM(S): 10 INJECTION INTRAMUSCULAR; INTRAVENOUS at 09:25

## 2024-09-26 RX ADMIN — Medication 400 MILLIGRAM(S): at 17:08

## 2024-09-26 RX ADMIN — Medication 1 GRAM(S): at 17:07

## 2024-09-26 RX ADMIN — LACTULOSE 10 GRAM(S): 10 SOLUTION ORAL at 09:26

## 2024-09-26 RX ADMIN — Medication 400 MILLIGRAM(S): at 09:24

## 2024-09-26 RX ADMIN — FUROSEMIDE 20 MILLIGRAM(S): 10 INJECTION INTRAMUSCULAR; INTRAVENOUS at 15:05

## 2024-09-26 RX ADMIN — Medication 1 GRAM(S): at 05:55

## 2024-09-26 RX ADMIN — Medication 15 MILLIGRAM(S): at 05:55

## 2024-09-26 RX ADMIN — Medication 1 GRAM(S): at 22:23

## 2024-09-26 RX ADMIN — Medication 15 MILLIGRAM(S): at 06:25

## 2024-09-26 RX ADMIN — MIDODRINE HYDROCHLORIDE 5 MILLIGRAM(S): 5 TABLET ORAL at 12:26

## 2024-09-26 RX ADMIN — FOLIC ACID 1 MILLIGRAM(S): 1 TABLET ORAL at 09:24

## 2024-09-26 RX ADMIN — Medication 1 GRAM(S): at 14:01

## 2024-09-26 RX ADMIN — Medication 15 MILLIGRAM(S): at 22:22

## 2024-09-26 RX ADMIN — Medication 15 MILLIGRAM(S): at 22:52

## 2024-09-26 RX ADMIN — MIDODRINE HYDROCHLORIDE 5 MILLIGRAM(S): 5 TABLET ORAL at 17:08

## 2024-09-26 RX ADMIN — ALBUMIN (HUMAN) 50 MILLILITER(S): 12.5 INJECTION, SOLUTION INTRAVENOUS at 15:06

## 2024-09-27 DIAGNOSIS — E80.6 OTHER DISORDERS OF BILIRUBIN METABOLISM: ICD-10-CM

## 2024-09-27 DIAGNOSIS — D68.9 COAGULATION DEFECT, UNSPECIFIED: ICD-10-CM

## 2024-09-27 DIAGNOSIS — E87.1 HYPO-OSMOLALITY AND HYPONATREMIA: ICD-10-CM

## 2024-09-27 DIAGNOSIS — G90.9 DISORDER OF THE AUTONOMIC NERVOUS SYSTEM, UNSPECIFIED: ICD-10-CM

## 2024-09-27 DIAGNOSIS — I10 ESSENTIAL (PRIMARY) HYPERTENSION: ICD-10-CM

## 2024-09-27 DIAGNOSIS — R18.0 MALIGNANT ASCITES: ICD-10-CM

## 2024-09-27 DIAGNOSIS — C18.9 MALIGNANT NEOPLASM OF COLON, UNSPECIFIED: ICD-10-CM

## 2024-09-27 LAB
-  AMPICILLIN: SIGNIFICANT CHANGE UP
-  CIPROFLOXACIN: SIGNIFICANT CHANGE UP
-  LEVOFLOXACIN: SIGNIFICANT CHANGE UP
-  NITROFURANTOIN: SIGNIFICANT CHANGE UP
-  TETRACYCLINE: SIGNIFICANT CHANGE UP
-  VANCOMYCIN: SIGNIFICANT CHANGE UP
ALBUMIN SERPL ELPH-MCNC: 1.8 G/DL — LOW (ref 3.3–5)
ALBUMIN SERPL ELPH-MCNC: 1.8 G/DL — LOW (ref 3.3–5)
ALP SERPL-CCNC: 363 U/L — HIGH (ref 40–120)
ALT FLD-CCNC: 34 U/L — SIGNIFICANT CHANGE UP (ref 12–78)
ANION GAP SERPL CALC-SCNC: 9 MMOL/L — SIGNIFICANT CHANGE UP (ref 5–17)
AST SERPL-CCNC: 227 U/L — HIGH (ref 15–37)
BILIRUB DIRECT SERPL-MCNC: 18.2 MG/DL — HIGH (ref 0–0.3)
BILIRUB INDIRECT FLD-MCNC: 4.2 MG/DL — HIGH (ref 0.2–1)
BILIRUB SERPL-MCNC: 22.4 MG/DL — HIGH (ref 0.2–1.2)
BUN SERPL-MCNC: 34 MG/DL — HIGH (ref 7–23)
CALCIUM SERPL-MCNC: 8.2 MG/DL — LOW (ref 8.5–10.1)
CHLORIDE SERPL-SCNC: 91 MMOL/L — LOW (ref 96–108)
CO2 SERPL-SCNC: 23 MMOL/L — SIGNIFICANT CHANGE UP (ref 22–31)
CREAT SERPL-MCNC: 1.52 MG/DL — HIGH (ref 0.5–1.3)
EGFR: 55 ML/MIN/1.73M2 — LOW
EGFR: 55 ML/MIN/1.73M2 — LOW
GLUCOSE SERPL-MCNC: 84 MG/DL — SIGNIFICANT CHANGE UP (ref 70–99)
HCT VFR BLD CALC: 28.2 % — LOW (ref 39–50)
HGB BLD-MCNC: 9.4 G/DL — LOW (ref 13–17)
MAGNESIUM SERPL-MCNC: 2.3 MG/DL — SIGNIFICANT CHANGE UP (ref 1.6–2.6)
MCHC RBC-ENTMCNC: 31.8 PG — SIGNIFICANT CHANGE UP (ref 27–34)
MCHC RBC-ENTMCNC: 33.3 GM/DL — SIGNIFICANT CHANGE UP (ref 32–36)
MCV RBC AUTO: 95.3 FL — SIGNIFICANT CHANGE UP (ref 80–100)
METHOD TYPE: SIGNIFICANT CHANGE UP
PHOSPHATE SERPL-MCNC: 3.7 MG/DL — SIGNIFICANT CHANGE UP (ref 2.5–4.5)
PLATELET # BLD AUTO: 144 K/UL — LOW (ref 150–400)
POTASSIUM SERPL-MCNC: 4.4 MMOL/L — SIGNIFICANT CHANGE UP (ref 3.5–5.3)
POTASSIUM SERPL-SCNC: 4.4 MMOL/L — SIGNIFICANT CHANGE UP (ref 3.5–5.3)
PROT SERPL-MCNC: 5.8 GM/DL — LOW (ref 6–8.3)
RBC # BLD: 2.96 M/UL — LOW (ref 4.2–5.8)
RBC # FLD: 14.8 % — HIGH (ref 10.3–14.5)
SODIUM SERPL-SCNC: 123 MMOL/L — LOW (ref 135–145)
WBC # BLD: 9.34 K/UL — SIGNIFICANT CHANGE UP (ref 3.8–10.5)
WBC # FLD AUTO: 9.34 K/UL — SIGNIFICANT CHANGE UP (ref 3.8–10.5)

## 2024-09-27 PROCEDURE — 99233 SBSQ HOSP IP/OBS HIGH 50: CPT

## 2024-09-27 PROCEDURE — 76705 ECHO EXAM OF ABDOMEN: CPT | Mod: 26

## 2024-09-27 PROCEDURE — 99232 SBSQ HOSP IP/OBS MODERATE 35: CPT

## 2024-09-27 RX ADMIN — ALBUMIN (HUMAN) 50 MILLILITER(S): 12.5 INJECTION, SOLUTION INTRAVENOUS at 05:36

## 2024-09-27 RX ADMIN — Medication 5 MILLIGRAM(S): at 21:50

## 2024-09-27 RX ADMIN — Medication 1 GRAM(S): at 05:35

## 2024-09-27 RX ADMIN — Medication 1 GRAM(S): at 11:29

## 2024-09-27 RX ADMIN — Medication 15 MILLIGRAM(S): at 17:09

## 2024-09-27 RX ADMIN — Medication 1 GRAM(S): at 23:08

## 2024-09-27 RX ADMIN — ALBUMIN (HUMAN) 50 MILLILITER(S): 12.5 INJECTION, SOLUTION INTRAVENOUS at 14:40

## 2024-09-27 RX ADMIN — MIDODRINE HYDROCHLORIDE 5 MILLIGRAM(S): 5 TABLET ORAL at 11:29

## 2024-09-27 RX ADMIN — Medication 400 MILLIGRAM(S): at 17:05

## 2024-09-27 RX ADMIN — LACTULOSE 10 GRAM(S): 10 SOLUTION ORAL at 11:30

## 2024-09-27 RX ADMIN — MIDODRINE HYDROCHLORIDE 5 MILLIGRAM(S): 5 TABLET ORAL at 17:05

## 2024-09-27 RX ADMIN — MIDODRINE HYDROCHLORIDE 5 MILLIGRAM(S): 5 TABLET ORAL at 05:35

## 2024-09-27 RX ADMIN — FOLIC ACID 1 MILLIGRAM(S): 1 TABLET ORAL at 11:28

## 2024-09-27 RX ADMIN — Medication 400 MILLIGRAM(S): at 11:29

## 2024-09-27 RX ADMIN — Medication 15 MILLIGRAM(S): at 23:23

## 2024-09-27 RX ADMIN — Medication 1 GRAM(S): at 17:05

## 2024-09-27 RX ADMIN — FUROSEMIDE 20 MILLIGRAM(S): 10 INJECTION INTRAMUSCULAR; INTRAVENOUS at 14:41

## 2024-09-27 RX ADMIN — Medication 15 MILLIGRAM(S): at 18:05

## 2024-09-27 RX ADMIN — FUROSEMIDE 20 MILLIGRAM(S): 10 INJECTION INTRAMUSCULAR; INTRAVENOUS at 05:35

## 2024-09-28 ENCOUNTER — TRANSCRIPTION ENCOUNTER (OUTPATIENT)
Age: 49
End: 2024-09-28

## 2024-09-28 VITALS
OXYGEN SATURATION: 96 % | SYSTOLIC BLOOD PRESSURE: 89 MMHG | RESPIRATION RATE: 18 BRPM | TEMPERATURE: 98 F | HEART RATE: 104 BPM | DIASTOLIC BLOOD PRESSURE: 65 MMHG

## 2024-09-28 LAB
ALBUMIN SERPL ELPH-MCNC: 1.8 G/DL — LOW (ref 3.3–5)
ALP SERPL-CCNC: 399 U/L — HIGH (ref 40–120)
ALT FLD-CCNC: 34 U/L — SIGNIFICANT CHANGE UP (ref 12–78)
ANION GAP SERPL CALC-SCNC: 11 MMOL/L — SIGNIFICANT CHANGE UP (ref 5–17)
AST SERPL-CCNC: 251 U/L — HIGH (ref 15–37)
BILIRUB SERPL-MCNC: 24 MG/DL — HIGH (ref 0.2–1.2)
BUN SERPL-MCNC: 34 MG/DL — HIGH (ref 7–23)
CALCIUM SERPL-MCNC: 8.6 MG/DL — SIGNIFICANT CHANGE UP (ref 8.5–10.1)
CHLORIDE SERPL-SCNC: 93 MMOL/L — LOW (ref 96–108)
CO2 SERPL-SCNC: 22 MMOL/L — SIGNIFICANT CHANGE UP (ref 22–31)
CREAT SERPL-MCNC: 1.83 MG/DL — HIGH (ref 0.5–1.3)
CULTURE RESULTS: ABNORMAL
EGFR: 44 ML/MIN/1.73M2 — LOW
EGFR: 44 ML/MIN/1.73M2 — LOW
GLUCOSE SERPL-MCNC: 85 MG/DL — SIGNIFICANT CHANGE UP (ref 70–99)
HCT VFR BLD CALC: 28.6 % — LOW (ref 39–50)
HGB BLD-MCNC: 9.6 G/DL — LOW (ref 13–17)
MCHC RBC-ENTMCNC: 31.9 PG — SIGNIFICANT CHANGE UP (ref 27–34)
MCHC RBC-ENTMCNC: 33.6 GM/DL — SIGNIFICANT CHANGE UP (ref 32–36)
MCV RBC AUTO: 95 FL — SIGNIFICANT CHANGE UP (ref 80–100)
ORGANISM # SPEC MICROSCOPIC CNT: ABNORMAL
ORGANISM # SPEC MICROSCOPIC CNT: SIGNIFICANT CHANGE UP
PLATELET # BLD AUTO: 141 K/UL — LOW (ref 150–400)
POTASSIUM SERPL-MCNC: 4.4 MMOL/L — SIGNIFICANT CHANGE UP (ref 3.5–5.3)
POTASSIUM SERPL-SCNC: 4.4 MMOL/L — SIGNIFICANT CHANGE UP (ref 3.5–5.3)
PROT SERPL-MCNC: 5.7 GM/DL — LOW (ref 6–8.3)
RBC # BLD: 3.01 M/UL — LOW (ref 4.2–5.8)
RBC # FLD: 15.1 % — HIGH (ref 10.3–14.5)
SODIUM SERPL-SCNC: 126 MMOL/L — LOW (ref 135–145)
SPECIMEN SOURCE: SIGNIFICANT CHANGE UP
WBC # BLD: 10.16 K/UL — SIGNIFICANT CHANGE UP (ref 3.8–10.5)
WBC # FLD AUTO: 10.16 K/UL — SIGNIFICANT CHANGE UP (ref 3.8–10.5)

## 2024-09-28 PROCEDURE — 99239 HOSP IP/OBS DSCHRG MGMT >30: CPT

## 2024-09-28 RX ORDER — ALBUMIN (HUMAN) 12.5 G/50ML
50 INJECTION, SOLUTION INTRAVENOUS
Refills: 0 | Status: DISCONTINUED | OUTPATIENT
Start: 2024-09-28 | End: 2024-09-28

## 2024-09-28 RX ORDER — FUROSEMIDE 10 MG/ML
20 INJECTION INTRAMUSCULAR; INTRAVENOUS
Refills: 0 | Status: DISCONTINUED | OUTPATIENT
Start: 2024-09-28 | End: 2024-09-28

## 2024-09-28 RX ORDER — MIDODRINE HYDROCHLORIDE 5 MG/1
1 TABLET ORAL
Qty: 45 | Refills: 0
Start: 2024-09-28 | End: 2024-10-12

## 2024-09-28 RX ADMIN — ALBUMIN (HUMAN) 50 MILLILITER(S): 12.5 INJECTION, SOLUTION INTRAVENOUS at 14:34

## 2024-09-28 RX ADMIN — FOLIC ACID 1 MILLIGRAM(S): 1 TABLET ORAL at 09:28

## 2024-09-28 RX ADMIN — Medication 1 GRAM(S): at 17:32

## 2024-09-28 RX ADMIN — MIDODRINE HYDROCHLORIDE 5 MILLIGRAM(S): 5 TABLET ORAL at 17:32

## 2024-09-28 RX ADMIN — MIDODRINE HYDROCHLORIDE 5 MILLIGRAM(S): 5 TABLET ORAL at 12:13

## 2024-09-28 RX ADMIN — LACTULOSE 10 GRAM(S): 10 SOLUTION ORAL at 09:28

## 2024-09-28 RX ADMIN — Medication 400 MILLIGRAM(S): at 17:32

## 2024-09-28 RX ADMIN — MIDODRINE HYDROCHLORIDE 5 MILLIGRAM(S): 5 TABLET ORAL at 06:05

## 2024-09-28 RX ADMIN — Medication 1 GRAM(S): at 12:13

## 2024-09-28 RX ADMIN — Medication 1 GRAM(S): at 06:05

## 2024-09-28 RX ADMIN — Medication 400 MILLIGRAM(S): at 09:28

## 2024-09-30 LAB
CULTURE RESULTS: SIGNIFICANT CHANGE UP
SPECIMEN SOURCE: SIGNIFICANT CHANGE UP

## 2024-10-02 ENCOUNTER — EMERGENCY (EMERGENCY)
Facility: HOSPITAL | Age: 49
LOS: 0 days | Discharge: HOME | End: 2024-10-02
Attending: EMERGENCY MEDICINE
Payer: COMMERCIAL

## 2024-10-02 VITALS
DIASTOLIC BLOOD PRESSURE: 57 MMHG | SYSTOLIC BLOOD PRESSURE: 84 MMHG | HEART RATE: 97 BPM | RESPIRATION RATE: 17 BRPM | OXYGEN SATURATION: 95 %

## 2024-10-02 VITALS
HEART RATE: 101 BPM | HEIGHT: 73 IN | RESPIRATION RATE: 21 BRPM | TEMPERATURE: 98 F | DIASTOLIC BLOOD PRESSURE: 57 MMHG | OXYGEN SATURATION: 96 % | SYSTOLIC BLOOD PRESSURE: 68 MMHG

## 2024-10-02 DIAGNOSIS — C18.9 MALIGNANT NEOPLASM OF COLON, UNSPECIFIED: ICD-10-CM

## 2024-10-02 DIAGNOSIS — Z98.890 OTHER SPECIFIED POSTPROCEDURAL STATES: Chronic | ICD-10-CM

## 2024-10-02 DIAGNOSIS — C78.7 SECONDARY MALIGNANT NEOPLASM OF LIVER AND INTRAHEPATIC BILE DUCT: ICD-10-CM

## 2024-10-02 DIAGNOSIS — K56.699 OTHER INTESTINAL OBSTRUCTION UNSPECIFIED AS TO PARTIAL VERSUS COMPLETE OBSTRUCTION: ICD-10-CM

## 2024-10-02 DIAGNOSIS — I95.9 HYPOTENSION, UNSPECIFIED: ICD-10-CM

## 2024-10-02 DIAGNOSIS — R18.8 OTHER ASCITES: ICD-10-CM

## 2024-10-02 DIAGNOSIS — E86.0 DEHYDRATION: ICD-10-CM

## 2024-10-02 DIAGNOSIS — R17 UNSPECIFIED JAUNDICE: ICD-10-CM

## 2024-10-02 DIAGNOSIS — C78.00 SECONDARY MALIGNANT NEOPLASM OF UNSPECIFIED LUNG: ICD-10-CM

## 2024-10-02 DIAGNOSIS — G93.41 METABOLIC ENCEPHALOPATHY: ICD-10-CM

## 2024-10-02 DIAGNOSIS — K83.09 OTHER CHOLANGITIS: ICD-10-CM

## 2024-10-02 DIAGNOSIS — N17.9 ACUTE KIDNEY FAILURE, UNSPECIFIED: ICD-10-CM

## 2024-10-02 DIAGNOSIS — K83.8 OTHER SPECIFIED DISEASES OF BILIARY TRACT: ICD-10-CM

## 2024-10-02 DIAGNOSIS — Z90.49 ACQUIRED ABSENCE OF OTHER SPECIFIED PARTS OF DIGESTIVE TRACT: Chronic | ICD-10-CM

## 2024-10-02 DIAGNOSIS — I10 ESSENTIAL (PRIMARY) HYPERTENSION: ICD-10-CM

## 2024-10-02 LAB
ALBUMIN SERPL ELPH-MCNC: 1.7 G/DL — LOW (ref 3.3–5)
ALP SERPL-CCNC: 478 U/L — HIGH (ref 40–120)
ALT FLD-CCNC: 36 U/L — SIGNIFICANT CHANGE UP (ref 12–78)
ANION GAP SERPL CALC-SCNC: 13 MMOL/L — SIGNIFICANT CHANGE UP (ref 5–17)
APTT BLD: 47.4 SEC — HIGH (ref 24.5–35.6)
AST SERPL-CCNC: 258 U/L — HIGH (ref 15–37)
BASE EXCESS BLDV CALC-SCNC: -6.8 MMOL/L — LOW (ref -2–3)
BASOPHILS # BLD AUTO: 0.07 K/UL — SIGNIFICANT CHANGE UP (ref 0–0.2)
BASOPHILS NFR BLD AUTO: 0.4 % — SIGNIFICANT CHANGE UP (ref 0–2)
BILIRUB SERPL-MCNC: 24 MG/DL — HIGH (ref 0.2–1.2)
BUN SERPL-MCNC: 69 MG/DL — HIGH (ref 7–23)
CALCIUM SERPL-MCNC: 8.4 MG/DL — LOW (ref 8.5–10.1)
CHLORIDE SERPL-SCNC: 89 MMOL/L — LOW (ref 96–108)
CO2 SERPL-SCNC: 20 MMOL/L — LOW (ref 22–31)
CREAT SERPL-MCNC: 4.72 MG/DL — HIGH (ref 0.5–1.3)
EGFR: 14 ML/MIN/1.73M2 — LOW
EOSINOPHIL # BLD AUTO: 0.02 K/UL — SIGNIFICANT CHANGE UP (ref 0–0.5)
EOSINOPHIL NFR BLD AUTO: 0.1 % — SIGNIFICANT CHANGE UP (ref 0–6)
FLUAV AG NPH QL: SIGNIFICANT CHANGE UP
FLUBV AG NPH QL: SIGNIFICANT CHANGE UP
GLUCOSE SERPL-MCNC: 100 MG/DL — HIGH (ref 70–99)
HCO3 BLDV-SCNC: 20 MMOL/L — LOW (ref 22–29)
HCT VFR BLD CALC: 30.9 % — LOW (ref 39–50)
HGB BLD-MCNC: 10.2 G/DL — LOW (ref 13–17)
IMM GRANULOCYTES NFR BLD AUTO: 1 % — HIGH (ref 0–0.9)
INR BLD: 2.97 RATIO — HIGH (ref 0.85–1.16)
LACTATE SERPL-SCNC: 2.7 MMOL/L — HIGH (ref 0.7–2)
LACTATE SERPL-SCNC: 3.1 MMOL/L — HIGH (ref 0.7–2)
LYMPHOCYTES # BLD AUTO: 0.86 K/UL — LOW (ref 1–3.3)
LYMPHOCYTES # BLD AUTO: 5.3 % — LOW (ref 13–44)
MCHC RBC-ENTMCNC: 31.2 PG — SIGNIFICANT CHANGE UP (ref 27–34)
MCHC RBC-ENTMCNC: 33 GM/DL — SIGNIFICANT CHANGE UP (ref 32–36)
MCV RBC AUTO: 94.5 FL — SIGNIFICANT CHANGE UP (ref 80–100)
MONOCYTES # BLD AUTO: 0.78 K/UL — SIGNIFICANT CHANGE UP (ref 0–0.9)
MONOCYTES NFR BLD AUTO: 4.8 % — SIGNIFICANT CHANGE UP (ref 2–14)
NEUTROPHILS # BLD AUTO: 14.29 K/UL — HIGH (ref 1.8–7.4)
NEUTROPHILS NFR BLD AUTO: 88.4 % — HIGH (ref 43–77)
PCO2 BLDV: 42 MMHG — SIGNIFICANT CHANGE UP (ref 42–55)
PH BLDV: 7.28 — LOW (ref 7.32–7.43)
PLATELET # BLD AUTO: 157 K/UL — SIGNIFICANT CHANGE UP (ref 150–400)
PO2 BLDV: 53 MMHG — HIGH (ref 25–45)
POTASSIUM SERPL-MCNC: 4.8 MMOL/L — SIGNIFICANT CHANGE UP (ref 3.5–5.3)
POTASSIUM SERPL-SCNC: 4.8 MMOL/L — SIGNIFICANT CHANGE UP (ref 3.5–5.3)
PROT SERPL-MCNC: 6.2 GM/DL — SIGNIFICANT CHANGE UP (ref 6–8.3)
PROTHROM AB SERPL-ACNC: 33.8 SEC — HIGH (ref 9.9–13.4)
RBC # BLD: 3.27 M/UL — LOW (ref 4.2–5.8)
RBC # FLD: 15.3 % — HIGH (ref 10.3–14.5)
RSV RNA NPH QL NAA+NON-PROBE: SIGNIFICANT CHANGE UP
SAO2 % BLDV: 79 % — SIGNIFICANT CHANGE UP (ref 67–88)
SARS-COV-2 RNA SPEC QL NAA+PROBE: SIGNIFICANT CHANGE UP
SODIUM SERPL-SCNC: 122 MMOL/L — LOW (ref 135–145)
WBC # BLD: 16.18 K/UL — HIGH (ref 3.8–10.5)
WBC # FLD AUTO: 16.18 K/UL — HIGH (ref 3.8–10.5)

## 2024-10-02 PROCEDURE — 96375 TX/PRO/DX INJ NEW DRUG ADDON: CPT

## 2024-10-02 PROCEDURE — 0241U: CPT

## 2024-10-02 PROCEDURE — 99285 EMERGENCY DEPT VISIT HI MDM: CPT | Mod: 25

## 2024-10-02 PROCEDURE — 80053 COMPREHEN METABOLIC PANEL: CPT

## 2024-10-02 PROCEDURE — 71045 X-RAY EXAM CHEST 1 VIEW: CPT | Mod: 26

## 2024-10-02 PROCEDURE — 85610 PROTHROMBIN TIME: CPT

## 2024-10-02 PROCEDURE — 93005 ELECTROCARDIOGRAM TRACING: CPT

## 2024-10-02 PROCEDURE — 99285 EMERGENCY DEPT VISIT HI MDM: CPT

## 2024-10-02 PROCEDURE — 87040 BLOOD CULTURE FOR BACTERIA: CPT

## 2024-10-02 PROCEDURE — 99497 ADVNCD CARE PLAN 30 MIN: CPT | Mod: 25

## 2024-10-02 PROCEDURE — 36415 COLL VENOUS BLD VENIPUNCTURE: CPT

## 2024-10-02 PROCEDURE — 71045 X-RAY EXAM CHEST 1 VIEW: CPT

## 2024-10-02 PROCEDURE — 82803 BLOOD GASES ANY COMBINATION: CPT

## 2024-10-02 PROCEDURE — 93010 ELECTROCARDIOGRAM REPORT: CPT

## 2024-10-02 PROCEDURE — 96374 THER/PROPH/DIAG INJ IV PUSH: CPT

## 2024-10-02 PROCEDURE — 85730 THROMBOPLASTIN TIME PARTIAL: CPT

## 2024-10-02 PROCEDURE — 85025 COMPLETE CBC W/AUTO DIFF WBC: CPT

## 2024-10-02 PROCEDURE — 83605 ASSAY OF LACTIC ACID: CPT

## 2024-10-02 RX ORDER — LORAZEPAM 4 MG/ML
0.5 INJECTION INTRAMUSCULAR; INTRAVENOUS EVERY 4 HOURS
Refills: 0 | Status: DISCONTINUED | OUTPATIENT
Start: 2024-10-02 | End: 2024-10-02

## 2024-10-02 RX ORDER — PIPERACILLIN SODIUM AND TAZOBACTAM SODIUM 3; .375 G/15ML; G/15ML
3.38 INJECTION, POWDER, FOR SOLUTION INTRAVENOUS ONCE
Refills: 0 | Status: COMPLETED | OUTPATIENT
Start: 2024-10-02 | End: 2024-10-02

## 2024-10-02 RX ORDER — HYDROMORPHONE HYDROCHLORIDE 2 MG/1
0.5 TABLET ORAL
Refills: 0 | Status: DISCONTINUED | OUTPATIENT
Start: 2024-10-02 | End: 2024-10-02

## 2024-10-02 RX ORDER — SODIUM CHLORIDE 9 MG/ML
2500 INJECTION INTRAMUSCULAR; INTRAVENOUS; SUBCUTANEOUS ONCE
Refills: 0 | Status: COMPLETED | OUTPATIENT
Start: 2024-10-02 | End: 2024-10-02

## 2024-10-02 RX ORDER — HYDROMORPHONE HYDROCHLORIDE 2 MG/1
1 TABLET ORAL
Refills: 0 | Status: DISCONTINUED | OUTPATIENT
Start: 2024-10-02 | End: 2024-10-02

## 2024-10-02 RX ORDER — GLYCOPYRROLATE 0.2 MG/ML
0.2 INJECTION INTRAMUSCULAR; INTRAVENOUS EVERY 6 HOURS
Refills: 0 | Status: DISCONTINUED | OUTPATIENT
Start: 2024-10-02 | End: 2024-10-02

## 2024-10-02 RX ADMIN — SODIUM CHLORIDE 2500 MILLILITER(S): 9 INJECTION INTRAMUSCULAR; INTRAVENOUS; SUBCUTANEOUS at 13:38

## 2024-10-02 RX ADMIN — HYDROMORPHONE HYDROCHLORIDE 0.5 MILLIGRAM(S): 2 TABLET ORAL at 20:43

## 2024-10-02 RX ADMIN — PIPERACILLIN SODIUM AND TAZOBACTAM SODIUM 200 GRAM(S): 3; .375 INJECTION, POWDER, FOR SOLUTION INTRAVENOUS at 15:09

## 2024-10-02 NOTE — CONSULT NOTE ADULT - SUBJECTIVE AND OBJECTIVE BOX
HPI: Pt is a 49-year-old Irish male, who was initially diagnosed with likely early stage malignant neoplasm of colon in approximately December 2019, which was managed by neoadjuvant course of systemic therapy, followed by surgical resection of the primary tumor, followed by adjuvant treatment.  Beginning in 2022, the patient was noted to have increase in the CEA level, which eventually led to diagnosis of widely metastatic disease, currently with metastases to the liver and lung.  The patient has been  treated with multiple regimens of systemic therapy, currently with single agent panitumumab at [facility] under the care of Dr. Blood. Patient has had multiple readmission to  over the past several months for sepsis in setting of bacteremia, uti, cholangitis 2/2 malignant bowel obstruction, large biloma  s/p IR drain placement. Most recently admitted last week due to concern for hyponatremia and other metabolic derangements. Today, patient presents to the ED after visiting nurse vitals revealed hypotension. Palliative medicine consulted for further assistance with GOC.     Examined pt at bedside, accompanied by his wife, Patricia Flores. Patient with generalized jaundice, acute encephalopathy, moderate distress, unable to participate in interview. Wife states that patient went home for the past week and has had increasing weakness and debilitation. He requires additional help for ADLs and has not been able to move much, mostly bed bound. She feels as though he has been through enough and does not want him to suffer further.       PAIN: unable to obtain     DYSPNEA: unable to obtain     PAST MEDICAL & SURGICAL HISTORY:  Colon cancer      Hypertension      Metastasis to liver      Metastasis to lung      Obstructive jaundice      H/O sepsis      H/O bacteremia      History of cholangitis      History of biliary stent insertion      S/P hernia surgery      History of ablation of neoplasm of liver      S/P colon resection          SOCIAL HX:    Hx opiate tolerance ( x)YES  ( )NO    Baseline ADLs  (Prior to Admission)  ( ) Independent   (x )Dependent    FAMILY HISTORY:  FH: heart attack (Father)        Review of Systems:  Unable to obtain/Limited due to pt's current condition       PHYSICAL EXAM:    Vital Signs Last 24 Hrs  T(C): 36.4 (02 Oct 2024 15:35), Max: 36.6 (02 Oct 2024 12:55)  T(F): 97.6 (02 Oct 2024 15:35), Max: 97.8 (02 Oct 2024 12:55)  HR: 91 (02 Oct 2024 15:35) (91 - 101)  BP: 92/59 (02 Oct 2024 15:35) (68/57 - 92/59)  BP(mean): 70 (02 Oct 2024 15:35) (70 - 70)  RR: 17 (02 Oct 2024 15:35) (17 - 21)  SpO2: 97% (02 Oct 2024 15:35) (96% - 97%)    Parameters below as of 02 Oct 2024 15:35  Patient On (Oxygen Delivery Method): nasal cannula  O2 Flow (L/min): 2    Daily Height in cm: 185.42 (02 Oct 2024 12:55)    Daily     PPSV2: 30   %    General: generalized jaundice, moderate distress, encephalopathic   Lungs: diminished breath sounds   Cardiac: hypotensive   GI: abdominal distention, no tenderness to palpation  Ext: well perfused       LABS:                        10.2   16.18 )-----------( 157      ( 02 Oct 2024 12:56 )             30.9     10-02    122[L]  |  89[L]  |  69[H]  ----------------------------<  100[H]  4.8   |  20[L]  |  4.72[H]    Ca    8.4[L]      02 Oct 2024 12:56    TPro  6.2  /  Alb  1.7[L]  /  TBili  24.0[H]  /  DBili  x   /  AST  258[H]  /  ALT  36  /  AlkPhos  478[H]  10-02    PT/INR - ( 02 Oct 2024 12:56 )   PT: 33.8 sec;   INR: 2.97 ratio         PTT - ( 02 Oct 2024 12:56 )  PTT:47.4 sec  Albumin: Albumin: 1.7 g/dL (10-02 @ 12:56)      Allergies    No Known Allergies    Intolerances      MEDICATIONS  (STANDING):    MEDICATIONS  (PRN):  glycopyrrolate Injectable 0.2 milliGRAM(s) IV Push every 6 hours PRN Secretions  HYDROmorphone  Injectable 1 milliGRAM(s) IV Push every 3 hours PRN Severe Pain (7 - 10)  HYDROmorphone  Injectable 0.5 milliGRAM(s) IV Push every 3 hours PRN Moderate Pain (4 - 6)  LORazepam   Injectable 0.5 milliGRAM(s) IV Push every 4 hours PRN Anxiety      RADIOLOGY/ADDITIONAL STUDIES:

## 2024-10-02 NOTE — PHARMACOTHERAPY INTERVENTION NOTE - COMMENTS
Medication reconciliation completed.  Reviewed Medication list and confirmed med allergies with patient's wife Patricia at bedside; confirmed with Dr. First Meddarell.

## 2024-10-02 NOTE — ED ADULT NURSE NOTE - NSFALLHARMRISKINTERV_ED_ALL_ED
Assistance OOB with selected safe patient handling equipment if applicable/Assistance with ambulation/Communicate risk of Fall with Harm to all staff, patient, and family/Monitor gait and stability/Provide visual cue: red socks, yellow wristband, yellow gown, etc/Reinforce activity limits and safety measures with patient and family/Bed in lowest position, wheels locked, appropriate side rails in place/Call bell, personal items and telephone in reach/Instruct patient to call for assistance before getting out of bed/chair/stretcher/Non-slip footwear applied when patient is off stretcher/[place] to call system/Physically safe environment - no spills, clutter or unnecessary equipment/Purposeful Proactive Rounding/Room/bathroom lighting operational, light cord in reach
None known

## 2024-10-02 NOTE — ED ADULT TRIAGE NOTE - CHIEF COMPLAINT QUOTE
hypotensive  and abd distention. 4.5L drained last thursday. hx colon ca. Pt awake alert and orineted x4. Pt jaundice.

## 2024-10-02 NOTE — ED PROVIDER NOTE - PROGRESS NOTE DETAILS
Pt's BP currently at baseline.  Pt and family seen by Palliative care team and they are electing to go to hospice.  Placement pending.  Malik Carrasco, DO

## 2024-10-02 NOTE — CHART NOTE - NSCHARTNOTEFT_GEN_A_CORE
Pt is a 48 y/o male with a PMHx of ascites s/p paracentesis on 9/24 with 4450CC of clear yellow ascites removed, bacteremia, colon cancer with mets to liver to lung, cholangitis, HTN, obstructive jaundice, and sepsis. Pt presented to  ED via ambulance from home for hypotension. Pt was recently admitted to  from 9/24 to 9/28 for hyponatremia, malignant ascites and hyperbilirubinemia.   Pt's spouse, Patricia, now making pt DNR/DNI, requesting inpatient hospice at [facility]. SW spoke with Erika Hyde @[facility], who reported no bed availability for today. SW spoke with Jes @[facility], who stated she would expedite referral while checking on beds for admission today.  Pt accepted to [facility] this afternoon, 6:30pm BLS transport arranged. SW informed pt's spouse and family of discharge plan. Emotional support and SW contact information provided during this difficult time. Case discussed with MD and RN.

## 2024-10-02 NOTE — CONSULT NOTE ADULT - CONVERSATION DETAILS
Met with pt and wife at bedside, pt is encephalopathic, acute distress, unable to participate in conversation. Patients' wife shares concerns about patients overall decline and worsening medical condition. We discussed pt's underlying metastatic colon cancer and patient's multiple different treatment plans in the past. Unfortunately we are now in a place where patient has had progressive disease and decline and is no longer able to tolerate any further interventions. Wife shares that she does not want patient to suffer any further and does not want any aggressive interventions or treatment modalities. She wishes to focus on patient's comfort and manage his pain and symptoms.     Hospice was explained as well  as an end of life care philosophy.  When a disease cannot be cured, or family/patient decide the treatment burdens out weigh the risk and one choses to change focus of treatment from cure to quality/comfort.    Patient at this time due to encephalopathy and moderate distress, would not be able to tolerate any PO meds. Discussed need for IV medications for symptomatic control of pain, dyspnea, n/v, etc. Discussed inpatient hospice referral, patient lives in [place]. Discussed with ER , plan for referral to inpatient hospice care.     Additionally, we discussed at length patients underlying clinical diagnosis at length.  We discussed resuscitation and risk and benefits of CPR. Risks include, broken ribs, lung puncture, pain and discomfort.   At this time due to patients underlying irreversible diagnosis of metastatic colon cancer, I would not recommend CPR because it would not reverse current medical condition.  They understand that DNR means NO CPR and that would allow natural death.  No CPR means no attempt to restart the heart once it has stopped.     We also discussed mechanical ventilation in  an event that they could no longer breath on their own.  Risk and benefits of mechanical ventilation were discussed at length.  At this time, due to patients irreversible medical condition of metastatic colon cancer it is of concern that if patient were to be intubated, extubation may not be possible.  Also intubation would not reverse current medical condition which if progresses its natural course would lead to the patients death.      At this time, pt's wife wishes to change code status to DNR/DNI with trial of NIV and comfort measures only. MOLST form completed.       Additional emotional support and counseling provided. Our team will continue to follow.

## 2024-10-02 NOTE — ED PROVIDER NOTE - CARE PLAN
1 Principal Discharge DX:	Hypotension  Secondary Diagnosis:	Dehydration  Secondary Diagnosis:	SEVEN (acute kidney injury)

## 2024-10-02 NOTE — CONSULT NOTE ADULT - ASSESSMENT
Pt is a 49-year-old [place] male, who was initially diagnosed with likely early stage malignant neoplasm of colon in approximately December 2019, which was managed by neoadjuvant course of systemic therapy, followed by surgical resection of the primary tumor, followed by adjuvant treatment.  Beginning in 2022, the patient was noted to have increase in the CEA level, which eventually led to diagnosis of widely metastatic disease, currently with metastases to the liver and lung.  The patient has been  treated with multiple regimens of systemic therapy, currently with single agent panitumumab at [facility] under the care of Dr. Blood. Patient has had multiple readmission to  over the past several months for sepsis in setting of bacteremia, uti, cholangitis 2/2 malignant bowel obstruction, large biloma  s/p IR drain placement. Most recently admitted last week due to concern for hyponatremia and other metabolic derangements. Today, patient presents to the ED after visiting nurse vitals revealed hypotension. Palliative medicine consulted for further assistance with GOC.       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  large biloma s/p IR drain   Hypotension  Acute metabolic encephalopathy    GOC/ACP  Capacity: pt does not have capacity for medical decision making   HCP/Surrogate: pt's wife Patricia Flores   Code Status: DNR/DNI with trial NIV/CMO   MOLST: completed 10/2/24   Dispo Plan: inpatient hospice referral     Psychological and Psychiatric Aspects of Care:   --Greif/Bereavment: emotional support provided  --Hx of psychiatric dx: none  -Pastoral Care Available PRN     Social Aspects of Care  -SW involved     Cultural Aspects  -Primary Language: English    Additional Comfort Recommendations   Ativan 0.25 mg IV q 4hr PRN for anxiety  Haldol 0.25mg IV q 6hours prn for AGitation  Dilaudid 0.5-1mg IV q3hr PRN pain, dyspnea, RR >22  Glycopyralate 0.2 mg IV q 6 hrs PRN for congestions  Artificial tears Q6 hrs  Biotene mouth spray Q6 hrs  Tylenol Q 6 hrs prn for fever  Dulcolax q 72 hrs PRN if no bowel movements      Ethical and Legal Aspects:   NA          Discussed With: Case coordinated with attending and SW and RN     Time Spent: 90 minutes including the care, coordination and counseling of this patient, 50% of which was spent coordinating and counseling.

## 2024-10-02 NOTE — ED ADULT NURSE NOTE - OBJECTIVE STATEMENT
pt bibems c/o hypotension and abd distention. pt had 4.5L drained last thursday from abdomen. pmh colon ca. Pt is jaundice.

## 2024-10-02 NOTE — ED PROVIDER NOTE - OBJECTIVE STATEMENT
48 y/o male with a PMHx of bacteremia, colon cancer with mets to liver to lung, cholangitis, HTN, obstructive jaundice, sepsis presents to the ED BIBA from home for hypotension. Pt's visiting nurse came and checked his BP. Pt was hypotensive to 70s systolic and was brought to the ED for evaluation. Pt received 3 doses of Midodrine today. Pt was admitted to  from 9/24 to 9/28 for hyponatremia, malignant ascites and hyperbilirubinemia. Pt full code. 50 y/o male with a PMHx of ascites s/p paracentesis on 9/24 with 4450CC of clear yellow ascites removed, bacteremia, colon cancer with mets to liver to lung, cholangitis, HTN, obstructive jaundice, sepsis presents to the ED BIBA from home for hypotension. Pt's visiting nurse came and checked his BP. Pt was hypotensive to 70s systolic and was brought to the ED for evaluation. Pt received 3 doses of Midodrine today. Pt was admitted to  from 9/24 to 9/28 for hyponatremia, malignant ascites and hyperbilirubinemia. Pt full code.

## 2024-10-07 DIAGNOSIS — E87.1 HYPO-OSMOLALITY AND HYPONATREMIA: ICD-10-CM

## 2024-10-07 DIAGNOSIS — C78.7 SECONDARY MALIGNANT NEOPLASM OF LIVER AND INTRAHEPATIC BILE DUCT: ICD-10-CM

## 2024-10-07 DIAGNOSIS — I10 ESSENTIAL (PRIMARY) HYPERTENSION: ICD-10-CM

## 2024-10-07 DIAGNOSIS — R18.0 MALIGNANT ASCITES: ICD-10-CM

## 2024-10-07 DIAGNOSIS — C78.00 SECONDARY MALIGNANT NEOPLASM OF UNSPECIFIED LUNG: ICD-10-CM

## 2024-10-07 DIAGNOSIS — G90.9 DISORDER OF THE AUTONOMIC NERVOUS SYSTEM, UNSPECIFIED: ICD-10-CM

## 2024-10-07 DIAGNOSIS — D68.9 COAGULATION DEFECT, UNSPECIFIED: ICD-10-CM

## 2024-10-07 LAB
CULTURE RESULTS: SIGNIFICANT CHANGE UP
CULTURE RESULTS: SIGNIFICANT CHANGE UP
SPECIMEN SOURCE: SIGNIFICANT CHANGE UP
SPECIMEN SOURCE: SIGNIFICANT CHANGE UP

## 2024-10-23 LAB
CULTURE RESULTS: SIGNIFICANT CHANGE UP
SPECIMEN SOURCE: SIGNIFICANT CHANGE UP